# Patient Record
Sex: MALE | Race: WHITE | Employment: OTHER | URBAN - METROPOLITAN AREA
[De-identification: names, ages, dates, MRNs, and addresses within clinical notes are randomized per-mention and may not be internally consistent; named-entity substitution may affect disease eponyms.]

---

## 2018-05-02 DIAGNOSIS — E78.49 OTHER HYPERLIPIDEMIA: ICD-10-CM

## 2018-05-02 DIAGNOSIS — I10 ESSENTIAL HYPERTENSION, BENIGN: ICD-10-CM

## 2018-05-02 DIAGNOSIS — R53.83 FATIGUE, UNSPECIFIED TYPE: Primary | ICD-10-CM

## 2018-05-02 DIAGNOSIS — E88.9 NUTRITIONAL AND METABOLIC CARDIOMYOPATHY (HCC): ICD-10-CM

## 2018-05-02 DIAGNOSIS — M79.7 FIBROSITIS: ICD-10-CM

## 2018-05-02 DIAGNOSIS — E55.9 VITAMIN D DEFICIENCY: ICD-10-CM

## 2018-05-02 DIAGNOSIS — Z79.899 HIGH RISK MEDICATION USE: ICD-10-CM

## 2018-05-02 DIAGNOSIS — I43 NUTRITIONAL AND METABOLIC CARDIOMYOPATHY (HCC): ICD-10-CM

## 2018-05-02 DIAGNOSIS — E63.9 NUTRITIONAL AND METABOLIC CARDIOMYOPATHY (HCC): ICD-10-CM

## 2018-06-13 ENCOUNTER — HOSPITAL ENCOUNTER (OUTPATIENT)
Age: 53
Discharge: HOME | End: 2018-06-15
Payer: COMMERCIAL

## 2018-06-13 DIAGNOSIS — I43 NUTRITIONAL AND METABOLIC CARDIOMYOPATHY (HCC): ICD-10-CM

## 2018-06-13 DIAGNOSIS — E55.9 VITAMIN D DEFICIENCY: ICD-10-CM

## 2018-06-13 DIAGNOSIS — R53.83 FATIGUE, UNSPECIFIED TYPE: ICD-10-CM

## 2018-06-13 DIAGNOSIS — I10 ESSENTIAL HYPERTENSION, BENIGN: ICD-10-CM

## 2018-06-13 DIAGNOSIS — Z79.899 HIGH RISK MEDICATION USE: ICD-10-CM

## 2018-06-13 DIAGNOSIS — E63.9 NUTRITIONAL AND METABOLIC CARDIOMYOPATHY (HCC): ICD-10-CM

## 2018-06-13 DIAGNOSIS — E78.49 OTHER HYPERLIPIDEMIA: ICD-10-CM

## 2018-06-13 DIAGNOSIS — E88.9 NUTRITIONAL AND METABOLIC CARDIOMYOPATHY (HCC): ICD-10-CM

## 2018-06-13 DIAGNOSIS — M79.7 FIBROSITIS: ICD-10-CM

## 2018-06-13 LAB
ALBUMIN SERPL-MCNC: 4.1 G/DL (ref 3.5–5.2)
ALP BLD-CCNC: 60 U/L (ref 40–129)
ALT SERPL-CCNC: 21 U/L (ref 0–40)
ANION GAP SERPL CALCULATED.3IONS-SCNC: 13 MMOL/L (ref 7–16)
AST SERPL-CCNC: 20 U/L (ref 0–39)
BILIRUB SERPL-MCNC: 0.6 MG/DL (ref 0–1.2)
BILIRUBIN URINE: NEGATIVE
BLOOD, URINE: NEGATIVE
BUN BLDV-MCNC: 23 MG/DL (ref 6–20)
CALCIUM SERPL-MCNC: 9.1 MG/DL (ref 8.6–10.2)
CHLORIDE BLD-SCNC: 101 MMOL/L (ref 98–107)
CHOLESTEROL, TOTAL: 155 MG/DL (ref 0–199)
CLARITY: CLEAR
CO2: 28 MMOL/L (ref 22–29)
COLOR: YELLOW
CREAT SERPL-MCNC: 1.2 MG/DL (ref 0.7–1.2)
GFR AFRICAN AMERICAN: >60
GFR NON-AFRICAN AMERICAN: >60 ML/MIN/1.73
GLUCOSE BLD-MCNC: 113 MG/DL (ref 74–109)
GLUCOSE URINE: NEGATIVE MG/DL
HBA1C MFR BLD: 6.3 % (ref 4.8–5.9)
HCT VFR BLD CALC: 46.9 % (ref 37–54)
HDLC SERPL-MCNC: 30 MG/DL
HEMOGLOBIN: 14.9 G/DL (ref 12.5–16.5)
KETONES, URINE: NEGATIVE MG/DL
LDL CHOLESTEROL CALCULATED: 102 MG/DL (ref 0–99)
LEUKOCYTE ESTERASE, URINE: NEGATIVE
MCH RBC QN AUTO: 30.2 PG (ref 26–35)
MCHC RBC AUTO-ENTMCNC: 31.8 % (ref 32–34.5)
MCV RBC AUTO: 95.1 FL (ref 80–99.9)
NITRITE, URINE: NEGATIVE
PDW BLD-RTO: 13.8 FL (ref 11.5–15)
PH UA: 5.5 (ref 5–9)
PLATELET # BLD: 231 E9/L (ref 130–450)
PMV BLD AUTO: 9.4 FL (ref 7–12)
POTASSIUM SERPL-SCNC: 4 MMOL/L (ref 3.5–5)
PROTEIN UA: NEGATIVE MG/DL
RBC # BLD: 4.93 E12/L (ref 3.8–5.8)
SODIUM BLD-SCNC: 142 MMOL/L (ref 132–146)
SPECIFIC GRAVITY UA: >=1.03 (ref 1–1.03)
TOTAL PROTEIN: 7.1 G/DL (ref 6.4–8.3)
TRIGL SERPL-MCNC: 117 MG/DL (ref 0–149)
TSH SERPL DL<=0.05 MIU/L-ACNC: 1.84 UIU/ML (ref 0.27–4.2)
URIC ACID, SERUM: 8.6 MG/DL (ref 3.4–7)
UROBILINOGEN, URINE: 0.2 E.U./DL
VITAMIN D 25-HYDROXY: 27 NG/ML (ref 30–100)
VLDLC SERPL CALC-MCNC: 23 MG/DL
WBC # BLD: 5.7 E9/L (ref 4.5–11.5)

## 2018-06-13 PROCEDURE — 83036 HEMOGLOBIN GLYCOSYLATED A1C: CPT

## 2018-06-13 PROCEDURE — 80061 LIPID PANEL: CPT

## 2018-06-13 PROCEDURE — 82306 VITAMIN D 25 HYDROXY: CPT

## 2018-06-13 PROCEDURE — 81003 URINALYSIS AUTO W/O SCOPE: CPT

## 2018-06-13 PROCEDURE — 80053 COMPREHEN METABOLIC PANEL: CPT

## 2018-06-13 PROCEDURE — 84550 ASSAY OF BLOOD/URIC ACID: CPT

## 2018-06-13 PROCEDURE — 85027 COMPLETE CBC AUTOMATED: CPT

## 2018-06-13 PROCEDURE — 84443 ASSAY THYROID STIM HORMONE: CPT

## 2018-06-14 DIAGNOSIS — F41.9 ANXIETY: ICD-10-CM

## 2018-06-14 DIAGNOSIS — M79.7 FIBROMYALGIA: ICD-10-CM

## 2018-06-14 DIAGNOSIS — E88.81 METABOLIC SYNDROME: ICD-10-CM

## 2018-06-20 ENCOUNTER — OFFICE VISIT (OUTPATIENT)
Dept: PRIMARY CARE CLINIC | Age: 53
End: 2018-06-20
Payer: COMMERCIAL

## 2018-06-20 VITALS
DIASTOLIC BLOOD PRESSURE: 72 MMHG | TEMPERATURE: 98.3 F | OXYGEN SATURATION: 96 % | WEIGHT: 266 LBS | HEART RATE: 93 BPM | BODY MASS INDEX: 33.07 KG/M2 | RESPIRATION RATE: 18 BRPM | SYSTOLIC BLOOD PRESSURE: 120 MMHG | HEIGHT: 75 IN

## 2018-06-20 DIAGNOSIS — I10 ESSENTIAL HYPERTENSION: ICD-10-CM

## 2018-06-20 DIAGNOSIS — F33.2 SEVERE EPISODE OF RECURRENT MAJOR DEPRESSIVE DISORDER, WITHOUT PSYCHOTIC FEATURES (HCC): Primary | ICD-10-CM

## 2018-06-20 DIAGNOSIS — E78.3 HYPERCHYLOMICRONEMIA: ICD-10-CM

## 2018-06-20 DIAGNOSIS — N40.0 PROSTATE ENLARGEMENT: ICD-10-CM

## 2018-06-20 DIAGNOSIS — M79.7 FIBROMYALGIA: ICD-10-CM

## 2018-06-20 DIAGNOSIS — E66.09 CLASS 1 OBESITY DUE TO EXCESS CALORIES WITH BODY MASS INDEX (BMI) OF 31.0 TO 31.9 IN ADULT, UNSPECIFIED WHETHER SERIOUS COMORBIDITY PRESENT: ICD-10-CM

## 2018-06-20 DIAGNOSIS — R53.83 FATIGUE, UNSPECIFIED TYPE: ICD-10-CM

## 2018-06-20 DIAGNOSIS — M89.9 DISORDER OF BONE: ICD-10-CM

## 2018-06-20 DIAGNOSIS — R19.4 BOWEL HABIT CHANGES: ICD-10-CM

## 2018-06-20 DIAGNOSIS — R73.09 ELEVATED HEMOGLOBIN A1C: ICD-10-CM

## 2018-06-20 PROCEDURE — 99214 OFFICE O/P EST MOD 30 MIN: CPT | Performed by: INTERNAL MEDICINE

## 2018-06-20 RX ORDER — VALSARTAN AND HYDROCHLOROTHIAZIDE 160; 25 MG/1; MG/1
1 TABLET ORAL DAILY
Qty: 90 TABLET | Refills: 2 | Status: SHIPPED | OUTPATIENT
Start: 2018-06-20 | End: 2019-01-31 | Stop reason: SDUPTHER

## 2018-06-20 RX ORDER — VALSARTAN AND HYDROCHLOROTHIAZIDE 160; 25 MG/1; MG/1
1 TABLET ORAL DAILY
COMMUNITY
Start: 2018-04-27 | End: 2018-06-20 | Stop reason: SDUPTHER

## 2018-06-20 RX ORDER — DEXTROAMPHETAMINE SACCHARATE, AMPHETAMINE ASPARTATE, DEXTROAMPHETAMINE SULFATE AND AMPHETAMINE SULFATE 7.5; 7.5; 7.5; 7.5 MG/1; MG/1; MG/1; MG/1
30 TABLET ORAL
Status: ON HOLD | COMMUNITY
Start: 2018-04-20 | End: 2021-06-11

## 2018-06-20 RX ORDER — QUETIAPINE FUMARATE 200 MG/1
100 TABLET, FILM COATED ORAL NIGHTLY
COMMUNITY
Start: 2017-11-22

## 2018-06-20 ASSESSMENT — PATIENT HEALTH QUESTIONNAIRE - PHQ9
1. LITTLE INTEREST OR PLEASURE IN DOING THINGS: 0
SUM OF ALL RESPONSES TO PHQ9 QUESTIONS 1 & 2: 0
SUM OF ALL RESPONSES TO PHQ QUESTIONS 1-9: 0
2. FEELING DOWN, DEPRESSED OR HOPELESS: 0

## 2018-06-20 ASSESSMENT — ENCOUNTER SYMPTOMS
DOUBLE VISION: 0
NAUSEA: 0
BLOOD IN STOOL: 0
EYE REDNESS: 0
DIARRHEA: 0
EYE PAIN: 0
BLURRED VISION: 0
HEMOPTYSIS: 0
SHORTNESS OF BREATH: 0
STRIDOR: 0

## 2018-12-13 ENCOUNTER — TELEPHONE (OUTPATIENT)
Dept: PRIMARY CARE CLINIC | Age: 53
End: 2018-12-13

## 2018-12-13 DIAGNOSIS — E78.3 HYPERCHYLOMICRONEMIA: Primary | ICD-10-CM

## 2018-12-13 DIAGNOSIS — N40.0 PROSTATE ENLARGEMENT: ICD-10-CM

## 2018-12-13 DIAGNOSIS — E88.81 METABOLIC SYNDROME: ICD-10-CM

## 2018-12-13 DIAGNOSIS — I10 ESSENTIAL HYPERTENSION: ICD-10-CM

## 2018-12-13 DIAGNOSIS — R53.83 FATIGUE, UNSPECIFIED TYPE: ICD-10-CM

## 2018-12-13 DIAGNOSIS — R73.09 ELEVATED HEMOGLOBIN A1C: ICD-10-CM

## 2018-12-13 DIAGNOSIS — Z79.899 ENCOUNTER FOR LONG-TERM (CURRENT) USE OF MEDICATIONS: ICD-10-CM

## 2018-12-13 DIAGNOSIS — M89.9 DISORDER OF BONE: ICD-10-CM

## 2018-12-13 DIAGNOSIS — Z12.5 SCREENING FOR PROSTATE CANCER: ICD-10-CM

## 2018-12-20 ENCOUNTER — HOSPITAL ENCOUNTER (OUTPATIENT)
Age: 53
Discharge: HOME | End: 2018-12-22
Payer: COMMERCIAL

## 2018-12-20 DIAGNOSIS — E88.81 METABOLIC SYNDROME: ICD-10-CM

## 2018-12-20 DIAGNOSIS — Z12.5 SCREENING FOR PROSTATE CANCER: ICD-10-CM

## 2018-12-20 DIAGNOSIS — R53.83 FATIGUE, UNSPECIFIED TYPE: ICD-10-CM

## 2018-12-20 DIAGNOSIS — R73.09 ELEVATED HEMOGLOBIN A1C: ICD-10-CM

## 2018-12-20 DIAGNOSIS — Z79.899 ENCOUNTER FOR LONG-TERM (CURRENT) USE OF MEDICATIONS: ICD-10-CM

## 2018-12-20 DIAGNOSIS — I10 ESSENTIAL HYPERTENSION: ICD-10-CM

## 2018-12-20 DIAGNOSIS — E78.3 HYPERCHYLOMICRONEMIA: ICD-10-CM

## 2018-12-20 DIAGNOSIS — N40.0 PROSTATE ENLARGEMENT: ICD-10-CM

## 2018-12-20 DIAGNOSIS — M89.9 DISORDER OF BONE: ICD-10-CM

## 2018-12-20 LAB
ALBUMIN SERPL-MCNC: 4.2 G/DL (ref 3.5–5.2)
ALP BLD-CCNC: 67 U/L (ref 40–129)
ALT SERPL-CCNC: 20 U/L (ref 0–40)
ANION GAP SERPL CALCULATED.3IONS-SCNC: 12 MMOL/L (ref 7–16)
AST SERPL-CCNC: 18 U/L (ref 0–39)
BASOPHILS ABSOLUTE: 0.03 E9/L (ref 0–0.2)
BASOPHILS RELATIVE PERCENT: 0.4 % (ref 0–2)
BILIRUB SERPL-MCNC: 0.6 MG/DL (ref 0–1.2)
BILIRUBIN URINE: NEGATIVE
BLOOD, URINE: NEGATIVE
BUN BLDV-MCNC: 23 MG/DL (ref 6–20)
CALCIUM SERPL-MCNC: 9.4 MG/DL (ref 8.6–10.2)
CHLORIDE BLD-SCNC: 99 MMOL/L (ref 98–107)
CHOLESTEROL, TOTAL: 148 MG/DL (ref 0–199)
CLARITY: CLEAR
CO2: 29 MMOL/L (ref 22–29)
COLOR: YELLOW
CREAT SERPL-MCNC: 1.1 MG/DL (ref 0.7–1.2)
EOSINOPHILS ABSOLUTE: 0.15 E9/L (ref 0.05–0.5)
EOSINOPHILS RELATIVE PERCENT: 2.2 % (ref 0–6)
GFR AFRICAN AMERICAN: >60
GFR NON-AFRICAN AMERICAN: >60 ML/MIN/1.73
GLUCOSE BLD-MCNC: 122 MG/DL (ref 74–99)
GLUCOSE URINE: NEGATIVE MG/DL
HBA1C MFR BLD: 6.1 % (ref 4–5.6)
HCT VFR BLD CALC: 47.5 % (ref 37–54)
HDLC SERPL-MCNC: 33 MG/DL
HEMOGLOBIN: 15.2 G/DL (ref 12.5–16.5)
IMMATURE GRANULOCYTES #: 0.03 E9/L
IMMATURE GRANULOCYTES %: 0.4 % (ref 0–5)
KETONES, URINE: NEGATIVE MG/DL
LDL CHOLESTEROL CALCULATED: 93 MG/DL (ref 0–99)
LEUKOCYTE ESTERASE, URINE: NEGATIVE
LYMPHOCYTES ABSOLUTE: 1.87 E9/L (ref 1.5–4)
LYMPHOCYTES RELATIVE PERCENT: 28 % (ref 20–42)
MCH RBC QN AUTO: 29.9 PG (ref 26–35)
MCHC RBC AUTO-ENTMCNC: 32 % (ref 32–34.5)
MCV RBC AUTO: 93.5 FL (ref 80–99.9)
MONOCYTES ABSOLUTE: 0.8 E9/L (ref 0.1–0.95)
MONOCYTES RELATIVE PERCENT: 12 % (ref 2–12)
NEUTROPHILS ABSOLUTE: 3.8 E9/L (ref 1.8–7.3)
NEUTROPHILS RELATIVE PERCENT: 57 % (ref 43–80)
NITRITE, URINE: NEGATIVE
PDW BLD-RTO: 13.5 FL (ref 11.5–15)
PH UA: 5.5 (ref 5–9)
PLATELET # BLD: 256 E9/L (ref 130–450)
PMV BLD AUTO: 9.4 FL (ref 7–12)
POTASSIUM SERPL-SCNC: 4 MMOL/L (ref 3.5–5)
PROSTATE SPECIFIC ANTIGEN: 1.25 NG/ML (ref 0–4)
PROTEIN UA: NEGATIVE MG/DL
RBC # BLD: 5.08 E12/L (ref 3.8–5.8)
SODIUM BLD-SCNC: 140 MMOL/L (ref 132–146)
SPECIFIC GRAVITY UA: >=1.03 (ref 1–1.03)
TOTAL PROTEIN: 7.2 G/DL (ref 6.4–8.3)
TRIGL SERPL-MCNC: 110 MG/DL (ref 0–149)
TSH SERPL DL<=0.05 MIU/L-ACNC: 3.01 UIU/ML (ref 0.27–4.2)
URIC ACID, SERUM: 7.9 MG/DL (ref 3.4–7)
UROBILINOGEN, URINE: 0.2 E.U./DL
VLDLC SERPL CALC-MCNC: 22 MG/DL
WBC # BLD: 6.7 E9/L (ref 4.5–11.5)

## 2018-12-20 PROCEDURE — 85025 COMPLETE CBC W/AUTO DIFF WBC: CPT

## 2018-12-20 PROCEDURE — 80053 COMPREHEN METABOLIC PANEL: CPT

## 2018-12-20 PROCEDURE — 81003 URINALYSIS AUTO W/O SCOPE: CPT

## 2018-12-20 PROCEDURE — G0103 PSA SCREENING: HCPCS

## 2018-12-20 PROCEDURE — 80061 LIPID PANEL: CPT

## 2018-12-20 PROCEDURE — 84443 ASSAY THYROID STIM HORMONE: CPT

## 2018-12-20 PROCEDURE — 83036 HEMOGLOBIN GLYCOSYLATED A1C: CPT

## 2018-12-20 PROCEDURE — 84550 ASSAY OF BLOOD/URIC ACID: CPT

## 2018-12-20 PROCEDURE — 82652 VIT D 1 25-DIHYDROXY: CPT

## 2018-12-23 LAB — VITAMIN D 1,25-DIHYDROXY: 49.5 PG/ML (ref 19.9–79.3)

## 2019-01-31 ENCOUNTER — OFFICE VISIT (OUTPATIENT)
Dept: PRIMARY CARE CLINIC | Age: 54
End: 2019-01-31
Payer: COMMERCIAL

## 2019-01-31 VITALS
WEIGHT: 280 LBS | HEART RATE: 90 BPM | HEIGHT: 75 IN | SYSTOLIC BLOOD PRESSURE: 124 MMHG | RESPIRATION RATE: 12 BRPM | BODY MASS INDEX: 34.82 KG/M2 | OXYGEN SATURATION: 95 % | DIASTOLIC BLOOD PRESSURE: 88 MMHG | TEMPERATURE: 98.2 F

## 2019-01-31 DIAGNOSIS — E78.3 HYPERCHYLOMICRONEMIA: Primary | ICD-10-CM

## 2019-01-31 DIAGNOSIS — I10 ESSENTIAL HYPERTENSION: ICD-10-CM

## 2019-01-31 DIAGNOSIS — E66.09 CLASS 1 OBESITY DUE TO EXCESS CALORIES WITH BODY MASS INDEX (BMI) OF 31.0 TO 31.9 IN ADULT, UNSPECIFIED WHETHER SERIOUS COMORBIDITY PRESENT: ICD-10-CM

## 2019-01-31 DIAGNOSIS — R73.09 ELEVATED HEMOGLOBIN A1C: ICD-10-CM

## 2019-01-31 DIAGNOSIS — F33.2 SEVERE EPISODE OF RECURRENT MAJOR DEPRESSIVE DISORDER, WITHOUT PSYCHOTIC FEATURES (HCC): ICD-10-CM

## 2019-01-31 DIAGNOSIS — F41.9 ANXIETY: ICD-10-CM

## 2019-01-31 PROCEDURE — 99214 OFFICE O/P EST MOD 30 MIN: CPT | Performed by: NURSE PRACTITIONER

## 2019-01-31 RX ORDER — VALSARTAN AND HYDROCHLOROTHIAZIDE 160; 25 MG/1; MG/1
1 TABLET ORAL DAILY
Qty: 90 TABLET | Refills: 2 | Status: SHIPPED | OUTPATIENT
Start: 2019-01-31 | End: 2020-08-27

## 2019-01-31 ASSESSMENT — ENCOUNTER SYMPTOMS
ALLERGIC/IMMUNOLOGIC NEGATIVE: 1
EYES NEGATIVE: 1
RESPIRATORY NEGATIVE: 1
GASTROINTESTINAL NEGATIVE: 1

## 2019-07-18 ENCOUNTER — HOSPITAL ENCOUNTER (OUTPATIENT)
Age: 54
Discharge: HOME | End: 2019-07-20
Payer: COMMERCIAL

## 2019-07-18 DIAGNOSIS — E78.3 HYPERCHYLOMICRONEMIA: ICD-10-CM

## 2019-07-18 DIAGNOSIS — R73.09 ELEVATED HEMOGLOBIN A1C: ICD-10-CM

## 2019-07-18 LAB
ALBUMIN SERPL-MCNC: 4.1 G/DL (ref 3.5–5.2)
ALP BLD-CCNC: 72 U/L (ref 40–129)
ALT SERPL-CCNC: 21 U/L (ref 0–40)
ANION GAP SERPL CALCULATED.3IONS-SCNC: 20 MMOL/L (ref 7–16)
AST SERPL-CCNC: 19 U/L (ref 0–39)
BASOPHILS ABSOLUTE: 0.02 E9/L (ref 0–0.2)
BASOPHILS RELATIVE PERCENT: 0.3 % (ref 0–2)
BILIRUB SERPL-MCNC: 0.4 MG/DL (ref 0–1.2)
BILIRUBIN URINE: NEGATIVE
BLOOD, URINE: NEGATIVE
BUN BLDV-MCNC: 17 MG/DL (ref 6–20)
CALCIUM SERPL-MCNC: 9.9 MG/DL (ref 8.6–10.2)
CHLORIDE BLD-SCNC: 102 MMOL/L (ref 98–107)
CHOLESTEROL, TOTAL: 164 MG/DL (ref 0–199)
CLARITY: CLEAR
CO2: 22 MMOL/L (ref 22–29)
COLOR: YELLOW
CREAT SERPL-MCNC: 1 MG/DL (ref 0.7–1.2)
EOSINOPHILS ABSOLUTE: 0.2 E9/L (ref 0.05–0.5)
EOSINOPHILS RELATIVE PERCENT: 3 % (ref 0–6)
GFR AFRICAN AMERICAN: >60
GFR NON-AFRICAN AMERICAN: >60 ML/MIN/1.73
GLUCOSE BLD-MCNC: 123 MG/DL (ref 74–99)
GLUCOSE URINE: NEGATIVE MG/DL
HBA1C MFR BLD: 6.3 % (ref 4–5.6)
HCT VFR BLD CALC: 48.2 % (ref 37–54)
HDLC SERPL-MCNC: 31 MG/DL
HEMOGLOBIN: 15.8 G/DL (ref 12.5–16.5)
IMMATURE GRANULOCYTES #: 0.03 E9/L
IMMATURE GRANULOCYTES %: 0.4 % (ref 0–5)
KETONES, URINE: NEGATIVE MG/DL
LDL CHOLESTEROL CALCULATED: 99 MG/DL (ref 0–99)
LEUKOCYTE ESTERASE, URINE: NEGATIVE
LYMPHOCYTES ABSOLUTE: 1.72 E9/L (ref 1.5–4)
LYMPHOCYTES RELATIVE PERCENT: 25.5 % (ref 20–42)
MCH RBC QN AUTO: 30.2 PG (ref 26–35)
MCHC RBC AUTO-ENTMCNC: 32.8 % (ref 32–34.5)
MCV RBC AUTO: 92.2 FL (ref 80–99.9)
MONOCYTES ABSOLUTE: 0.89 E9/L (ref 0.1–0.95)
MONOCYTES RELATIVE PERCENT: 13.2 % (ref 2–12)
NEUTROPHILS ABSOLUTE: 3.89 E9/L (ref 1.8–7.3)
NEUTROPHILS RELATIVE PERCENT: 57.6 % (ref 43–80)
NITRITE, URINE: NEGATIVE
PDW BLD-RTO: 13.3 FL (ref 11.5–15)
PH UA: 5 (ref 5–9)
PLATELET # BLD: 253 E9/L (ref 130–450)
PMV BLD AUTO: 9.4 FL (ref 7–12)
POTASSIUM SERPL-SCNC: 3.9 MMOL/L (ref 3.5–5)
PROTEIN UA: NEGATIVE MG/DL
RBC # BLD: 5.23 E12/L (ref 3.8–5.8)
SODIUM BLD-SCNC: 144 MMOL/L (ref 132–146)
SPECIFIC GRAVITY UA: 1.02 (ref 1–1.03)
TOTAL PROTEIN: 7.2 G/DL (ref 6.4–8.3)
TRIGL SERPL-MCNC: 172 MG/DL (ref 0–149)
UROBILINOGEN, URINE: 0.2 E.U./DL
VLDLC SERPL CALC-MCNC: 34 MG/DL
WBC # BLD: 6.8 E9/L (ref 4.5–11.5)

## 2019-07-18 PROCEDURE — 83036 HEMOGLOBIN GLYCOSYLATED A1C: CPT

## 2019-07-18 PROCEDURE — 80053 COMPREHEN METABOLIC PANEL: CPT

## 2019-07-18 PROCEDURE — 81003 URINALYSIS AUTO W/O SCOPE: CPT

## 2019-07-18 PROCEDURE — 80061 LIPID PANEL: CPT

## 2019-07-18 PROCEDURE — 85025 COMPLETE CBC W/AUTO DIFF WBC: CPT

## 2019-07-25 ENCOUNTER — OFFICE VISIT (OUTPATIENT)
Dept: PRIMARY CARE CLINIC | Age: 54
End: 2019-07-25
Payer: COMMERCIAL

## 2019-07-25 VITALS
HEART RATE: 74 BPM | HEIGHT: 76 IN | RESPIRATION RATE: 16 BRPM | OXYGEN SATURATION: 97 % | BODY MASS INDEX: 33.85 KG/M2 | WEIGHT: 278 LBS | DIASTOLIC BLOOD PRESSURE: 86 MMHG | TEMPERATURE: 98.7 F | SYSTOLIC BLOOD PRESSURE: 136 MMHG

## 2019-07-25 DIAGNOSIS — F41.9 ANXIETY: ICD-10-CM

## 2019-07-25 DIAGNOSIS — E66.09 CLASS 1 OBESITY DUE TO EXCESS CALORIES WITH BODY MASS INDEX (BMI) OF 31.0 TO 31.9 IN ADULT, UNSPECIFIED WHETHER SERIOUS COMORBIDITY PRESENT: ICD-10-CM

## 2019-07-25 DIAGNOSIS — R73.03 PREDIABETES: ICD-10-CM

## 2019-07-25 DIAGNOSIS — M79.7 FIBROMYALGIA: ICD-10-CM

## 2019-07-25 DIAGNOSIS — Z12.5 SCREENING FOR MALIGNANT NEOPLASM OF PROSTATE: ICD-10-CM

## 2019-07-25 DIAGNOSIS — E78.2 MIXED HYPERLIPIDEMIA: ICD-10-CM

## 2019-07-25 DIAGNOSIS — I10 ESSENTIAL HYPERTENSION: Primary | ICD-10-CM

## 2019-07-25 DIAGNOSIS — R53.83 OTHER FATIGUE: ICD-10-CM

## 2019-07-25 DIAGNOSIS — G43.009 MIGRAINE WITHOUT AURA AND WITHOUT STATUS MIGRAINOSUS, NOT INTRACTABLE: ICD-10-CM

## 2019-07-25 DIAGNOSIS — E55.9 VITAMIN D DEFICIENCY: ICD-10-CM

## 2019-07-25 DIAGNOSIS — K21.9 GASTROESOPHAGEAL REFLUX DISEASE WITHOUT ESOPHAGITIS: ICD-10-CM

## 2019-07-25 DIAGNOSIS — F33.2 SEVERE EPISODE OF RECURRENT MAJOR DEPRESSIVE DISORDER, WITHOUT PSYCHOTIC FEATURES (HCC): ICD-10-CM

## 2019-07-25 PROBLEM — R73.09 ELEVATED HEMOGLOBIN A1C: Status: RESOLVED | Noted: 2018-06-20 | Resolved: 2019-07-25

## 2019-07-25 PROCEDURE — 99214 OFFICE O/P EST MOD 30 MIN: CPT | Performed by: INTERNAL MEDICINE

## 2019-07-25 RX ORDER — RIZATRIPTAN BENZOATE 10 MG/1
10 TABLET ORAL
Qty: 24 TABLET | Refills: 0 | Status: SHIPPED
Start: 2019-07-25 | End: 2020-08-27

## 2019-07-25 RX ORDER — RIZATRIPTAN BENZOATE 10 MG/1
10 TABLET ORAL
COMMUNITY
End: 2019-07-25 | Stop reason: SDUPTHER

## 2019-07-25 ASSESSMENT — ENCOUNTER SYMPTOMS
EYE PAIN: 0
NAUSEA: 0
CONSTIPATION: 0
BLOOD IN STOOL: 0
CHEST TIGHTNESS: 0
ABDOMINAL PAIN: 0
RHINORRHEA: 0
SORE THROAT: 0
DIARRHEA: 0
SHORTNESS OF BREATH: 0
COUGH: 0
VOMITING: 0

## 2019-12-17 ENCOUNTER — HOSPITAL ENCOUNTER (OUTPATIENT)
Age: 54
Discharge: HOME | End: 2019-12-19
Payer: COMMERCIAL

## 2019-12-17 DIAGNOSIS — E55.9 VITAMIN D DEFICIENCY: ICD-10-CM

## 2019-12-17 DIAGNOSIS — R53.83 OTHER FATIGUE: ICD-10-CM

## 2019-12-17 DIAGNOSIS — R73.03 PREDIABETES: ICD-10-CM

## 2019-12-17 DIAGNOSIS — E78.2 MIXED HYPERLIPIDEMIA: ICD-10-CM

## 2019-12-17 DIAGNOSIS — Z12.5 SCREENING FOR MALIGNANT NEOPLASM OF PROSTATE: ICD-10-CM

## 2019-12-17 LAB
ALBUMIN SERPL-MCNC: 4.2 G/DL (ref 3.5–5.2)
ALP BLD-CCNC: 90 U/L (ref 40–129)
ALT SERPL-CCNC: 22 U/L (ref 0–40)
ANION GAP SERPL CALCULATED.3IONS-SCNC: 13 MMOL/L (ref 7–16)
AST SERPL-CCNC: 21 U/L (ref 0–39)
BACTERIA: ABNORMAL /HPF
BASOPHILS ABSOLUTE: 0.02 E9/L (ref 0–0.2)
BASOPHILS RELATIVE PERCENT: 0.3 % (ref 0–2)
BILIRUB SERPL-MCNC: 0.5 MG/DL (ref 0–1.2)
BILIRUBIN URINE: NEGATIVE
BLOOD, URINE: ABNORMAL
BUN BLDV-MCNC: 15 MG/DL (ref 6–20)
CALCIUM SERPL-MCNC: 9.5 MG/DL (ref 8.6–10.2)
CHLORIDE BLD-SCNC: 101 MMOL/L (ref 98–107)
CHOLESTEROL, FASTING: 150 MG/DL (ref 0–199)
CLARITY: CLEAR
CO2: 27 MMOL/L (ref 22–29)
COLOR: YELLOW
CREAT SERPL-MCNC: 1 MG/DL (ref 0.7–1.2)
EOSINOPHILS ABSOLUTE: 0.19 E9/L (ref 0.05–0.5)
EOSINOPHILS RELATIVE PERCENT: 2.6 % (ref 0–6)
GFR AFRICAN AMERICAN: >60
GFR NON-AFRICAN AMERICAN: >60 ML/MIN/1.73
GLUCOSE BLD-MCNC: 138 MG/DL (ref 74–99)
GLUCOSE URINE: NEGATIVE MG/DL
HBA1C MFR BLD: 6.8 % (ref 4–5.6)
HCT VFR BLD CALC: 49.7 % (ref 37–54)
HDLC SERPL-MCNC: 36 MG/DL
HEMOGLOBIN: 15.9 G/DL (ref 12.5–16.5)
IMMATURE GRANULOCYTES #: 0.02 E9/L
IMMATURE GRANULOCYTES %: 0.3 % (ref 0–5)
KETONES, URINE: NEGATIVE MG/DL
LDL CHOLESTEROL CALCULATED: 97 MG/DL (ref 0–99)
LEUKOCYTE ESTERASE, URINE: NEGATIVE
LYMPHOCYTES ABSOLUTE: 1.62 E9/L (ref 1.5–4)
LYMPHOCYTES RELATIVE PERCENT: 22.5 % (ref 20–42)
MAGNESIUM: 2.2 MG/DL (ref 1.6–2.6)
MCH RBC QN AUTO: 29.5 PG (ref 26–35)
MCHC RBC AUTO-ENTMCNC: 32 % (ref 32–34.5)
MCV RBC AUTO: 92.2 FL (ref 80–99.9)
MICROALBUMIN UR-MCNC: 264.9 MG/L
MONOCYTES ABSOLUTE: 0.86 E9/L (ref 0.1–0.95)
MONOCYTES RELATIVE PERCENT: 11.9 % (ref 2–12)
NEUTROPHILS ABSOLUTE: 4.5 E9/L (ref 1.8–7.3)
NEUTROPHILS RELATIVE PERCENT: 62.4 % (ref 43–80)
NITRITE, URINE: NEGATIVE
PDW BLD-RTO: 13.7 FL (ref 11.5–15)
PH UA: 6 (ref 5–9)
PLATELET # BLD: 253 E9/L (ref 130–450)
PMV BLD AUTO: 9.3 FL (ref 7–12)
POTASSIUM SERPL-SCNC: 3.3 MMOL/L (ref 3.5–5)
PROSTATE SPECIFIC ANTIGEN: 1.46 NG/ML (ref 0–4)
PROTEIN UA: 30 MG/DL
RBC # BLD: 5.39 E12/L (ref 3.8–5.8)
RBC UA: ABNORMAL /HPF (ref 0–2)
SODIUM BLD-SCNC: 141 MMOL/L (ref 132–146)
SPECIFIC GRAVITY UA: >=1.03 (ref 1–1.03)
TOTAL PROTEIN: 7.4 G/DL (ref 6.4–8.3)
TRIGLYCERIDE, FASTING: 86 MG/DL (ref 0–149)
TSH SERPL DL<=0.05 MIU/L-ACNC: 2.02 UIU/ML (ref 0.27–4.2)
UROBILINOGEN, URINE: 0.2 E.U./DL
VITAMIN D 25-HYDROXY: 25 NG/ML (ref 30–100)
VLDLC SERPL CALC-MCNC: 17 MG/DL
WBC # BLD: 7.2 E9/L (ref 4.5–11.5)
WBC UA: ABNORMAL /HPF (ref 0–5)

## 2019-12-17 PROCEDURE — 81001 URINALYSIS AUTO W/SCOPE: CPT

## 2019-12-17 PROCEDURE — 84443 ASSAY THYROID STIM HORMONE: CPT

## 2019-12-17 PROCEDURE — G0103 PSA SCREENING: HCPCS

## 2019-12-17 PROCEDURE — 80061 LIPID PANEL: CPT

## 2019-12-17 PROCEDURE — 82044 UR ALBUMIN SEMIQUANTITATIVE: CPT

## 2019-12-17 PROCEDURE — 83735 ASSAY OF MAGNESIUM: CPT

## 2019-12-17 PROCEDURE — 80053 COMPREHEN METABOLIC PANEL: CPT

## 2019-12-17 PROCEDURE — 85025 COMPLETE CBC W/AUTO DIFF WBC: CPT

## 2019-12-17 PROCEDURE — 83036 HEMOGLOBIN GLYCOSYLATED A1C: CPT

## 2019-12-17 PROCEDURE — 82306 VITAMIN D 25 HYDROXY: CPT

## 2019-12-18 ENCOUNTER — TELEPHONE (OUTPATIENT)
Dept: FAMILY MEDICINE CLINIC | Age: 54
End: 2019-12-18

## 2020-08-25 ENCOUNTER — TELEPHONE (OUTPATIENT)
Dept: FAMILY MEDICINE CLINIC | Age: 55
End: 2020-08-25

## 2020-08-27 RX ORDER — LISINOPRIL 10 MG/1
40 TABLET ORAL DAILY
COMMUNITY

## 2020-08-27 RX ORDER — BUSPIRONE HYDROCHLORIDE 10 MG/1
10 TABLET ORAL NIGHTLY
Status: ON HOLD | COMMUNITY
End: 2021-06-11 | Stop reason: ALTCHOICE

## 2020-08-28 ENCOUNTER — ANESTHESIA (OUTPATIENT)
Dept: OPERATING ROOM | Age: 55
End: 2020-08-28
Payer: COMMERCIAL

## 2020-08-28 ENCOUNTER — HOSPITAL ENCOUNTER (OUTPATIENT)
Age: 55
Setting detail: OUTPATIENT SURGERY
Discharge: HOME | End: 2020-08-28
Attending: OPHTHALMOLOGY | Admitting: OPHTHALMOLOGY
Payer: COMMERCIAL

## 2020-08-28 ENCOUNTER — ANESTHESIA EVENT (OUTPATIENT)
Dept: OPERATING ROOM | Age: 55
End: 2020-08-28
Payer: COMMERCIAL

## 2020-08-28 VITALS
HEART RATE: 74 BPM | TEMPERATURE: 97.4 F | SYSTOLIC BLOOD PRESSURE: 160 MMHG | HEIGHT: 75 IN | RESPIRATION RATE: 14 BRPM | BODY MASS INDEX: 32.95 KG/M2 | OXYGEN SATURATION: 96 % | WEIGHT: 265 LBS | DIASTOLIC BLOOD PRESSURE: 85 MMHG

## 2020-08-28 VITALS — OXYGEN SATURATION: 95 % | DIASTOLIC BLOOD PRESSURE: 125 MMHG | SYSTOLIC BLOOD PRESSURE: 208 MMHG

## 2020-08-28 PROCEDURE — 3600000013 HC SURGERY LEVEL 3 ADDTL 15MIN: Performed by: OPHTHALMOLOGY

## 2020-08-28 PROCEDURE — 3700000000 HC ANESTHESIA ATTENDED CARE: Performed by: OPHTHALMOLOGY

## 2020-08-28 PROCEDURE — 2720000010 HC SURG SUPPLY STERILE: Performed by: OPHTHALMOLOGY

## 2020-08-28 PROCEDURE — 3700000001 HC ADD 15 MINUTES (ANESTHESIA): Performed by: OPHTHALMOLOGY

## 2020-08-28 PROCEDURE — 7100000011 HC PHASE II RECOVERY - ADDTL 15 MIN: Performed by: OPHTHALMOLOGY

## 2020-08-28 PROCEDURE — 6370000000 HC RX 637 (ALT 250 FOR IP): Performed by: OPHTHALMOLOGY

## 2020-08-28 PROCEDURE — 6360000002 HC RX W HCPCS: Performed by: OPHTHALMOLOGY

## 2020-08-28 PROCEDURE — 6360000002 HC RX W HCPCS: Performed by: NURSE ANESTHETIST, CERTIFIED REGISTERED

## 2020-08-28 PROCEDURE — 2580000003 HC RX 258: Performed by: OPHTHALMOLOGY

## 2020-08-28 PROCEDURE — 2500000003 HC RX 250 WO HCPCS: Performed by: OPHTHALMOLOGY

## 2020-08-28 PROCEDURE — 3600000003 HC SURGERY LEVEL 3 BASE: Performed by: OPHTHALMOLOGY

## 2020-08-28 PROCEDURE — 7100000010 HC PHASE II RECOVERY - FIRST 15 MIN: Performed by: OPHTHALMOLOGY

## 2020-08-28 PROCEDURE — 2709999900 HC NON-CHARGEABLE SUPPLY: Performed by: OPHTHALMOLOGY

## 2020-08-28 PROCEDURE — 2580000003 HC RX 258: Performed by: NURSE ANESTHETIST, CERTIFIED REGISTERED

## 2020-08-28 RX ORDER — CYCLOPENTOLATE HYDROCHLORIDE 10 MG/ML
1 SOLUTION/ DROPS OPHTHALMIC EVERY 10 MIN PRN
Status: COMPLETED | OUTPATIENT
Start: 2020-08-28 | End: 2020-08-28

## 2020-08-28 RX ORDER — PHENYLEPHRINE HYDROCHLORIDE 100 MG/ML
1 SOLUTION/ DROPS OPHTHALMIC PRN
Status: DISCONTINUED | OUTPATIENT
Start: 2020-08-28 | End: 2020-08-28 | Stop reason: HOSPADM

## 2020-08-28 RX ORDER — MIDAZOLAM HYDROCHLORIDE 1 MG/ML
INJECTION INTRAMUSCULAR; INTRAVENOUS PRN
Status: DISCONTINUED | OUTPATIENT
Start: 2020-08-28 | End: 2020-08-28 | Stop reason: SDUPTHER

## 2020-08-28 RX ORDER — PHENYLEPHRINE HCL 2.5 %
1 DROPS OPHTHALMIC (EYE) EVERY 10 MIN PRN
Status: COMPLETED | OUTPATIENT
Start: 2020-08-28 | End: 2020-08-28

## 2020-08-28 RX ORDER — PROPARACAINE HYDROCHLORIDE 5 MG/ML
SOLUTION/ DROPS OPHTHALMIC PRN
Status: DISCONTINUED | OUTPATIENT
Start: 2020-08-28 | End: 2020-08-28 | Stop reason: ALTCHOICE

## 2020-08-28 RX ORDER — SODIUM CHLORIDE 0.9 % (FLUSH) 0.9 %
10 SYRINGE (ML) INJECTION EVERY 12 HOURS SCHEDULED
Status: DISCONTINUED | OUTPATIENT
Start: 2020-08-28 | End: 2020-08-28 | Stop reason: HOSPADM

## 2020-08-28 RX ORDER — SODIUM CHLORIDE 0.9 % (FLUSH) 0.9 %
10 SYRINGE (ML) INJECTION PRN
Status: DISCONTINUED | OUTPATIENT
Start: 2020-08-28 | End: 2020-08-28 | Stop reason: HOSPADM

## 2020-08-28 RX ORDER — FENTANYL CITRATE 50 UG/ML
INJECTION, SOLUTION INTRAMUSCULAR; INTRAVENOUS PRN
Status: DISCONTINUED | OUTPATIENT
Start: 2020-08-28 | End: 2020-08-28 | Stop reason: SDUPTHER

## 2020-08-28 RX ORDER — SODIUM CHLORIDE 9 MG/ML
INJECTION, SOLUTION INTRAVENOUS CONTINUOUS PRN
Status: DISCONTINUED | OUTPATIENT
Start: 2020-08-28 | End: 2020-08-28 | Stop reason: SDUPTHER

## 2020-08-28 RX ORDER — DEXAMETHASONE SODIUM PHOSPHATE 10 MG/ML
INJECTION INTRAMUSCULAR; INTRAVENOUS PRN
Status: DISCONTINUED | OUTPATIENT
Start: 2020-08-28 | End: 2020-08-28 | Stop reason: ALTCHOICE

## 2020-08-28 RX ORDER — CEFAZOLIN SODIUM 1 G/3ML
INJECTION, POWDER, FOR SOLUTION INTRAMUSCULAR; INTRAVENOUS PRN
Status: DISCONTINUED | OUTPATIENT
Start: 2020-08-28 | End: 2020-08-28 | Stop reason: ALTCHOICE

## 2020-08-28 RX ORDER — ATROPINE SULFATE 10 MG/ML
SOLUTION/ DROPS OPHTHALMIC PRN
Status: DISCONTINUED | OUTPATIENT
Start: 2020-08-28 | End: 2020-08-28 | Stop reason: ALTCHOICE

## 2020-08-28 RX ORDER — TROPICAMIDE 10 MG/ML
1 SOLUTION/ DROPS OPHTHALMIC EVERY 10 MIN PRN
Status: COMPLETED | OUTPATIENT
Start: 2020-08-28 | End: 2020-08-28

## 2020-08-28 RX ORDER — HYDRALAZINE HYDROCHLORIDE 20 MG/ML
INJECTION INTRAMUSCULAR; INTRAVENOUS PRN
Status: DISCONTINUED | OUTPATIENT
Start: 2020-08-28 | End: 2020-08-28 | Stop reason: SDUPTHER

## 2020-08-28 RX ORDER — PROPARACAINE HYDROCHLORIDE 5 MG/ML
1 SOLUTION/ DROPS OPHTHALMIC PRN
Status: COMPLETED | OUTPATIENT
Start: 2020-08-28 | End: 2020-08-28

## 2020-08-28 RX ADMIN — MIDAZOLAM 1 MG: 1 INJECTION INTRAMUSCULAR; INTRAVENOUS at 13:07

## 2020-08-28 RX ADMIN — HYDRALAZINE HYDROCHLORIDE 2.5 MG: 20 INJECTION INTRAMUSCULAR; INTRAVENOUS at 13:49

## 2020-08-28 RX ADMIN — PHENYLEPHRINE HYDROCHLORIDE 1 DROP: 25 SOLUTION/ DROPS OPHTHALMIC at 11:36

## 2020-08-28 RX ADMIN — HYDRALAZINE HYDROCHLORIDE 2.5 MG: 20 INJECTION INTRAMUSCULAR; INTRAVENOUS at 13:34

## 2020-08-28 RX ADMIN — PHENYLEPHRINE HYDROCHLORIDE 1 DROP: 25 SOLUTION/ DROPS OPHTHALMIC at 11:57

## 2020-08-28 RX ADMIN — FENTANYL CITRATE 50 MCG: 50 INJECTION, SOLUTION INTRAMUSCULAR; INTRAVENOUS at 13:07

## 2020-08-28 RX ADMIN — Medication 1 DROP: at 11:46

## 2020-08-28 RX ADMIN — MIDAZOLAM 1 MG: 1 INJECTION INTRAMUSCULAR; INTRAVENOUS at 13:01

## 2020-08-28 RX ADMIN — Medication 1 DROP: at 11:57

## 2020-08-28 RX ADMIN — SODIUM CHLORIDE: 9 INJECTION, SOLUTION INTRAVENOUS at 13:01

## 2020-08-28 RX ADMIN — Medication 1 DROP: at 11:36

## 2020-08-28 RX ADMIN — PHENYLEPHRINE HYDROCHLORIDE 1 DROP: 25 SOLUTION/ DROPS OPHTHALMIC at 11:46

## 2020-08-28 RX ADMIN — HYDRALAZINE HYDROCHLORIDE 2.5 MG: 20 INJECTION INTRAMUSCULAR; INTRAVENOUS at 14:07

## 2020-08-28 RX ADMIN — Medication 1 DROP: at 11:45

## 2020-08-28 RX ADMIN — FENTANYL CITRATE 50 MCG: 50 INJECTION, SOLUTION INTRAMUSCULAR; INTRAVENOUS at 13:13

## 2020-08-28 RX ADMIN — HYDRALAZINE HYDROCHLORIDE 2.5 MG: 20 INJECTION INTRAMUSCULAR; INTRAVENOUS at 13:27

## 2020-08-28 RX ADMIN — FENTANYL CITRATE 50 MCG: 50 INJECTION, SOLUTION INTRAMUSCULAR; INTRAVENOUS at 13:59

## 2020-08-28 ASSESSMENT — PULMONARY FUNCTION TESTS
PIF_VALUE: 1
PIF_VALUE: 0
PIF_VALUE: 1

## 2020-08-28 NOTE — ANESTHESIA PRE PROCEDURE
tobramycin-dexamethasone (TOBRADEX) ophthalmic ointment    PRN Ana Temple MD   0.25 g at 08/28/20 1328    balanced salts plus (BSS) 500 mL, dextrose 3 mL with EPINEPHrine (EPINEPHrine HCL) 0.3 mg    PRN Ana Temple MD   500 mL at 08/28/20 1331     Facility-Administered Medications Ordered in Other Encounters   Medication Dose Route Frequency Provider Last Rate Last Dose    0.9 % sodium chloride infusion    Continuous PRN Almer Christopher, APRN - CRNA        midazolam (VERSED) injection    PRN Almer Christopher, APRN - CRNA   1 mg at 08/28/20 1307    fentaNYL (SUBLIMAZE) injection    PRN Almer Christopher, APRN - CRNA   50 mcg at 08/28/20 1313    hydrALAZINE (APRESOLINE) injection    PRN Almer Christopher, APRN - CRNA   2.5 mg at 08/28/20 1327       Allergies:  No Known Allergies    Problem List:    Patient Active Problem List   Diagnosis Code    Depression F32.9    Anxiety F41.9    Obesity X24.7    Metabolic syndrome P59.10    Fibromyalgia M79.7    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    Essential hypertension I10    Prostate enlargement N40.0    Fatigue R53.83    Disorder of bone M89.9    Bowel habit changes R19.4    Prediabetes R73.03    Migraine without aura and without status migrainosus, not intractable G43.009    Gastroesophageal reflux disease without esophagitis K21.9       Past Medical History:        Diagnosis Date    Anxiety     Depression     Diabetes mellitus ([address]     Fibromyalgia     Left retinal detachment     Metabolic syndrome     Obesity        Past Surgical History:        Procedure Laterality Date    CHOLECYSTECTOMY      UMBILICAL HERNIA REPAIR  2015    VASECTOMY  2004       Social History:    Social History     Tobacco Use    Smoking status: Never Smoker    Smokeless tobacco: Never Used   Substance Use Topics    Alcohol use:  No                                Counseling given: Not Answered      Vital Signs (Current):   Vitals:    08/27/20 1344 08/28/20 1129   BP: (!) 152/89   Pulse:  88   Resp:  18   Temp:  97.1 °F (36.2 °C)   TempSrc:  Temporal   SpO2:  95%   Weight: 265 lb (120.2 kg) 265 lb (120.2 kg)   Height: 6' 3\" (1.905 m) 6' 3\" (1.905 m)                                              BP Readings from Last 3 Encounters:   08/28/20 (!) 152/89   08/28/20 (!) 161/103   07/25/19 136/86       NPO Status: Time of last liquid consumption: 2300                        Time of last solid consumption: 2300                        Date of last liquid consumption: 08/27/20                        Date of last solid food consumption: 08/27/20    BMI:   Wt Readings from Last 3 Encounters:   08/28/20 265 lb (120.2 kg)   07/25/19 278 lb (126.1 kg)   01/31/19 280 lb (127 kg)     Body mass index is 33.12 kg/m². CBC:   Lab Results   Component Value Date    WBC 7.2 12/17/2019    RBC 5.39 12/17/2019    HGB 15.9 12/17/2019    HCT 49.7 12/17/2019    MCV 92.2 12/17/2019    RDW 13.7 12/17/2019     12/17/2019       CMP:   Lab Results   Component Value Date     12/17/2019    K 3.3 12/17/2019     12/17/2019    CO2 27 12/17/2019    BUN 15 12/17/2019    CREATININE 1.0 12/17/2019    GFRAA >60 12/17/2019    LABGLOM >60 12/17/2019    GLUCOSE 138 12/17/2019    PROT 7.4 12/17/2019    CALCIUM 9.5 12/17/2019    BILITOT 0.5 12/17/2019    ALKPHOS 90 12/17/2019    AST 21 12/17/2019    ALT 22 12/17/2019       POC Tests: No results for input(s): POCGLU, POCNA, POCK, POCCL, POCBUN, POCHEMO, POCHCT in the last 72 hours.     Coags: No results found for: PROTIME, INR, APTT    HCG (If Applicable): No results found for: PREGTESTUR, PREGSERUM, HCG, HCGQUANT     ABGs: No results found for: PHART, PO2ART, AKH8UJW, HIL6WUG, BEART, O6CWYTNK     Type & Screen (If Applicable):  No results found for: LABABO, LABRH    Drug/Infectious Status (If Applicable):  No results found for: HIV, HEPCAB    COVID-19 Screening (If Applicable): No results found for: COVID19      Anesthesia Evaluation  Patient summary reviewed no history of anesthetic complications:   Airway: Mallampati: III  TM distance: >3 FB   Neck ROM: full  Mouth opening: > = 3 FB Dental: normal exam         Pulmonary:Negative Pulmonary ROS breath sounds clear to auscultation                             Cardiovascular:    (+) hypertension:,         Rhythm: regular             Beta Blocker:  Not on Beta Blocker         Neuro/Psych:   (+) neuromuscular disease:, headaches: migraine headaches, psychiatric history: stable with treatmentdepression/anxiety              ROS comment: Fibromyalgia  GI/Hepatic/Renal:        (-) GERD       Endo/Other:    (+) Diabetes (pre-diabetic ), .                  ROS comment: Detached retina Abdominal:           Vascular: negative vascular ROS. Anesthesia Plan      MAC     ASA 3       Induction: intravenous. Anesthetic plan and risks discussed with patient. Plan discussed with CRNA.             [address]ait[address] Trevino,    8/28/2020

## 2020-08-28 NOTE — ANESTHESIA POSTPROCEDURE EVALUATION
Department of Anesthesiology  Postprocedure Note    Patient: Lou Valdivia  MRN: [id number]  YOB: 1965  Date of evaluation: 8/28/2020  Time:  3:02 PM     Procedure Summary     Date:  08/28/20 Room / Location:  [facility] OR 03 / [facility]    Anesthesia Start:  1301 Anesthesia Stop:  6854    Procedure:  LEFT EYE PARS PLANA VITRECTOMY 25 GAUGE RETINAL DETACHMENT REPAIR LASER AND GAS FLUID EXCHANGE (SF6) (Left Eye) Diagnosis:  (RETINAL DETACHMENT)    Surgeon:  Arsenio Quach MD Responsible Provider:  Edith Young DO    Anesthesia Type:  MAC ASA Status:  3          Anesthesia Type: No value filed. Uri Phase I: Uri Score: 10    Uri Phase II: Uri Score: 10    Last vitals: Reviewed and per EMR flowsheets.        Anesthesia Post Evaluation    Patient location during evaluation: PACU  Patient participation: complete - patient participated  Level of consciousness: awake and alert  Airway patency: patent  Nausea & Vomiting: no nausea and no vomiting  Complications: no  Cardiovascular status: hemodynamically stable  Respiratory status: acceptable  Hydration status: euvolemic

## 2020-08-28 NOTE — PROGRESS NOTES
Discharge instructions given to patient. Verbalized understanding. Patient discharged home with family.
Per COVID testing guidelines, pt does not require COVID test prior to procedure.
greatly appreciate your comments    [x] Please notify surgeon if you develop any illness between now and time of surgery (cold, cough, sore throat, fever, nausea, vomiting) or any signs of infections  including skin, wounds, and dental.

## 2020-08-29 NOTE — OP NOTE
[address] [address]                                OPERATIVE REPORT    PATIENT NAME: Giorgi Russo                     :        1965  MED REC NO:   [id number]                            ROOM:  ACCOUNT NO:   [de-identified]                           ADMIT DATE: 2020  PROVIDER:     Emma Navarrete MD    DATE OF PROCEDURE:  2020    SURGEON:  Emma Navarrete MD.    ASSISTANT:  Kenyatta Gibbons. 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 POSTOPERATIVE DIAGNOSIS:  Macula-off rhegmatogenous retinal detachment  of the left eye. PROCEDURE PERFORMED:  A 25-gauge vitrectomy with retinal detachment  repair of the left eye, air-fluid exchange, endolaser, and 18% SF6 air  exchange. COMPLICATIONS:  None. ESTIMATED BLOOD LOSS:  Less than 1 mL. CLINICAL NOTE:  The patient is a 58-year-old male that presented with  decreased vision in his left eye after flashing lights and  photos. He was found to have a fast progressing retinal detachment that  already involved the macula with a posterior tear and a posterior hole. Risks and benefits of vitrectomy surgery were discussed with the patient  in layman's terms. After understanding all the risks and benefits, he  agreed to undergo the procedure. DESCRIPTION OF PROCEDURE:  After proper informed consent was obtained,  the patient was brought to the operating room and placed in the supine  position, where some sedation was given. Following this, a retrobulbar  block was administered to the left retrobulbar space. Approximately 7  mL of a 50:50 mixture of 0.5% Marcaine with 2% lidocaine without  epinephrine was used. After adequate anesthesia and akinesia, the  patient was then prepped and draped in the usual fashion for ophthalmic  surgery of his left eye.   A Carlos wire lid speculum was inserted  into the conjunctival fornices. Trocars were introduced  inferotemporally, superotemporally, and superonasally each 4 mm  posterior to the limbus with gentle conjunctival displacement at a  10-degree angle. Following this, an infusion cannula was opened under  direct visualization. A core vitrectomy was carried out with a 25-gauge  Eduar vitrectomy system. There already was a posterior vitreous  detachment. The vitreous was carefully trimmed in a skirt-like fashion  for 360 degrees. After this was done, then scleral-depressed  examination revealed a posterior tear and a posterior hole  superotemporally. No other retinal tear, hole, or detachment was noted. The breaks were marked with cautery and the posterior break was used for  drainage, so no drainage retinotomy was performed. Endolaser was placed  in the area of attached retina between the vitreous base and the ora  annette and then an air-fluid exchange was performed draining the  retinal detachment and then Endolaser was placed around the causative  retinal break and hole as well as in the area of previously detached  retina between the vitreous base and the ora annette. After this was  done, there was adequate drainage and an 18% SF6 was exchanged for the  air after 40 mL flush. The trocars were removed. Each found to be  self-sealing at a pressure of 15. Subconjunctival dexamethasone and  Ancef were injected into the conjunctiva. TobraDex ointment was placed  in the eye. The eye was patched and shielded. The patient returned to  the recovery room in stable condition.         Anil Chatterjee MD    D: 08/28/2020 14:18:56       T: 08/28/2020 16:24:40     [email or web address]  Job#: [id number]     Doc#: [id number]    CC:

## 2020-08-30 ENCOUNTER — TELEPHONE (OUTPATIENT)
Dept: FAMILY MEDICINE CLINIC | Age: 55
End: 2020-08-30

## 2020-08-30 NOTE — TELEPHONE ENCOUNTER
Patient was seen in hospital by ophthalmology for retinal detachment and surgery with repair    It has been since July 2019 since last visit  Patient should have follow-up if still coming    Thanks

## 2020-08-30 NOTE — LETTER
[address] [place] [address] Drive  [address] [place] [address]  Phone: [phone]  Fax: [phone]    Anila Craig MD        September 9, 2020    [address]      Dear Najma [place]evilla:    Our records indicate that you are past dur for an appointment. Please call our office to schedule an appointment.         Sincerely,        Anila Craig MD

## 2021-03-20 ENCOUNTER — IMMUNIZATION (OUTPATIENT)
Dept: PRIMARY CARE CLINIC | Age: 56
End: 2021-03-20
Payer: COMMERCIAL

## 2021-03-20 PROCEDURE — 91301 COVID-19, MODERNA VACCINE 100MCG/0.5ML DOSE: CPT | Performed by: FAMILY MEDICINE

## 2021-03-20 PROCEDURE — 0011A COVID-19, MODERNA VACCINE 100MCG/0.5ML DOSE: CPT | Performed by: FAMILY MEDICINE

## 2021-04-28 ENCOUNTER — IMMUNIZATION (OUTPATIENT)
Dept: PRIMARY CARE CLINIC | Age: 56
End: 2021-04-28
Payer: COMMERCIAL

## 2021-04-28 PROCEDURE — 91301 COVID-19, MODERNA VACCINE 100MCG/0.5ML DOSE: CPT | Performed by: PHYSICIAN ASSISTANT

## 2021-04-28 PROCEDURE — 0012A COVID-19, MODERNA VACCINE 100MCG/0.5ML DOSE: CPT | Performed by: PHYSICIAN ASSISTANT

## 2021-06-11 ENCOUNTER — HOSPITAL ENCOUNTER (OUTPATIENT)
Age: 56
Setting detail: OBSERVATION
Discharge: HOME | End: 2021-06-13
Attending: EMERGENCY MEDICINE | Admitting: INTERNAL MEDICINE
Payer: COMMERCIAL

## 2021-06-11 ENCOUNTER — APPOINTMENT (OUTPATIENT)
Dept: ULTRASOUND IMAGING | Age: 56
End: 2021-06-11
Payer: COMMERCIAL

## 2021-06-11 ENCOUNTER — APPOINTMENT (OUTPATIENT)
Dept: CT IMAGING | Age: 56
End: 2021-06-11
Payer: COMMERCIAL

## 2021-06-11 DIAGNOSIS — S22.31XA CLOSED FRACTURE OF ONE RIB OF RIGHT SIDE, INITIAL ENCOUNTER: ICD-10-CM

## 2021-06-11 DIAGNOSIS — I26.99 BILATERAL PULMONARY EMBOLISM (HCC): Primary | ICD-10-CM

## 2021-06-11 LAB
ANION GAP SERPL CALCULATED.3IONS-SCNC: 14 MMOL/L (ref 7–16)
BACTERIA: ABNORMAL /HPF
BASOPHILS ABSOLUTE: 0 E9/L (ref 0–0.2)
BASOPHILS RELATIVE PERCENT: 0 % (ref 0–2)
BILIRUBIN URINE: NEGATIVE
BLOOD, URINE: NORMAL
BUN BLDV-MCNC: 43 MG/DL (ref 6–20)
CALCIUM SERPL-MCNC: 9.4 MG/DL (ref 8.6–10.2)
CHLORIDE BLD-SCNC: 101 MMOL/L (ref 98–107)
CLARITY: CLEAR
CO2: 23 MMOL/L (ref 22–29)
COLOR: YELLOW
CREAT SERPL-MCNC: 1.5 MG/DL (ref 0.7–1.2)
EOSINOPHILS ABSOLUTE: 0.33 E9/L (ref 0.05–0.5)
EOSINOPHILS RELATIVE PERCENT: 2 % (ref 0–6)
EPITHELIAL CELLS, UA: ABNORMAL /HPF
GFR AFRICAN AMERICAN: 58
GFR NON-AFRICAN AMERICAN: 48 ML/MIN/1.73
GLUCOSE BLD-MCNC: 172 MG/DL (ref 74–99)
GLUCOSE URINE: NEGATIVE MG/DL
HCT VFR BLD CALC: 51.8 % (ref 37–54)
HEMOGLOBIN: 17.2 G/DL (ref 12.5–16.5)
HYALINE CASTS: ABNORMAL /LPF (ref 0–2)
KETONES, URINE: NEGATIVE MG/DL
LEUKOCYTE ESTERASE, URINE: NEGATIVE
LYMPHOCYTES ABSOLUTE: 3.42 E9/L (ref 1.5–4)
LYMPHOCYTES RELATIVE PERCENT: 21 % (ref 20–42)
MCH RBC QN AUTO: 29.5 PG (ref 26–35)
MCHC RBC AUTO-ENTMCNC: 33.2 % (ref 32–34.5)
MCV RBC AUTO: 88.9 FL (ref 80–99.9)
MONOCYTES ABSOLUTE: 1.3 E9/L (ref 0.1–0.95)
MONOCYTES RELATIVE PERCENT: 8 % (ref 2–12)
NEUTROPHILS ABSOLUTE: 11.25 E9/L (ref 1.8–7.3)
NEUTROPHILS RELATIVE PERCENT: 69 % (ref 43–80)
NITRITE, URINE: NEGATIVE
PDW BLD-RTO: 13.9 FL (ref 11.5–15)
PH UA: 5 (ref 5–9)
PLATELET # BLD: 287 E9/L (ref 130–450)
PMV BLD AUTO: 9 FL (ref 7–12)
POTASSIUM SERPL-SCNC: 4 MMOL/L (ref 3.5–5)
PRO-BNP: 4499 PG/ML (ref 0–125)
PROTEIN UA: NORMAL MG/DL
RBC # BLD: 5.83 E12/L (ref 3.8–5.8)
RBC # BLD: NORMAL 10*6/UL
RBC UA: ABNORMAL /HPF (ref 0–2)
SODIUM BLD-SCNC: 138 MMOL/L (ref 132–146)
SPECIFIC GRAVITY UA: >=1.03 (ref 1–1.03)
TROPONIN, HIGH SENSITIVITY: 34 NG/L (ref 0–11)
TROPONIN, HIGH SENSITIVITY: 42 NG/L (ref 0–11)
UROBILINOGEN, URINE: 1 E.U./DL
WBC # BLD: 16.3 E9/L (ref 4.5–11.5)
WBC UA: ABNORMAL /HPF (ref 0–5)

## 2021-06-11 PROCEDURE — 6360000004 HC RX CONTRAST MEDICATION: Performed by: RADIOLOGY

## 2021-06-11 PROCEDURE — 80048 BASIC METABOLIC PNL TOTAL CA: CPT

## 2021-06-11 PROCEDURE — 84484 ASSAY OF TROPONIN QUANT: CPT

## 2021-06-11 PROCEDURE — 96372 THER/PROPH/DIAG INJ SC/IM: CPT

## 2021-06-11 PROCEDURE — 96374 THER/PROPH/DIAG INJ IV PUSH: CPT

## 2021-06-11 PROCEDURE — 85025 COMPLETE CBC W/AUTO DIFF WBC: CPT

## 2021-06-11 PROCEDURE — 99285 EMERGENCY DEPT VISIT HI MDM: CPT

## 2021-06-11 PROCEDURE — 99220 PR INITIAL OBSERVATION CARE/DAY 70 MINUTES: CPT | Performed by: INTERNAL MEDICINE

## 2021-06-11 PROCEDURE — 83880 ASSAY OF NATRIURETIC PEPTIDE: CPT

## 2021-06-11 PROCEDURE — 6370000000 HC RX 637 (ALT 250 FOR IP): Performed by: INTERNAL MEDICINE

## 2021-06-11 PROCEDURE — 81001 URINALYSIS AUTO W/SCOPE: CPT

## 2021-06-11 PROCEDURE — G0378 HOSPITAL OBSERVATION PER HR: HCPCS

## 2021-06-11 PROCEDURE — 71250 CT THORAX DX C-: CPT

## 2021-06-11 PROCEDURE — 71275 CT ANGIOGRAPHY CHEST: CPT

## 2021-06-11 PROCEDURE — 6360000002 HC RX W HCPCS: Performed by: EMERGENCY MEDICINE

## 2021-06-11 PROCEDURE — 93970 EXTREMITY STUDY: CPT

## 2021-06-11 PROCEDURE — 2580000003 HC RX 258: Performed by: INTERNAL MEDICINE

## 2021-06-11 PROCEDURE — 2580000003 HC RX 258: Performed by: EMERGENCY MEDICINE

## 2021-06-11 PROCEDURE — 93005 ELECTROCARDIOGRAM TRACING: CPT | Performed by: EMERGENCY MEDICINE

## 2021-06-11 RX ORDER — DEXTROAMPHETAMINE SACCHARATE, AMPHETAMINE ASPARTATE, DEXTROAMPHETAMINE SULFATE AND AMPHETAMINE SULFATE 7.5; 7.5; 7.5; 7.5 MG/1; MG/1; MG/1; MG/1
30 TABLET ORAL 2 TIMES DAILY
COMMUNITY
Start: 2021-06-08 | End: 2021-09-06

## 2021-06-11 RX ORDER — DULOXETIN HYDROCHLORIDE 60 MG/1
60 CAPSULE, DELAYED RELEASE ORAL DAILY
Status: DISCONTINUED | OUTPATIENT
Start: 2021-06-11 | End: 2021-06-13 | Stop reason: HOSPADM

## 2021-06-11 RX ORDER — LISINOPRIL 20 MG/1
40 TABLET ORAL DAILY
Status: DISCONTINUED | OUTPATIENT
Start: 2021-06-11 | End: 2021-06-13 | Stop reason: HOSPADM

## 2021-06-11 RX ORDER — QUETIAPINE FUMARATE 100 MG/1
200 TABLET, FILM COATED ORAL NIGHTLY
Status: DISCONTINUED | OUTPATIENT
Start: 2021-06-11 | End: 2021-06-13 | Stop reason: HOSPADM

## 2021-06-11 RX ORDER — DULOXETIN HYDROCHLORIDE 60 MG/1
60 CAPSULE, DELAYED RELEASE ORAL DAILY
COMMUNITY
Start: 2020-12-03

## 2021-06-11 RX ORDER — SODIUM CHLORIDE 9 MG/ML
INJECTION, SOLUTION INTRAVENOUS CONTINUOUS
Status: ACTIVE | OUTPATIENT
Start: 2021-06-11 | End: 2021-06-12

## 2021-06-11 RX ORDER — QUETIAPINE FUMARATE 200 MG/1
200 TABLET, FILM COATED ORAL NIGHTLY
Status: ON HOLD | COMMUNITY
Start: 2020-10-06 | End: 2021-06-11

## 2021-06-11 RX ORDER — SODIUM CHLORIDE 9 MG/ML
25 INJECTION, SOLUTION INTRAVENOUS PRN
Status: DISCONTINUED | OUTPATIENT
Start: 2021-06-11 | End: 2021-06-13 | Stop reason: HOSPADM

## 2021-06-11 RX ORDER — SODIUM CHLORIDE 0.9 % (FLUSH) 0.9 %
10 SYRINGE (ML) INJECTION EVERY 12 HOURS SCHEDULED
Status: DISCONTINUED | OUTPATIENT
Start: 2021-06-11 | End: 2021-06-13 | Stop reason: HOSPADM

## 2021-06-11 RX ORDER — SODIUM CHLORIDE 0.9 % (FLUSH) 0.9 %
10 SYRINGE (ML) INJECTION PRN
Status: DISCONTINUED | OUTPATIENT
Start: 2021-06-11 | End: 2021-06-13 | Stop reason: HOSPADM

## 2021-06-11 RX ORDER — ACETAMINOPHEN 325 MG/1
650 TABLET ORAL EVERY 6 HOURS PRN
Status: DISCONTINUED | OUTPATIENT
Start: 2021-06-11 | End: 2021-06-13 | Stop reason: HOSPADM

## 2021-06-11 RX ORDER — 0.9 % SODIUM CHLORIDE 0.9 %
1000 INTRAVENOUS SOLUTION INTRAVENOUS ONCE
Status: COMPLETED | OUTPATIENT
Start: 2021-06-11 | End: 2021-06-11

## 2021-06-11 RX ORDER — ACETAMINOPHEN 650 MG/1
650 SUPPOSITORY RECTAL EVERY 6 HOURS PRN
Status: DISCONTINUED | OUTPATIENT
Start: 2021-06-11 | End: 2021-06-13 | Stop reason: HOSPADM

## 2021-06-11 RX ORDER — DEXTROAMPHETAMINE SACCHARATE, AMPHETAMINE ASPARTATE, DEXTROAMPHETAMINE SULFATE AND AMPHETAMINE SULFATE 7.5; 7.5; 7.5; 7.5 MG/1; MG/1; MG/1; MG/1
30 TABLET ORAL 2 TIMES DAILY
Status: DISCONTINUED | OUTPATIENT
Start: 2021-06-11 | End: 2021-06-13 | Stop reason: HOSPADM

## 2021-06-11 RX ORDER — TRIAMTERENE AND HYDROCHLOROTHIAZIDE 37.5; 25 MG/1; MG/1
1 TABLET ORAL DAILY
Status: ON HOLD | COMMUNITY
End: 2021-06-13 | Stop reason: HOSPADM

## 2021-06-11 RX ORDER — KETOROLAC TROMETHAMINE 30 MG/ML
15 INJECTION, SOLUTION INTRAMUSCULAR; INTRAVENOUS ONCE
Status: COMPLETED | OUTPATIENT
Start: 2021-06-11 | End: 2021-06-11

## 2021-06-11 RX ORDER — POLYETHYLENE GLYCOL 3350 17 G/17G
17 POWDER, FOR SOLUTION ORAL DAILY PRN
Status: DISCONTINUED | OUTPATIENT
Start: 2021-06-11 | End: 2021-06-13 | Stop reason: HOSPADM

## 2021-06-11 RX ADMIN — QUETIAPINE FUMARATE 200 MG: 100 TABLET ORAL at 21:23

## 2021-06-11 RX ADMIN — ENOXAPARIN SODIUM 120 MG: 120 INJECTION SUBCUTANEOUS at 17:37

## 2021-06-11 RX ADMIN — KETOROLAC TROMETHAMINE 15 MG: 30 INJECTION, SOLUTION INTRAMUSCULAR; INTRAVENOUS at 14:31

## 2021-06-11 RX ADMIN — APIXABAN 10 MG: 5 TABLET, FILM COATED ORAL at 23:29

## 2021-06-11 RX ADMIN — LISINOPRIL 40 MG: 20 TABLET ORAL at 21:25

## 2021-06-11 RX ADMIN — SODIUM CHLORIDE, PRESERVATIVE FREE 10 ML: 5 INJECTION INTRAVENOUS at 21:25

## 2021-06-11 RX ADMIN — DULOXETINE HYDROCHLORIDE 60 MG: 60 CAPSULE, DELAYED RELEASE ORAL at 21:25

## 2021-06-11 RX ADMIN — SODIUM CHLORIDE: 9 INJECTION, SOLUTION INTRAVENOUS at 21:22

## 2021-06-11 RX ADMIN — SODIUM CHLORIDE 1000 ML: 9 INJECTION, SOLUTION INTRAVENOUS at 16:06

## 2021-06-11 RX ADMIN — IOPAMIDOL 75 ML: 755 INJECTION, SOLUTION INTRAVENOUS at 16:47

## 2021-06-11 ASSESSMENT — ENCOUNTER SYMPTOMS
EYE PAIN: 0
DIARRHEA: 0
EYE DISCHARGE: 0
SHORTNESS OF BREATH: 1
WHEEZING: 0
SINUS PRESSURE: 0
VOMITING: 0
COUGH: 0
EYE REDNESS: 0
NAUSEA: 0
BACK PAIN: 0
ABDOMINAL PAIN: 0
SORE THROAT: 0

## 2021-06-11 ASSESSMENT — PAIN DESCRIPTION - PAIN TYPE: TYPE: ACUTE PAIN

## 2021-06-11 ASSESSMENT — PAIN SCALES - GENERAL
PAINLEVEL_OUTOF10: 8
PAINLEVEL_OUTOF10: 4
PAINLEVEL_OUTOF10: 7
PAINLEVEL_OUTOF10: 0

## 2021-06-11 ASSESSMENT — PAIN DESCRIPTION - DESCRIPTORS: DESCRIPTORS: THROBBING;STABBING

## 2021-06-11 NOTE — ED PROVIDER NOTES
58-year-old male presents to the emergency department after mechanical fall 2 weeks ago at home complaining of shortness of breath. He was given seen approximately week ago and had x-rays done of the left ribs that was negative for fracture. But states he woke up today with worsening shortness of breath and pain on that left side which the pain is persisted. Patient states he also stood up and got dizzy. He states no chest pain other than where he is tender on his ribs. He states no nausea vomiting diarrhea abdominal pain urinary symptoms head injury headache or vision changes. He states he has taken intermittently some anti-inflammatory medicine is really not helped his pain. He states no other complaints at this time. The history is provided by the patient. Chest Pain  Pain location:  L chest and L lateral chest  Pain quality: aching    Pain radiates to:  Does not radiate  Pain severity:  Mild  Onset quality:  Gradual  Duration:  2 weeks  Timing:  Intermittent  Progression:  Waxing and waning  Chronicity:  New  Relieved by:  Nothing  Worsened by:  Nothing  Ineffective treatments:  None tried  Associated symptoms: dizziness and shortness of breath    Associated symptoms: no abdominal pain, no back pain, no cough, no fever, no headache, no nausea, no vomiting and no weakness         Review of Systems   Constitutional: Negative for chills and fever. HENT: Negative for ear pain, sinus pressure and sore throat. Eyes: Negative for pain, discharge and redness. Respiratory: Positive for shortness of breath. Negative for cough and wheezing. Cardiovascular: Positive for chest pain. Gastrointestinal: Negative for abdominal pain, diarrhea, nausea and vomiting. Genitourinary: Negative for dysuria and frequency. Musculoskeletal: Negative for arthralgias and back pain. Skin: Negative for rash and wound. Neurological: Positive for dizziness. Negative for weakness and headaches.    Hematological: Negative for adenopathy. All other systems reviewed and are negative. Physical Exam  Constitutional:       Appearance: He is well-developed. HENT:      Head: Normocephalic. Cardiovascular:      Rate and Rhythm: Normal rate and regular rhythm. Pulmonary:      Effort: Pulmonary effort is normal.      Breath sounds: Normal breath sounds. No decreased breath sounds, wheezing, rhonchi or rales. Chest:       Abdominal:      General: Bowel sounds are normal.      Palpations: Abdomen is soft. Musculoskeletal:         General: Normal range of motion. Cervical back: Normal range of motion and neck supple. Right lower leg: No edema. Left lower leg: No edema. Skin:     General: Skin is warm. Capillary Refill: Capillary refill takes less than 2 seconds. Neurological:      General: No focal deficit present. Mental Status: He is alert and oriented to person, place, and time. Procedures     MDM  Number of Diagnoses or Management Options  Diagnosis management comments: Patient seen and examined. Patient appears to have a low pulse ox reading in the 90-92 range. Initial work-up reveals chest CT that shows a rib fracture on the right side. However this really does not explain patient's hypoxia. CT of the chest was performed reveals bilateral PEs. Patient's case was discussed with the hospitalist who admit for further evaluation patient given Lovenox here in the emergency department. ED Course as of Jun 14 2127 Fri Jun 11, 2021   1444 EKG: This EKG is signed and interpreted by the EP.     Time: 14:38  Rate: 99  Rhythm: Sinus  Interpretation: NSR  Comparison: was normal      [CF]      ED Course User Index  [CF] Maira Jang DO     --------------------------------------------- PAST HISTORY ---------------------------------------------  Past Medical History:  has a past medical history of Anxiety, Depression, Diabetes mellitus ([address] Fibromyalgia, Left retinal 2.0 %    Neutrophils Absolute 9.77 (H) 1.80 - 7.30 E9/L    Immature Granulocytes # 0.15 E9/L    Lymphocytes Absolute 2.24 1.50 - 4.00 E9/L    Monocytes Absolute 1.41 (H) 0.10 - 0.95 E9/L    Eosinophils Absolute 0.31 0.05 - 0.50 E9/L    Basophils Absolute 0.03 0.00 - 0.20 R9/J   Basic Metabolic Panel w/ Reflex to MG   Result Value Ref Range    Sodium 139 132 - 146 mmol/L    Potassium reflex Magnesium 3.5 3.5 - 5.0 mmol/L    Chloride 107 98 - 107 mmol/L    CO2 22 22 - 29 mmol/L    Anion Gap 10 7 - 16 mmol/L    Glucose 127 (H) 74 - 99 mg/dL    BUN 34 (H) 6 - 20 mg/dL    CREATININE 1.1 0.7 - 1.2 mg/dL    GFR Non-African American >60 >=60 mL/min/1.73    GFR African American >60     Calcium 8.8 8.6 - 10.2 mg/dL   CBC auto differential   Result Value Ref Range    WBC 10.0 4.5 - 11.5 E9/L    RBC 5.19 3.80 - 5.80 E12/L    Hemoglobin 15.3 12.5 - 16.5 g/dL    Hematocrit 46.7 37.0 - 54.0 %    MCV 90.0 80.0 - 99.9 fL    MCH 29.5 26.0 - 35.0 pg    MCHC 32.8 32.0 - 34.5 %    RDW 13.8 11.5 - 15.0 fL    Platelets 130 921 - 021 E9/L    MPV 9.3 7.0 - 12.0 fL    Neutrophils % 63.0 43.0 - 80.0 %    Immature Granulocytes % 0.9 0.0 - 5.0 %    Lymphocytes % 22.4 20.0 - 42.0 %    Monocytes % 9.5 2.0 - 12.0 %    Eosinophils % 4.0 0.0 - 6.0 %    Basophils % 0.2 0.0 - 2.0 %    Neutrophils Absolute 6.32 1.80 - 7.30 E9/L    Immature Granulocytes # 0.09 E9/L    Lymphocytes Absolute 2.24 1.50 - 4.00 E9/L    Monocytes Absolute 0.95 0.10 - 0.95 E9/L    Eosinophils Absolute 0.40 0.05 - 0.50 E9/L    Basophils Absolute 0.02 0.00 - 0.20 E9/L   Magnesium   Result Value Ref Range    Magnesium 2.3 1.6 - 2.6 mg/dL   EKG 12 Lead   Result Value Ref Range    Ventricular Rate 99 BPM    Atrial Rate 99 BPM    P-R Interval 134 ms    QRS Duration 80 ms    Q-T Interval 366 ms    QTc Calculation (Bazett) 469 ms    P Axis 8 degrees    R Axis 52 degrees    T Axis 58 degrees       Radiology  Echo Complete    Result Date: 6/12/2021  Transthoracic Echocardiography Report (TTE)  Demographics   Patient Name    Lily Vital Gender            Male                  M   Medical Record  [id number]     Room Number       5406  Number   Account #       [de-identified]    Procedure Date    06/12/2021   Corporate ID                 Ordering                               Physician   Accession       [id number]   Referring         JEMAL June                       Physician         Jessica Leon MD   Date of Birth   1965   Sonographer       Rachel Curry   Age             61 year(s)   Interpreting      [address] [place] [address]                               Physician         Physician Cardiology                                                 Juan Crenshaw DO                                Any Other  Procedure Type of Study   TTE procedure:Echo Complete W/Doppler & Color Flow. Procedure Date Date: 06/12/2021 Start: 07:00 AM Study Location: Portable Technical Quality: Adequate visualization Indications:Pulmonary embolus. Patient Status: Routine Height: 75 inches Weight: 278 pounds BSA: 2.52 m^2 BMI: 34.75 kg/m^2 HR: 94 bpm BP: 128/90 mmHg  Findings   Left Ventricle  Ejection fraction is visually estimated at 60%. No regional wall motion  abnormalities seen. Mild left ventricular concentric hypertrophy noted. Left ventricle size is normal. Normal left ventricular diastolic filling  pattern for age. Right Ventricle  Normal right ventricle structure and function. TAPSE is 1.7 cm. Left Atrium  Left atrial volume index of 22 ml per meters squared BSA. Right Atrium  Normal right atrium. Mitral Valve  Mild mitral annular calcification. No evidence of mitral valve stenosis. Physiologic and/or trace mitral regurgitation is present. Tricuspid Valve  The tricuspid valve appears structurally normal.  Physiologic and/or trace tricuspid regurgitation. Aortic Valve  The aortic valve is trileaflet. No hemodynamically significant aortic  stenosis is present.  No evidence of aortic valve regurgitation. Pulmonic Valve  Pulmonic valve is structurally normal. Physiologic and/or trace pulmonic  regurgitation present. Pericardial Effusion  No evidence for hemodynamically significant pericardial effusion. Aorta  Normal aortic root and ascending aorta. Miscellaneous  The inferior vena cava diameter is normal with normal respiratory  variation. Conclusions   Summary  Ejection fraction is visually estimated at 60%. No regional wall motion abnormalities seen. Mild left ventricular concentric hypertrophy noted. Normal right ventricle structure and function. Physiologic and/or trace mitral regurgitation is present. Physiologic and/or trace tricuspid regurgitation.    Signature   ----------------------------------------------------------------  Electronically signed by Vane Buck DO(Interpreting  physician) on 06/12/2021 03:36 PM  ----------------------------------------------------------------  M-Mode/2D Measurements & Calculations   LV Diastolic    LV Systolic Dimension: 2.6   AV Cusp Separation: 2 cmLA  Dimension: 3.7  cm                           Dimension: 3 cmAO Root  cm              LV Volume Diastolic: 50.3 ml Dimension: 3.7 cm  LV FS:29.7 %    LV Volume Systolic: 23.9 ml  LV PW           LV EDV/LV EDV Index: 93.5  Diastolic: 1 cm PE/63 OX/C^6RD ESV/LV ESV  LV PW Systolic: Index: 62.0 WI/1QZ/ m^2      RV Diastolic Dimension: 4.7  1.5 cm          EF Calculated: 58.5 %        cm  Septum          LV Mass Index: 51 l/min*m^2  Diastolic: 1.2                               LA/Aorta: 0.81  cm                                           Ascending Aorta: 3.3 cm  Septum          LVOT: 2 cm                   LA volume/Index: 55 ml  Systolic: 1.5                                /21.6ml/m^2  cm                                           RA Area: 13.9 cm^2  CO: 6.29 l/min  CI: 2.5 l/m*m^2                              IVC Expiration: 1.8 cm  LV Mass: 129.33  g  Doppler Measurements & Calculations   MV Peak E-Wave: 0.33 AV Peak Velocity:     LVOT Peak Velocity: 1.08 m/s  m/s                  1.35 m/s              LVOT Mean Velocity: 0.76 m/s  MV Peak A-Wave: 0.69 AV Peak Gradient:     LVOT Peak Gradient: 4.6  m/s                  7.25 mmHg             mmHgLVOT Mean Gradient: 2.6  MV E/A Ratio: 0.47   AV Mean Velocity:     mmHg  MV Peak Gradient:    0.94 m/s              Estimated RVSP: 27 mmHg  2.7 mmHg             AV Mean Gradient: 4.1 Estimated RAP:3 mmHg  MV Mean Gradient:    mmHg  0.9 mmHg             AV VTI: 21.4 cm  MV Mean Velocity:    AV Area               TR Velocity:2.45 m/s  0.44 m/s             (Continuity):3.13     TR Gradient:24.01 mmHg  MV Deceleration      cm^2                  PV Peak Velocity: 0.54 m/s  Time: 181.9 msec                           PV Peak Gradient: 1.15 mmHg  MV P1/2t: 47 msec    LVOT VTI: 21.3 cm     PV Mean Velocity: 0.36 m/s  MVA by PHT:4.68 cm^2                       PV Mean Gradient: 0.6 mmHg  MV Area              Estimated PASP: 27.01  (continuity): 4.4    mmHg  cm^2  MV E' Septal  Velocity: 0.07 m/s  MV E' Lateral  Velocity: 10 m/s  [email or web address] [id number]    CT CHEST WO CONTRAST    Result Date: 6/11/2021  EXAMINATION: CT OF THE CHEST WITHOUT CONTRAST 6/11/2021 2:51 pm TECHNIQUE: CT of the chest was performed without the administration of intravenous contrast. Multiplanar reformatted images are provided for review. Dose modulation, iterative reconstruction, and/or weight based adjustment of the mA/kV was utilized to reduce the radiation dose to as low as reasonably achievable. Dose is total DLP by 53.32 M Gy cm. COMPARISON: There is an acute displaced slightly depressed and slightly overriding fracture of the lateral right 6th rib with moderate overlying pleural thickening.  HISTORY: ORDERING SYSTEM PROVIDED HISTORY: shortness of breath, cough, recent fall on left side TECHNOLOGIST PROVIDED HISTORY: Reason for exam:->shortness of breath, cough, recent fall on left side Decision Support Exception - unselect if not a suspected or confirmed emergency medical condition->Emergency Medical Condition (MA) FINDINGS: Mediastinum: Heart size normal.  No aortic aneurysm. Normal size of the thoracic aorta and pulmonary arteries. No pericardial effusion. No mediastinal hilar lymphadenopathy. Normal thyroid. Normal esophagus, no hiatal hernia. Lungs/pleura: No infiltrate. No evidence of lung contusion. No effusion or pneumothorax. Adequate lung volumes. No emphysematous or fibrotic changes. Upper Abdomen: Cholecystectomy clips. Mild fatty change in the liver. No obvious liver masses or bile duct dilatation. The common bile duct measures 2 mm. Pancreas and spleen are not enlarged. Adrenal glands are not enlarged. The right kidney has a 13 mm poorly seen round low-density structure in the lateral mid pole cortex and a 2.6 cm poorly seen round low-density structure in the posterior lower pole cortex. A 4 mm nonobstructing stone in the anterior right lower pole. The left kidney has a 1.8 cm round low-density structure in the superolateral margin. Adjacent exophytic hyperdense nodule 1.3 cm. There is a 3 mm hyperdense nodule in the lateral upper pole. A poorly seen 2.3 cm round low-density structure in the posterior mid left kidney with 2 peripheral calcified nodules. A 5 mm probable cyst in the midpole cortex. The lower poles of the kidneys are not included. No evidence for a collecting system stone on the left. No hydronephrosis. No ascites. Soft Tissues/Bones: An acute displaced, slightly depressed and slightly overriding fracture of the lateral right 6th rib with moderate overlying pleural thickening. No other rib fractures. No pneumothorax or effusion. Left ribs are intact. A mild levoconvex upper thoracic curvature.   Moderate spurring in the mid and lower aortic aneurysm or dissection. No pericardial effusion. Normal esophagus, no hiatal hernia. Normal thyroid. A few tiny right paratracheal lymph nodes. No axillary adenopathy. Mediastinum: No evidence of mediastinal lymphadenopathy. The heart and pericardium demonstrate no acute abnormality. There is no acute abnormality of the thoracic aorta. Lungs/pleura: No acute lung infiltrates. No significant atelectasis. No effusion or pneumothorax. Again noted is right 6th rib fracture with moderate pleural thickening. Upper Abdomen: See the prior CT chest report for details on CT abdomen. Bilateral renal cysts. Small nodule projecting off the superolateral left kidney does not show significant enhancement and is likely a proteinaceous cyst.  Cholecystectomy clips. Soft Tissues/Bones: Bones: Fracture right lateral 6th rib. Chronic thoracic spurs. No compression fracture. 1. Positive for moderate bilateral pulmonary emboli. 2. Mild RV strain. 3. No lung infiltrates or effusions. 4. Slightly depressed lateral right 6th rib fracture. 5. Report called to Dr. Natasha Castro in the emergency room at 1710 hours 06/11/2021. US DUP LOWER EXTREMITIES BILATERAL VENOUS    Result Date: 6/11/2021  EXAMINATION: DUPLEX VENOUS ULTRASOUND OF THE BILATERAL LOWER EXTREMITIES, 6/11/2021 6:46 pm TECHNIQUE: Duplex ultrasound using B-mode/gray scaled imaging and Doppler spectral analysis and color flow was obtained of the bilateral lower extremities. COMPARISON: None. HISTORY: ORDERING SYSTEM PROVIDED HISTORY: r/o dvt TECHNOLOGIST PROVIDED HISTORY: Reason for exam:->r/o dvt What reading provider will be dictating this exam?->CRC Acute onset short of breath, distress, CT positive for bilateral PE. FINDINGS: The visualized veins of the bilateral lower extremities are patent and free of echogenic thrombus. The veins demonstrate good compressibility with normal color flow study and spectral analysis.   There is currently no evidence for deep venous thrombosis right or left leg. Good color flow with augmentation and compression at the common femoral vein, superficial femoral vein, popliteal vein. Flow is documented in all 3 major calf veins each leg. No evidence of DVT in either lower extremity.       ------------------------- NURSING NOTES AND VITALS REVIEWED ---------------------------  Date / Time Roomed:  6/11/2021  1:50 PM  ED Bed Assignment:  0603/0603-A    The nursing notes within the ED encounter and vital signs as below have been reviewed. No data found. Oxygen Saturation Interpretation: Normal      ------------------------------------------ PROGRESS NOTES ------------------------------------------      I have spoken with the patient and discussed todays results, in addition to providing specific details for the plan of care and counseling regarding the diagnosis and prognosis. Their questions are answered at this time and they are agreeable with the plan.      --------------------------------- ADDITIONAL PROVIDER NOTES ---------------------------------  This patient's ED course included: a personal history and physicial examination, re-evaluation prior to disposition, multiple bedside re-evaluations, IV medications, cardiac monitoring and continuous pulse oximetry    This patient has remained hemodynamically stable during their ED course. Please note that the withdrawal or failure to initiate urgent interventions for this patient would likely result in a life threatening deterioration or permanent disability. Accordingly this patient received 30 minutes of critical care time, excluding separately billable procedures. Clinical Impression  1. Bilateral pulmonary embolism (HCC)    2. Closed fracture of one rib of right side, initial encounter          Disposition  Patient's disposition: Admit to telemetry  Patient's condition is fair.           Alejo Riggs DO  06/14/21 2127

## 2021-06-11 NOTE — PROGRESS NOTES
Toney Ochoa was ordered Adderall 30 mg which is a nonformulary medication. The patient has indicated that the home supply of this medication will be brought in to the hospital for inpatient use. If the medication has not been administered by 1400 on the following day from the time the order was placed, a pharmacist will follow-up with the nurse of the patient to assess the capability of the patient to bring in the medication. If it is determined that the patient cannot supply the medication and it is not available to be dispensed from the pharmacy, a call will be placed to the ordering provider to discuss alternative options.

## 2021-06-11 NOTE — H&P
[facility] History and Physical      CHIEF COMPLAINT: Shortness of breath    History of Present Illness: 34-year-old male with a history of depression, fibromyalgia, essential hypertension follows at [facility] clinic.  4 weeks ago was started on Maxide. Since then he has been feeling some weakness, lightheadedness with a decrease in his blood pressure. Initially he was measuring about 130 but dropped as low as 100 recently. Continues to take his antihypertensives. 2 weeks ago he got up in the middle of the night to go to the bathroom and had a syncopal episode. His wife was awakened by the noise and he was alert when she saw him with no confusion. A few days later he noticed a sharp pain on the right side of his anterior chest that radiates to the back. Worse with position and movement. Some worsening with deep inspiration. Since then he has had gradually worsening shortness of breath that was more significant yesterday. Last night after getting out of the shower and was significantly more short of breath and was panting. Presented to the ED for further evaluation, found to have a right sixth rib fracture and moderate bilateral pulmonary emboli with mild heart strain. Given a dose of Lovenox referred for admission. No recent travel. Has stayed mostly in the recliner for the past week for long hours. No lower extremity edema. No history of VTE. No family history of VTE. In the ED he had positive orthostatic hypotension with drop in blood systolic blood pressure from 122-101. Pulse ox 92 to 94% on room air.     Informant(s) for H&P: Patient    REVIEW OF SYSTEMS:  A comprehensive 14 point review of systems was negative except for: what is in the HPI    PMH:  Past Medical History:   Diagnosis Date    Anxiety     Depression     Diabetes mellitus ([facility] [address]     Fibromyalgia     Left retinal detachment     Metabolic syndrome     Obesity        Surgical History:  Past Surgical History:   Procedure Laterality Date    CHOLECYSTECTOMY      RETINAL DETACHMENT SURGERY Left 8/28/2020    LEFT EYE PARS PLANA VITRECTOMY 25 GAUGE RETINAL DETACHMENT REPAIR LASER AND GAS FLUID EXCHANGE [facility]) performed by Phoebe Flood MD at [address] [place] [address]  2015    VASECTOMY  2004       Medications Prior to Admission:    Prior to Admission medications    Medication Sig Start Date End Date Taking? Authorizing Provider   lisinopril (PRINIVIL;ZESTRIL) 10 MG tablet Take by mouth daily    Historical Provider, MD   busPIRone (BUSPAR) 10 MG tablet Take 10 mg by mouth nightly    Historical Provider, MD   amphetamine-dextroamphetamine (ADDERALL) 30 MG tablet Take 30 mg by mouth. . 4/20/18   Historical Provider, MD   QUEtiapine (SEROQUEL) 200 MG tablet Take 400 mg by mouth nightly  11/22/17   Historical Provider, MD   DULoxetine (CYMBALTA) 60 MG extended release capsule Take 60 mg by mouth daily    Historical Provider, MD       Allergies:    Patient has no known allergies. Social History:    reports that he has never smoked. He has never used smokeless tobacco. He reports that he does not drink alcohol and does not use drugs. Family History:   No family history of VTE      PHYSICAL EXAM:  Vitals:  /70   Pulse 88   Temp 98.4 °F (36.9 °C) (Temporal)   Resp 14   Ht 6' 3\" (1.905 m)   Wt 278 lb (126.1 kg)   SpO2 91%   BMI 34.75 kg/m²   General Appearance: alert and oriented to person, place and time and in no acute distress  Skin: warm and dry, turgor not diminished  Head: normocephalic and atraumatic  Eyes: pupils equal, round, and reactive to light, extraocular eye movements intact, conjunctivae normal  Neck: neck supple and non tender without mass   Pulmonary/Chest: clear to auscultation bilaterally- no wheezes, rales or rhonchi, normal air movement, no respiratory distress.   Pain over his anterior chest.  Cardiovascular: normal rate, normal S1 and S2 and no M/R/R  Abdomen: soft, CKD stage III: Creatinine around baseline. Sees nephrology in [facility]. Depression: Resume home regimen    DVT prophylaxis: Lovenox      NOTE: This report was transcribed using voice recognition software. Every effort was made to ensure accuracy; however, inadvertent computerized transcription errors may be present.   Electronically signed by Mannie Israel MD on 6/11/2021 at 5:21 PM

## 2021-06-12 LAB
ANION GAP SERPL CALCULATED.3IONS-SCNC: 12 MMOL/L (ref 7–16)
BASOPHILS ABSOLUTE: 0.03 E9/L (ref 0–0.2)
BASOPHILS RELATIVE PERCENT: 0.2 % (ref 0–2)
BUN BLDV-MCNC: 46 MG/DL (ref 6–20)
CALCIUM SERPL-MCNC: 8.7 MG/DL (ref 8.6–10.2)
CHLORIDE BLD-SCNC: 105 MMOL/L (ref 98–107)
CO2: 21 MMOL/L (ref 22–29)
CREAT SERPL-MCNC: 1.3 MG/DL (ref 0.7–1.2)
EKG ATRIAL RATE: 99 BPM
EKG P AXIS: 8 DEGREES
EKG P-R INTERVAL: 134 MS
EKG Q-T INTERVAL: 366 MS
EKG QRS DURATION: 80 MS
EKG QTC CALCULATION (BAZETT): 469 MS
EKG R AXIS: 52 DEGREES
EKG T AXIS: 58 DEGREES
EKG VENTRICULAR RATE: 99 BPM
EOSINOPHILS ABSOLUTE: 0.31 E9/L (ref 0.05–0.5)
EOSINOPHILS RELATIVE PERCENT: 2.2 % (ref 0–6)
GFR AFRICAN AMERICAN: >60
GFR NON-AFRICAN AMERICAN: 57 ML/MIN/1.73
GLUCOSE BLD-MCNC: 142 MG/DL (ref 74–99)
HCT VFR BLD CALC: 46.3 % (ref 37–54)
HEMOGLOBIN: 15.3 G/DL (ref 12.5–16.5)
IMMATURE GRANULOCYTES #: 0.15 E9/L
IMMATURE GRANULOCYTES %: 1.1 % (ref 0–5)
LV EF: 60 %
LVEF MODALITY: NORMAL
LYMPHOCYTES ABSOLUTE: 2.24 E9/L (ref 1.5–4)
LYMPHOCYTES RELATIVE PERCENT: 16.1 % (ref 20–42)
MCH RBC QN AUTO: 29.8 PG (ref 26–35)
MCHC RBC AUTO-ENTMCNC: 33 % (ref 32–34.5)
MCV RBC AUTO: 90.3 FL (ref 80–99.9)
MONOCYTES ABSOLUTE: 1.41 E9/L (ref 0.1–0.95)
MONOCYTES RELATIVE PERCENT: 10.1 % (ref 2–12)
NEUTROPHILS ABSOLUTE: 9.77 E9/L (ref 1.8–7.3)
NEUTROPHILS RELATIVE PERCENT: 70.3 % (ref 43–80)
PDW BLD-RTO: 13.9 FL (ref 11.5–15)
PLATELET # BLD: 234 E9/L (ref 130–450)
PMV BLD AUTO: 9 FL (ref 7–12)
POTASSIUM REFLEX MAGNESIUM: 3.8 MMOL/L (ref 3.5–5)
RBC # BLD: 5.13 E12/L (ref 3.8–5.8)
SODIUM BLD-SCNC: 138 MMOL/L (ref 132–146)
WBC # BLD: 13.9 E9/L (ref 4.5–11.5)

## 2021-06-12 PROCEDURE — 36415 COLL VENOUS BLD VENIPUNCTURE: CPT

## 2021-06-12 PROCEDURE — G0378 HOSPITAL OBSERVATION PER HR: HCPCS

## 2021-06-12 PROCEDURE — 6370000000 HC RX 637 (ALT 250 FOR IP): Performed by: INTERNAL MEDICINE

## 2021-06-12 PROCEDURE — 93010 ELECTROCARDIOGRAM REPORT: CPT | Performed by: INTERNAL MEDICINE

## 2021-06-12 PROCEDURE — 99226 PR SBSQ OBSERVATION CARE/DAY 35 MINUTES: CPT | Performed by: INTERNAL MEDICINE

## 2021-06-12 PROCEDURE — 85025 COMPLETE CBC W/AUTO DIFF WBC: CPT

## 2021-06-12 PROCEDURE — 2580000003 HC RX 258: Performed by: INTERNAL MEDICINE

## 2021-06-12 PROCEDURE — 80048 BASIC METABOLIC PNL TOTAL CA: CPT

## 2021-06-12 PROCEDURE — 93306 TTE W/DOPPLER COMPLETE: CPT

## 2021-06-12 RX ADMIN — SODIUM CHLORIDE, PRESERVATIVE FREE 10 ML: 5 INJECTION INTRAVENOUS at 20:15

## 2021-06-12 RX ADMIN — APIXABAN 10 MG: 5 TABLET, FILM COATED ORAL at 20:15

## 2021-06-12 RX ADMIN — APIXABAN 10 MG: 5 TABLET, FILM COATED ORAL at 08:40

## 2021-06-12 RX ADMIN — QUETIAPINE FUMARATE 200 MG: 100 TABLET ORAL at 20:14

## 2021-06-12 RX ADMIN — ACETAMINOPHEN 650 MG: 325 TABLET ORAL at 08:40

## 2021-06-12 RX ADMIN — DULOXETINE HYDROCHLORIDE 60 MG: 60 CAPSULE, DELAYED RELEASE ORAL at 08:40

## 2021-06-12 RX ADMIN — ACETAMINOPHEN 650 MG: 325 TABLET ORAL at 20:14

## 2021-06-12 ASSESSMENT — PAIN DESCRIPTION - LOCATION: LOCATION: RIB CAGE

## 2021-06-12 ASSESSMENT — PAIN SCALES - GENERAL
PAINLEVEL_OUTOF10: 2
PAINLEVEL_OUTOF10: 1

## 2021-06-12 ASSESSMENT — PAIN DESCRIPTION - PAIN TYPE: TYPE: ACUTE PAIN

## 2021-06-12 NOTE — PROGRESS NOTES
[facility] Quality Flow/Interdisciplinary Rounds Progress Note        Quality Flow Rounds held on June 12, 2021    Disciplines Attending:  Bedside Nurse, ,  and Nursing Unit Odin was admitted on 6/11/2021  1:50 PM    Anticipated Discharge Date:  Expected Discharge Date: 06/14/21    Disposition:    Jelani Score:  Jelani Scale Score: 22    Readmission Risk              Risk of Unplanned Readmission:  0           Discussed patient goal for the day, patient clinical progression, and barriers to discharge. The following Goal(s) of the Day/Commitment(s) have been identified: Patient receiving Eliquis orally BID, monitor vitals and pulse oximetry, for 2D echo today, discharge planning.       Farzaneh Matute RN  June 12, 2021

## 2021-06-12 NOTE — PROGRESS NOTES
[facility] Progress Note    Admitting Date and Time: 6/11/2021  1:50 PM  Admit Dx: Bilateral pulmonary embolism (HCC) [I26.99]    Subjective:  Patient is being followed for Bilateral pulmonary embolism ([address] [I26.99]   Pt feels somewhat better, still having shortness of breath on minimal exertion, he also reported having right-sided chest pain whether rib fracture is at      [facility] amphetamine-dextroamphetamine  30 mg Oral BID    DULoxetine  60 mg Oral Daily    QUEtiapine  200 mg Oral Nightly    lisinopril  40 mg Oral Daily    sodium chloride flush  10 mL Intravenous 2 times per day    apixaban  10 mg Oral BID    Followed by   Lila Muñoz ON 6/18/2021] apixaban  5 mg Oral BID     sodium chloride flush, 10 mL, PRN  sodium chloride flush, 10 mL, PRN  sodium chloride, 25 mL, PRN  polyethylene glycol, 17 g, Daily PRN  acetaminophen, 650 mg, Q6H PRN   Or  acetaminophen, 650 mg, Q6H PRN  perflutren lipid microspheres, 1.5 mL, ONCE PRN         Objective:    BP (!) 127/91   Pulse 101   Temp 97 °F (36.1 °C)   Resp 16   Ht 6' 3\" (1.905 m)   Wt 278 lb (126.1 kg)   SpO2 93%   BMI 34.75 kg/m²   General Appearance: alert and oriented to person, place and time and in no acute distress  Skin: warm and dry  Head: normocephalic and atraumatic  Eyes: pupils equal, round, and reactive to light, extraocular eye movements intact, conjunctivae normal  Neck: neck supple and non tender without mass   Pulmonary/Chest: clear to auscultation bilaterally- no wheezes, rales or rhonchi, right anterior chest wall tenderness  Cardiovascular: normal rate, normal S1 and S2 and no carotid bruits  Abdomen: soft, non-tender, non-distended, normal bowel sounds, no masses or organomegaly  Extremities: no cyanosis, no clubbing and no edema  Neurologic: no cranial nerve deficit and speech normal      Recent Labs     06/11/21  1426 06/12/21  0405    138   K 4.0 3.8    105   CO2 23 21*   BUN 43* 46*   CREATININE 1.5* 1.3* GLUCOSE 172* 142*   CALCIUM 9.4 8.7       Recent Labs     06/11/21  1426 06/12/21  0405   WBC 16.3* 13.9*   RBC 5.83* 5.13   HGB 17.2* 15.3   HCT 51.8 46.3   MCV 88.9 90.3   MCH 29.5 29.8   MCHC 33.2 33.0   RDW 13.9 13.9    234   MPV 9.0 9.0         Assessment:    Active Problems:    Bilateral pulmonary embolism (HCC)  Resolved Problems:    * No resolved hospital problems. *      Plan:  1. Acute bilateral PEs, provoked due to decreased mobility with right heart strain, started patient on anticoagulation with Eliquis, obtain echo to evaluate right heart function. Ultrasound Doppler lower extremities was negative for DVT  2. Right rib fracture continue with incentive spirometry and Lidoderm patch. 3.  Syncope due to dehydration and orthostasis. Holding diuretics probably patient should not be on diuretics, gentle fluids, repeat orthostatics. 4.  Essential hypertension, continue lisinopril for now, blood pressure acceptable, consider starting Coreg if needed        NOTE: This report was transcribed using voice recognition software. Every effort was made to ensure accuracy; however, inadvertent computerized transcription errors may be present.   Electronically signed by Twan Lizama MD on 6/12/2021 at 9:04 AM

## 2021-06-13 VITALS
SYSTOLIC BLOOD PRESSURE: 121 MMHG | OXYGEN SATURATION: 91 % | TEMPERATURE: 98.2 F | DIASTOLIC BLOOD PRESSURE: 75 MMHG | HEART RATE: 95 BPM | HEIGHT: 75 IN | RESPIRATION RATE: 18 BRPM | WEIGHT: 282.3 LBS | BODY MASS INDEX: 35.1 KG/M2

## 2021-06-13 LAB
ANION GAP SERPL CALCULATED.3IONS-SCNC: 10 MMOL/L (ref 7–16)
BASOPHILS ABSOLUTE: 0.02 E9/L (ref 0–0.2)
BASOPHILS RELATIVE PERCENT: 0.2 % (ref 0–2)
BUN BLDV-MCNC: 34 MG/DL (ref 6–20)
CALCIUM SERPL-MCNC: 8.8 MG/DL (ref 8.6–10.2)
CHLORIDE BLD-SCNC: 107 MMOL/L (ref 98–107)
CO2: 22 MMOL/L (ref 22–29)
CREAT SERPL-MCNC: 1.1 MG/DL (ref 0.7–1.2)
EOSINOPHILS ABSOLUTE: 0.4 E9/L (ref 0.05–0.5)
EOSINOPHILS RELATIVE PERCENT: 4 % (ref 0–6)
GFR AFRICAN AMERICAN: >60
GFR NON-AFRICAN AMERICAN: >60 ML/MIN/1.73
GLUCOSE BLD-MCNC: 127 MG/DL (ref 74–99)
HCT VFR BLD CALC: 46.7 % (ref 37–54)
HEMOGLOBIN: 15.3 G/DL (ref 12.5–16.5)
IMMATURE GRANULOCYTES #: 0.09 E9/L
IMMATURE GRANULOCYTES %: 0.9 % (ref 0–5)
LYMPHOCYTES ABSOLUTE: 2.24 E9/L (ref 1.5–4)
LYMPHOCYTES RELATIVE PERCENT: 22.4 % (ref 20–42)
MAGNESIUM: 2.3 MG/DL (ref 1.6–2.6)
MCH RBC QN AUTO: 29.5 PG (ref 26–35)
MCHC RBC AUTO-ENTMCNC: 32.8 % (ref 32–34.5)
MCV RBC AUTO: 90 FL (ref 80–99.9)
MONOCYTES ABSOLUTE: 0.95 E9/L (ref 0.1–0.95)
MONOCYTES RELATIVE PERCENT: 9.5 % (ref 2–12)
NEUTROPHILS ABSOLUTE: 6.32 E9/L (ref 1.8–7.3)
NEUTROPHILS RELATIVE PERCENT: 63 % (ref 43–80)
PDW BLD-RTO: 13.8 FL (ref 11.5–15)
PLATELET # BLD: 229 E9/L (ref 130–450)
PMV BLD AUTO: 9.3 FL (ref 7–12)
POTASSIUM REFLEX MAGNESIUM: 3.5 MMOL/L (ref 3.5–5)
RBC # BLD: 5.19 E12/L (ref 3.8–5.8)
SODIUM BLD-SCNC: 139 MMOL/L (ref 132–146)
WBC # BLD: 10 E9/L (ref 4.5–11.5)

## 2021-06-13 PROCEDURE — 83735 ASSAY OF MAGNESIUM: CPT

## 2021-06-13 PROCEDURE — G0378 HOSPITAL OBSERVATION PER HR: HCPCS

## 2021-06-13 PROCEDURE — 36415 COLL VENOUS BLD VENIPUNCTURE: CPT

## 2021-06-13 PROCEDURE — 6370000000 HC RX 637 (ALT 250 FOR IP): Performed by: INTERNAL MEDICINE

## 2021-06-13 PROCEDURE — 85025 COMPLETE CBC W/AUTO DIFF WBC: CPT

## 2021-06-13 PROCEDURE — 2580000003 HC RX 258: Performed by: INTERNAL MEDICINE

## 2021-06-13 PROCEDURE — 99217 PR OBSERVATION CARE DISCHARGE MANAGEMENT: CPT | Performed by: INTERNAL MEDICINE

## 2021-06-13 PROCEDURE — 80048 BASIC METABOLIC PNL TOTAL CA: CPT

## 2021-06-13 RX ORDER — LIDOCAINE 4 G/G
1 PATCH TOPICAL DAILY
Status: DISCONTINUED | OUTPATIENT
Start: 2021-06-13 | End: 2021-06-13 | Stop reason: HOSPADM

## 2021-06-13 RX ORDER — HYDROCODONE BITARTRATE AND ACETAMINOPHEN 5; 325 MG/1; MG/1
1 TABLET ORAL EVERY 6 HOURS PRN
Status: DISCONTINUED | OUTPATIENT
Start: 2021-06-13 | End: 2021-06-13 | Stop reason: HOSPADM

## 2021-06-13 RX ORDER — HYDROCODONE BITARTRATE AND ACETAMINOPHEN 5; 325 MG/1; MG/1
1 TABLET ORAL EVERY 6 HOURS PRN
Qty: 20 TABLET | Refills: 0 | Status: SHIPPED | OUTPATIENT
Start: 2021-06-13 | End: 2021-06-18

## 2021-06-13 RX ADMIN — SODIUM CHLORIDE, PRESERVATIVE FREE 10 ML: 5 INJECTION INTRAVENOUS at 09:19

## 2021-06-13 RX ADMIN — APIXABAN 10 MG: 5 TABLET, FILM COATED ORAL at 09:17

## 2021-06-13 RX ADMIN — HYDROCODONE BITARTRATE AND ACETAMINOPHEN 1 TABLET: 5; 325 TABLET ORAL at 12:50

## 2021-06-13 RX ADMIN — LISINOPRIL 40 MG: 20 TABLET ORAL at 09:18

## 2021-06-13 RX ADMIN — DULOXETINE HYDROCHLORIDE 60 MG: 60 CAPSULE, DELAYED RELEASE ORAL at 09:18

## 2021-06-13 ASSESSMENT — PAIN SCALES - GENERAL: PAINLEVEL_OUTOF10: 9

## 2021-06-13 NOTE — DISCHARGE SUMMARY
[facility]       No follow-up provider specified. Activity level: As tolerated     Dispo: Home      Condition on discharge: Stable     Patient ID:  Kailee Barreto  [id number]  62 y.o.  1965    Admit date: 6/11/2021    Discharge date and time:  6/13/2021  3:16 PM    Admission Diagnoses: Active Problems:    Bilateral pulmonary embolism (HCC)  Resolved Problems:    * No resolved hospital problems. *      Discharge Diagnoses: Active Problems:    Bilateral pulmonary embolism (HCC)  Resolved Problems:    * No resolved hospital problems. *      Consults:  None    Procedures:   Echo:   Ejection fraction is visually estimated at 60%. No regional wall motion abnormalities seen. Mild left ventricular concentric hypertrophy noted. Normal right ventricle structure and function. Physiologic and/or trace mitral regurgitation is present. Physiologic and/or trace tricuspid regurgitation. Hospital Course:   Patient Kailee Barreto is a 61 y.o. presented with shortness of breath, found to have Bilateral pulmonary embolism ([address] [I26.99]     1. Acute bilateral PEs, provoked due to decreased mobility with right heart strain, started patient on anticoagulation with Eliquis, obtain echo to evaluate right heart function. Ultrasound Doppler lower extremities was negative for DVT  2. Right rib fracture continue with incentive spirometry and will discharge the patient on norco  3. Syncope due to dehydration and orthostasis. Holding diuretics probably patient should not be on diuretics, gentle fluids, repeat orthostatics were negative  4. Essential hypertension, continue lisinopril for now, blood pressure acceptable, off BP meds, discharge off meds.     Discharge Exam:    General Appearance: alert and oriented to person, place and time and in no acute distress  Skin: warm and dry  Head: normocephalic and atraumatic  Eyes: pupils equal, round, and reactive to light, Kolleen Gaucher DO                                Any Other  Procedure Type of Study   TTE procedure:Echo Complete W/Doppler & Color Flow. Procedure Date Date: 06/12/2021 Start: 07:00 AM Study Location: Portable Technical Quality: Adequate visualization Indications:Pulmonary embolus. Patient Status: Routine Height: 75 inches Weight: 278 pounds BSA: 2.52 m^2 BMI: 34.75 kg/m^2 HR: 94 bpm BP: 128/90 mmHg  Findings   Left Ventricle  Ejection fraction is visually estimated at 60%. No regional wall motion  abnormalities seen. Mild left ventricular concentric hypertrophy noted. Left ventricle size is normal. Normal left ventricular diastolic filling  pattern for age. Right Ventricle  Normal right ventricle structure and function. TAPSE is 1.7 cm. Left Atrium  Left atrial volume index of 22 ml per meters squared BSA. Right Atrium  Normal right atrium. Mitral Valve  Mild mitral annular calcification. No evidence of mitral valve stenosis. Physiologic and/or trace mitral regurgitation is present. Tricuspid Valve  The tricuspid valve appears structurally normal.  Physiologic and/or trace tricuspid regurgitation. Aortic Valve  The aortic valve is trileaflet. No hemodynamically significant aortic  stenosis is present. No evidence of aortic valve regurgitation. Pulmonic Valve  Pulmonic valve is structurally normal. Physiologic and/or trace pulmonic  regurgitation present. Pericardial Effusion  No evidence for hemodynamically significant pericardial effusion. Aorta  Normal aortic root and ascending aorta. Miscellaneous  The inferior vena cava diameter is normal with normal respiratory  variation. Conclusions   Summary  Ejection fraction is visually estimated at 60%. No regional wall motion abnormalities seen. Mild left ventricular concentric hypertrophy noted. Normal right ventricle structure and function. Physiologic and/or trace mitral regurgitation is present.   Physiologic and/or trace tricuspid regurgitation.    Signature   ----------------------------------------------------------------  Electronically signed by James WISDOMInterpreting  physician) on 06/12/2021 03:36 PM  ----------------------------------------------------------------  M-Mode/2D Measurements & Calculations   LV Diastolic    LV Systolic Dimension: 2.6   AV Cusp Separation: 2 cmLA  Dimension: 3.7  cm                           Dimension: 3 cmAO Root  cm              LV Volume Diastolic: 32.9 ml Dimension: 3.7 cm  LV FS:29.7 %    LV Volume Systolic: 67.6 ml  LV PW           LV EDV/LV EDV Index: 99.3  Diastolic: 1 cm VM/06 NS/I^5FW ESV/LV ESV  LV PW Systolic: Index: 90.0 KY/3CN/ m^2      RV Diastolic Dimension: 4.7  1.5 cm          EF Calculated: 58.5 %        cm  Septum          LV Mass Index: 51 l/min*m^2  Diastolic: 1.2                               LA/Aorta: 0.81  cm                                           Ascending Aorta: 3.3 cm  Septum          LVOT: 2 cm                   LA volume/Index: 55 ml  Systolic: 1.5                                /21.6ml/m^2  cm                                           RA Area: 13.9 cm^2  CO: 6.29 l/min  CI: 2.5 l/m*m^2                              IVC Expiration: 1.8 cm  LV Mass: 129.33  g  Doppler Measurements & Calculations   MV Peak E-Wave: 0.33 AV Peak Velocity:     LVOT Peak Velocity: 1.08 m/s  m/s                  1.35 m/s              LVOT Mean Velocity: 0.76 m/s  MV Peak A-Wave: 0.69 AV Peak Gradient:     LVOT Peak Gradient: 4.6  m/s                  7.25 mmHg             mmHgLVOT Mean Gradient: 2.6  MV E/A Ratio: 0.47   AV Mean Velocity:     mmHg  MV Peak Gradient:    0.94 m/s              Estimated RVSP: 27 mmHg  2.7 mmHg             AV Mean Gradient: 4.1 Estimated RAP:3 mmHg  MV Mean Gradient:    mmHg  0.9 mmHg             AV VTI: 21.4 cm  MV Mean Velocity:    AV Area               TR Velocity:2.45 m/s  0.44 m/s             (Continuity):3.13     TR Gradient:24.01 mmHg  MV Deceleration      cm^2                  PV Peak Velocity: 0.54 m/s  Time: 181.9 msec                           PV Peak Gradient: 1.15 mmHg  MV P1/2t: 47 msec    LVOT VTI: 21.3 cm     PV Mean Velocity: 0.36 m/s  MVA by PHT:4.68 cm^2                       PV Mean Gradient: 0.6 mmHg  MV Area              Estimated PASP: 27.01  (continuity): 4.4    mmHg  cm^2  MV E' Septal  Velocity: 0.07 m/s  MV E' Lateral  Velocity: 10 m/s  [id number]    CT CHEST WO CONTRAST    Result Date: 6/11/2021  EXAMINATION: CT OF THE CHEST WITHOUT CONTRAST 6/11/2021 2:51 pm TECHNIQUE: CT of the chest was performed without the administration of intravenous contrast. Multiplanar reformatted images are provided for review. Dose modulation, iterative reconstruction, and/or weight based adjustment of the mA/kV was utilized to reduce the radiation dose to as low as reasonably achievable. Dose is total DLP by 53.32 M Gy cm. COMPARISON: There is an acute displaced slightly depressed and slightly overriding fracture of the lateral right 6th rib with moderate overlying pleural thickening. HISTORY: ORDERING SYSTEM PROVIDED HISTORY: shortness of breath, cough, recent fall on left side TECHNOLOGIST PROVIDED HISTORY: Reason for exam:->shortness of breath, cough, recent fall on left side Decision Support Exception - unselect if not a suspected or confirmed emergency medical condition->Emergency Medical Condition (MA) FINDINGS: Mediastinum: Heart size normal.  No aortic aneurysm. Normal size of the thoracic aorta and pulmonary arteries. No pericardial effusion. No mediastinal hilar lymphadenopathy. Normal thyroid. Normal esophagus, no hiatal hernia. Lungs/pleura: No infiltrate. No evidence of lung contusion. No effusion or pneumothorax. Adequate lung volumes. No emphysematous or fibrotic changes.  Upper Abdomen: Cholecystectomy clips.  Mild fatty change in the liver. No obvious liver masses or bile duct dilatation. The common bile duct measures 2 mm. Pancreas and spleen are not enlarged. Adrenal glands are not enlarged. The right kidney has a 13 mm poorly seen round low-density structure in the lateral mid pole cortex and a 2.6 cm poorly seen round low-density structure in the posterior lower pole cortex. A 4 mm nonobstructing stone in the anterior right lower pole. The left kidney has a 1.8 cm round low-density structure in the superolateral margin. Adjacent exophytic hyperdense nodule 1.3 cm. There is a 3 mm hyperdense nodule in the lateral upper pole. A poorly seen 2.3 cm round low-density structure in the posterior mid left kidney with 2 peripheral calcified nodules. A 5 mm probable cyst in the midpole cortex. The lower poles of the kidneys are not included. No evidence for a collecting system stone on the left. No hydronephrosis. No ascites. Soft Tissues/Bones: An acute displaced, slightly depressed and slightly overriding fracture of the lateral right 6th rib with moderate overlying pleural thickening. No other rib fractures. No pneumothorax or effusion. Left ribs are intact. A mild levoconvex upper thoracic curvature. Moderate spurring in the mid and lower thoracic spine. No fracture of the spine. No compression deformity. Right lateral spurs do not involve the rib articulations. Left lateral spur at T6/7 and T7/8 with some associated spurring on the head of the rib. 1. Acute slightly depressed fracture lateral right 6th rib with moderate pleural thickening. 2. No pneumothorax or effusion. 3. No lung infiltrates. 4. Bilateral renal cystic lesions. A nonobstructing stone right kidney and a small hyperdense nodule left kidney. These could be followed up with ultrasound.      CTA PULMONARY W CONTRAST    Result Date: 6/11/2021  EXAMINATION: CTA OF THE CHEST 6/11/2021 4:49 pm TECHNIQUE: CTA of the chest was performed after the administration of intravenous contrast.  Multiplanar reformatted images are provided for review. MIP images are provided for review. Dose modulation, iterative reconstruction, and/or weight based adjustment of the mA/kV was utilized to reduce the radiation dose to as low as reasonably achievable. Dose is total .15 M Gy cm. Contrast is 100 mL Isovue-300 IV. COMPARISON: CT chest without contrast 06/11/2021. At 1454 hours. HISTORY: ORDERING SYSTEM PROVIDED HISTORY: eval for PE TECHNOLOGIST PROVIDED HISTORY: Reason for exam:->eval for PE Decision Support Exception - unselect if not a suspected or confirmed emergency medical condition->Emergency Medical Condition (MA) FINDINGS: Pulmonary Arteries: Positive for moderate bilateral pulmonary emboli. Clot is at bilateral amelia. Large amount of clot extends into the right upper, middle and lower lobe branches. Moderate amount of clot at the left hilum branching into upper, lingular and lower lobe branches. Most of the clot is incompletely occluding into the reaches the smaller vessels. No saddle embolism. There may be mild RV strain, slightly dilated right ventricle. Main pulmonary arteries are minimally prominent. Overall heart size is within normal and the aorta is not dilated. No aortic aneurysm or dissection. No pericardial effusion. Normal esophagus, no hiatal hernia. Normal thyroid. A few tiny right paratracheal lymph nodes. No axillary adenopathy. Mediastinum: No evidence of mediastinal lymphadenopathy. The heart and pericardium demonstrate no acute abnormality. There is no acute abnormality of the thoracic aorta. Lungs/pleura: No acute lung infiltrates. No significant atelectasis. No effusion or pneumothorax. Again noted is right 6th rib fracture with moderate pleural thickening. Upper Abdomen: See the prior CT chest report for details on CT abdomen. Bilateral renal cysts.   Small nodule projecting off the superolateral left kidney does not show significant enhancement and is likely a proteinaceous cyst.  Cholecystectomy clips. Soft Tissues/Bones: Bones: Fracture right lateral 6th rib. Chronic thoracic spurs. No compression fracture. 1. Positive for moderate bilateral pulmonary emboli. 2. Mild RV strain. 3. No lung infiltrates or effusions. 4. Slightly depressed lateral right 6th rib fracture. 5. Report called to Dr. Yong Medrano in the emergency room at 1710 hours 06/11/2021. US DUP LOWER EXTREMITIES BILATERAL VENOUS    Result Date: 6/11/2021  EXAMINATION: DUPLEX VENOUS ULTRASOUND OF THE BILATERAL LOWER EXTREMITIES, 6/11/2021 6:46 pm TECHNIQUE: Duplex ultrasound using B-mode/gray scaled imaging and Doppler spectral analysis and color flow was obtained of the bilateral lower extremities. COMPARISON: None. HISTORY: ORDERING SYSTEM PROVIDED HISTORY: r/o dvt TECHNOLOGIST PROVIDED HISTORY: Reason for exam:->r/o dvt What reading provider will be dictating this exam?->CRC Acute onset short of breath, distress, CT positive for bilateral PE. FINDINGS: The visualized veins of the bilateral lower extremities are patent and free of echogenic thrombus. The veins demonstrate good compressibility with normal color flow study and spectral analysis. There is currently no evidence for deep venous thrombosis right or left leg. Good color flow with augmentation and compression at the common femoral vein, superficial femoral vein, popliteal vein. Flow is documented in all 3 major calf veins each leg. No evidence of DVT in either lower extremity.        Patient Instructions:      Medication List      START taking these medications    * apixaban 5 MG Tabs tablet  Commonly known as: Eliquis  Take 2 tablets by mouth 2 times daily for 5 days     * apixaban 5 MG Tabs tablet  Commonly known as: Eliquis  Take 1 tablet by mouth 2 times daily     HYDROcodone-acetaminophen 5-325 MG per tablet  Commonly known as: NORCO  Take 1 tablet by mouth every 6 hours as needed for Pain for up to 5 days. * This list has 2 medication(s) that are the same as other medications prescribed for you. Read the directions carefully, and ask your doctor or other care provider to review them with you.             CONTINUE taking these medications    amphetamine-dextroamphetamine 30 MG tablet  Commonly known as: ADDERALL     DULoxetine 60 MG extended release capsule  Commonly known as: CYMBALTA     lisinopril 10 MG tablet  Commonly known as: PRINIVIL;ZESTRIL     QUEtiapine 200 MG tablet  Commonly known as: SEROQUEL        STOP taking these medications    busPIRone 10 MG tablet  Commonly known as: BUSPAR     triamterene-hydroCHLOROthiazide 37.5-25 MG per tablet  Commonly known as: MAXZIDE-25           Where to Get Your Medications      These medications were sent to [place] [address] [phone] [address] [address]    Phone: [phone]   · apixaban 5 MG Tabs tablet  · apixaban 5 MG Tabs tablet  · HYDROcodone-acetaminophen 5-325 MG per tablet           Note that more than 30 minutes was spent in preparing discharge papers, discussing discharge with patient, medication review, etc.    Signed:  Electronically signed by Brinda Awan MD on 6/13/2021 at 3:16 PM

## 2021-06-13 NOTE — PLAN OF CARE
Problem: Gas Exchange - Impaired:  Goal: Levels of oxygenation will improve  Description: Levels of oxygenation will improve  Outcome: Met This Shift     Problem: Pain:  Goal: Pain level will decrease  Description: Pain level will decrease  Outcome: Met This Shift  Goal: Control of acute pain  Description: Control of acute pain  Outcome: Met This Shift  Goal: Control of chronic pain  Description: Control of chronic pain  Outcome: Met This Shift

## 2021-06-29 ENCOUNTER — TELEPHONE (OUTPATIENT)
Dept: PRIMARY CARE CLINIC | Age: 56
End: 2021-06-29

## 2021-06-29 NOTE — TELEPHONE ENCOUNTER
Good afternoon,  Pt would like to est care with you. Pt is not taking any controlled substances. Would you be willing to accept NP at this time? Thank you.

## 2021-07-01 NOTE — TELEPHONE ENCOUNTER
I can accept him as a new patient but please let him know that I did not prescribe antipsychotic medication, Adderall, tramadol or narcotic medications

## 2022-11-08 ENCOUNTER — HOSPITAL ENCOUNTER (EMERGENCY)
Age: 57
Discharge: HOME | End: 2022-11-08
Payer: COMMERCIAL

## 2022-11-08 VITALS
WEIGHT: 225 LBS | DIASTOLIC BLOOD PRESSURE: 81 MMHG | SYSTOLIC BLOOD PRESSURE: 118 MMHG | HEIGHT: 75 IN | RESPIRATION RATE: 18 BRPM | HEART RATE: 113 BPM | BODY MASS INDEX: 27.98 KG/M2 | OXYGEN SATURATION: 98 %

## 2022-11-08 PROCEDURE — 99281 EMR DPT VST MAYX REQ PHY/QHP: CPT

## 2022-11-08 ASSESSMENT — PAIN SCALES - GENERAL: PAINLEVEL_OUTOF10: 9

## 2022-11-08 ASSESSMENT — PAIN - FUNCTIONAL ASSESSMENT: PAIN_FUNCTIONAL_ASSESSMENT: 0-10

## 2022-11-08 ASSESSMENT — LIFESTYLE VARIABLES
HOW OFTEN DO YOU HAVE A DRINK CONTAINING ALCOHOL: NEVER
HOW MANY STANDARD DRINKS CONTAINING ALCOHOL DO YOU HAVE ON A TYPICAL DAY: PATIENT DOES NOT DRINK

## 2022-11-08 NOTE — ED NOTES
Department of Emergency Medicine  FIRST PROVIDER TRIAGE NOTE             Independent MLP           11/8/22  4:08 PM EST    Date of Encounter: 11/8/22   MRN: [id number]      HPI: Noah Moraes is a 61 y.o. male who presents to the ED for Flank Pain (Left flank pain beginning Sunday morning. Denies fever, chills)     Pt presenting with left flank pain x 3 days. Denies associated sxs. ROS: Negative for cp or sob. PE: Gen Appearance/Constitutional: alert  HEENT: NC/NT. PERRLA,  Airway patent. Initial Plan of Care: All treatment areas with department are currently occupied. Plan to order/Initiate the following while awaiting opening in ED: labs and imaging studies.   Initiate Treatment-Testing, Proceed toTreatment Area When Bed Available for ED Attending/MLP to Continue Care    Electronically signed by Doug Gibbs PA-C   DD: 11/8/22      Doug Gibbs PA-C  11/08/22 [id number]

## 2022-11-16 ENCOUNTER — APPOINTMENT (OUTPATIENT)
Dept: CT IMAGING | Age: 57
DRG: 698 | End: 2022-11-16
Payer: MEDICARE

## 2022-11-16 ENCOUNTER — HOSPITAL ENCOUNTER (INPATIENT)
Age: 57
LOS: 3 days | Discharge: HOME | DRG: 698 | End: 2022-11-19
Attending: EMERGENCY MEDICINE | Admitting: FAMILY MEDICINE
Payer: MEDICARE

## 2022-11-16 ENCOUNTER — APPOINTMENT (OUTPATIENT)
Dept: GENERAL RADIOLOGY | Age: 57
DRG: 698 | End: 2022-11-16
Payer: MEDICARE

## 2022-11-16 DIAGNOSIS — Z96.0 URETERAL STENT PRESENT: ICD-10-CM

## 2022-11-16 DIAGNOSIS — E78.3 HYPERCHYLOMICRONEMIA: ICD-10-CM

## 2022-11-16 DIAGNOSIS — M89.9 DISORDER OF BONE: ICD-10-CM

## 2022-11-16 DIAGNOSIS — E83.42 HYPOMAGNESEMIA: ICD-10-CM

## 2022-11-16 DIAGNOSIS — A41.9 SEPSIS, DUE TO UNSPECIFIED ORGANISM, UNSPECIFIED WHETHER ACUTE ORGAN DYSFUNCTION PRESENT (HCC): Primary | ICD-10-CM

## 2022-11-16 DIAGNOSIS — E87.6 HYPOKALEMIA: ICD-10-CM

## 2022-11-16 PROBLEM — R65.21 SEPTIC SHOCK (HCC): Status: ACTIVE | Noted: 2022-11-16

## 2022-11-16 LAB
ALBUMIN SERPL-MCNC: 2.9 G/DL (ref 3.5–5.2)
ALP BLD-CCNC: 100 U/L (ref 40–129)
ALT SERPL-CCNC: 66 U/L (ref 0–40)
ANION GAP SERPL CALCULATED.3IONS-SCNC: 14 MMOL/L (ref 7–16)
ANION GAP SERPL CALCULATED.3IONS-SCNC: 8 MMOL/L (ref 7–16)
AST SERPL-CCNC: 112 U/L (ref 0–39)
BACTERIA: ABNORMAL /HPF
BASOPHILS ABSOLUTE: 0 E9/L (ref 0–0.2)
BASOPHILS RELATIVE PERCENT: 0 % (ref 0–2)
BILIRUB SERPL-MCNC: 1.4 MG/DL (ref 0–1.2)
BILIRUBIN DIRECT: 1 MG/DL (ref 0–0.3)
BILIRUBIN URINE: ABNORMAL
BILIRUBIN, INDIRECT: 0.4 MG/DL (ref 0–1)
BLOOD, URINE: ABNORMAL
BUN BLDV-MCNC: 24 MG/DL (ref 6–20)
BUN BLDV-MCNC: 26 MG/DL (ref 6–20)
BURR CELLS: ABNORMAL
CALCIUM SERPL-MCNC: 7.7 MG/DL (ref 8.6–10.2)
CALCIUM SERPL-MCNC: 8.4 MG/DL (ref 8.6–10.2)
CHLORIDE BLD-SCNC: 104 MMOL/L (ref 98–107)
CHLORIDE BLD-SCNC: 95 MMOL/L (ref 98–107)
CLARITY: CLEAR
CO2: 24 MMOL/L (ref 22–29)
CO2: 25 MMOL/L (ref 22–29)
COLOR: YELLOW
CREAT SERPL-MCNC: 1.5 MG/DL (ref 0.7–1.2)
CREAT SERPL-MCNC: 1.9 MG/DL (ref 0.7–1.2)
EOSINOPHILS ABSOLUTE: 0 E9/L (ref 0.05–0.5)
EOSINOPHILS RELATIVE PERCENT: 0 % (ref 0–6)
GFR SERPL CREATININE-BSD FRML MDRD: 40 ML/MIN/1.73
GFR SERPL CREATININE-BSD FRML MDRD: 53 ML/MIN/1.73
GLUCOSE BLD-MCNC: 121 MG/DL (ref 74–99)
GLUCOSE BLD-MCNC: 122 MG/DL (ref 74–99)
GLUCOSE URINE: NEGATIVE MG/DL
HCT VFR BLD CALC: 37.1 % (ref 37–54)
HEMOGLOBIN: 12.4 G/DL (ref 12.5–16.5)
KETONES, URINE: ABNORMAL MG/DL
LACTIC ACID: 1.4 MMOL/L (ref 0.5–2.2)
LACTIC ACID: 2.3 MMOL/L (ref 0.5–2.2)
LEUKOCYTE ESTERASE, URINE: ABNORMAL
LIPASE: 21 U/L (ref 13–60)
LYMPHOCYTES ABSOLUTE: 0.4 E9/L (ref 1.5–4)
LYMPHOCYTES RELATIVE PERCENT: 1.7 % (ref 20–42)
MAGNESIUM: 1.3 MG/DL (ref 1.6–2.6)
MAGNESIUM: 2.3 MG/DL (ref 1.6–2.6)
MCH RBC QN AUTO: 30.2 PG (ref 26–35)
MCHC RBC AUTO-ENTMCNC: 33.4 % (ref 32–34.5)
MCV RBC AUTO: 90.3 FL (ref 80–99.9)
METER GLUCOSE: 124 MG/DL (ref 74–99)
MONOCYTES ABSOLUTE: 0.8 E9/L (ref 0.1–0.95)
MONOCYTES RELATIVE PERCENT: 3.5 % (ref 2–12)
MUCUS: PRESENT /LPF
NEUTROPHILS ABSOLUTE: 19 E9/L (ref 1.8–7.3)
NEUTROPHILS RELATIVE PERCENT: 94.8 % (ref 43–80)
NITRITE, URINE: POSITIVE
NUCLEATED RED BLOOD CELLS: 0 /100 WBC
PDW BLD-RTO: 13.5 FL (ref 11.5–15)
PH UA: 6 (ref 5–9)
PHOSPHORUS: 4.1 MG/DL (ref 2.5–4.5)
PLATELET # BLD: 215 E9/L (ref 130–450)
PMV BLD AUTO: 9.3 FL (ref 7–12)
POIKILOCYTES: ABNORMAL
POLYCHROMASIA: ABNORMAL
POTASSIUM SERPL-SCNC: 2.9 MMOL/L (ref 3.5–5)
POTASSIUM SERPL-SCNC: 3.7 MMOL/L (ref 3.5–5)
PROCALCITONIN: 24.81 NG/ML (ref 0–0.08)
PROTEIN UA: 100 MG/DL
RBC # BLD: 4.11 E12/L (ref 3.8–5.8)
RBC UA: >20 /HPF (ref 0–2)
SARS-COV-2, NAAT: NOT DETECTED
SODIUM BLD-SCNC: 134 MMOL/L (ref 132–146)
SODIUM BLD-SCNC: 136 MMOL/L (ref 132–146)
SPECIFIC GRAVITY UA: 1.02 (ref 1–1.03)
TOTAL PROTEIN: 6 G/DL (ref 6.4–8.3)
TROPONIN, HIGH SENSITIVITY: 18 NG/L (ref 0–11)
TROPONIN, HIGH SENSITIVITY: 23 NG/L (ref 0–11)
TSH SERPL DL<=0.05 MIU/L-ACNC: 2.42 UIU/ML (ref 0.27–4.2)
UROBILINOGEN, URINE: 0.2 E.U./DL
WBC # BLD: 20 E9/L (ref 4.5–11.5)
WBC UA: ABNORMAL /HPF (ref 0–5)

## 2022-11-16 PROCEDURE — 99223 1ST HOSP IP/OBS HIGH 75: CPT | Performed by: FAMILY MEDICINE

## 2022-11-16 PROCEDURE — 96365 THER/PROPH/DIAG IV INF INIT: CPT

## 2022-11-16 PROCEDURE — 87081 CULTURE SCREEN ONLY: CPT

## 2022-11-16 PROCEDURE — 82962 GLUCOSE BLOOD TEST: CPT

## 2022-11-16 PROCEDURE — 6370000000 HC RX 637 (ALT 250 FOR IP)

## 2022-11-16 PROCEDURE — 96361 HYDRATE IV INFUSION ADD-ON: CPT

## 2022-11-16 PROCEDURE — 99285 EMERGENCY DEPT VISIT HI MDM: CPT

## 2022-11-16 PROCEDURE — 2580000003 HC RX 258: Performed by: STUDENT IN AN ORGANIZED HEALTH CARE EDUCATION/TRAINING PROGRAM

## 2022-11-16 PROCEDURE — 84484 ASSAY OF TROPONIN QUANT: CPT

## 2022-11-16 PROCEDURE — 36592 COLLECT BLOOD FROM PICC: CPT

## 2022-11-16 PROCEDURE — 83735 ASSAY OF MAGNESIUM: CPT

## 2022-11-16 PROCEDURE — 71045 X-RAY EXAM CHEST 1 VIEW: CPT

## 2022-11-16 PROCEDURE — 2580000003 HC RX 258: Performed by: INTERNAL MEDICINE

## 2022-11-16 PROCEDURE — 84145 PROCALCITONIN (PCT): CPT

## 2022-11-16 PROCEDURE — 80076 HEPATIC FUNCTION PANEL: CPT

## 2022-11-16 PROCEDURE — 93005 ELECTROCARDIOGRAM TRACING: CPT | Performed by: EMERGENCY MEDICINE

## 2022-11-16 PROCEDURE — 84100 ASSAY OF PHOSPHORUS: CPT

## 2022-11-16 PROCEDURE — 81001 URINALYSIS AUTO W/SCOPE: CPT

## 2022-11-16 PROCEDURE — 87635 SARS-COV-2 COVID-19 AMP PRB: CPT

## 2022-11-16 PROCEDURE — 74176 CT ABD & PELVIS W/O CONTRAST: CPT

## 2022-11-16 PROCEDURE — 84443 ASSAY THYROID STIM HORMONE: CPT

## 2022-11-16 PROCEDURE — 87088 URINE BACTERIA CULTURE: CPT

## 2022-11-16 PROCEDURE — 6360000002 HC RX W HCPCS

## 2022-11-16 PROCEDURE — 87077 CULTURE AEROBIC IDENTIFY: CPT

## 2022-11-16 PROCEDURE — 36415 COLL VENOUS BLD VENIPUNCTURE: CPT

## 2022-11-16 PROCEDURE — 87186 SC STD MICRODIL/AGAR DIL: CPT

## 2022-11-16 PROCEDURE — 2500000003 HC RX 250 WO HCPCS: Performed by: STUDENT IN AN ORGANIZED HEALTH CARE EDUCATION/TRAINING PROGRAM

## 2022-11-16 PROCEDURE — 6360000002 HC RX W HCPCS: Performed by: NURSE PRACTITIONER

## 2022-11-16 PROCEDURE — 6360000002 HC RX W HCPCS: Performed by: INTERNAL MEDICINE

## 2022-11-16 PROCEDURE — 2580000003 HC RX 258: Performed by: EMERGENCY MEDICINE

## 2022-11-16 PROCEDURE — 2000000000 HC ICU R&B

## 2022-11-16 PROCEDURE — 2580000003 HC RX 258

## 2022-11-16 PROCEDURE — 96367 TX/PROPH/DG ADDL SEQ IV INF: CPT

## 2022-11-16 PROCEDURE — 80048 BASIC METABOLIC PNL TOTAL CA: CPT

## 2022-11-16 PROCEDURE — 87040 BLOOD CULTURE FOR BACTERIA: CPT

## 2022-11-16 PROCEDURE — 2580000003 HC RX 258: Performed by: FAMILY MEDICINE

## 2022-11-16 PROCEDURE — 85025 COMPLETE CBC W/AUTO DIFF WBC: CPT

## 2022-11-16 PROCEDURE — 96360 HYDRATION IV INFUSION INIT: CPT

## 2022-11-16 PROCEDURE — 83690 ASSAY OF LIPASE: CPT

## 2022-11-16 PROCEDURE — 2700000000 HC OXYGEN THERAPY PER DAY

## 2022-11-16 PROCEDURE — 6360000002 HC RX W HCPCS: Performed by: EMERGENCY MEDICINE

## 2022-11-16 PROCEDURE — 6360000002 HC RX W HCPCS: Performed by: STUDENT IN AN ORGANIZED HEALTH CARE EDUCATION/TRAINING PROGRAM

## 2022-11-16 PROCEDURE — 83605 ASSAY OF LACTIC ACID: CPT

## 2022-11-16 PROCEDURE — 36556 INSERT NON-TUNNEL CV CATH: CPT

## 2022-11-16 RX ORDER — POTASSIUM CHLORIDE 29.8 MG/ML
20 INJECTION INTRAVENOUS
Status: COMPLETED | OUTPATIENT
Start: 2022-11-16 | End: 2022-11-16

## 2022-11-16 RX ORDER — 0.9 % SODIUM CHLORIDE 0.9 %
1000 INTRAVENOUS SOLUTION INTRAVENOUS ONCE
Status: COMPLETED | OUTPATIENT
Start: 2022-11-16 | End: 2022-11-16

## 2022-11-16 RX ORDER — ACETAMINOPHEN 325 MG/1
650 TABLET ORAL EVERY 6 HOURS PRN
Status: DISCONTINUED | OUTPATIENT
Start: 2022-11-16 | End: 2022-11-19 | Stop reason: HOSPADM

## 2022-11-16 RX ORDER — POLYETHYLENE GLYCOL 3350 17 G/17G
17 POWDER, FOR SOLUTION ORAL DAILY PRN
Status: DISCONTINUED | OUTPATIENT
Start: 2022-11-16 | End: 2022-11-19 | Stop reason: HOSPADM

## 2022-11-16 RX ORDER — ENOXAPARIN SODIUM 100 MG/ML
30 INJECTION SUBCUTANEOUS DAILY
Status: DISCONTINUED | OUTPATIENT
Start: 2022-11-16 | End: 2022-11-17

## 2022-11-16 RX ORDER — OXYBUTYNIN CHLORIDE 10 MG/1
10 TABLET, EXTENDED RELEASE ORAL DAILY PRN
COMMUNITY

## 2022-11-16 RX ORDER — ACETAMINOPHEN 325 MG/1
650 TABLET ORAL EVERY 6 HOURS PRN
Status: DISCONTINUED | OUTPATIENT
Start: 2022-11-16 | End: 2022-11-16

## 2022-11-16 RX ORDER — CLONAZEPAM 0.5 MG/1
0.5 TABLET ORAL 2 TIMES DAILY PRN
COMMUNITY

## 2022-11-16 RX ORDER — POTASSIUM CHLORIDE 7.45 MG/ML
10 INJECTION INTRAVENOUS ONCE
Status: COMPLETED | OUTPATIENT
Start: 2022-11-16 | End: 2022-11-16

## 2022-11-16 RX ORDER — ACETAMINOPHEN 650 MG/1
650 SUPPOSITORY RECTAL EVERY 6 HOURS PRN
Status: DISCONTINUED | OUTPATIENT
Start: 2022-11-16 | End: 2022-11-16

## 2022-11-16 RX ORDER — SODIUM CHLORIDE 9 MG/ML
30 INJECTION, SOLUTION INTRAVENOUS ONCE
Status: COMPLETED | OUTPATIENT
Start: 2022-11-16 | End: 2022-11-16

## 2022-11-16 RX ORDER — DEXTROAMPHETAMINE SACCHARATE, AMPHETAMINE ASPARTATE, DEXTROAMPHETAMINE SULFATE AND AMPHETAMINE SULFATE 5; 5; 5; 5 MG/1; MG/1; MG/1; MG/1
20 TABLET ORAL 3 TIMES DAILY PRN
COMMUNITY

## 2022-11-16 RX ORDER — ONDANSETRON 2 MG/ML
4 INJECTION INTRAMUSCULAR; INTRAVENOUS EVERY 6 HOURS PRN
Status: DISCONTINUED | OUTPATIENT
Start: 2022-11-16 | End: 2022-11-19 | Stop reason: HOSPADM

## 2022-11-16 RX ORDER — POTASSIUM CHLORIDE 20 MEQ/1
40 TABLET, EXTENDED RELEASE ORAL ONCE
Status: DISCONTINUED | OUTPATIENT
Start: 2022-11-16 | End: 2022-11-16

## 2022-11-16 RX ORDER — CLONAZEPAM 0.5 MG/1
0.5 TABLET ORAL EVERY 12 HOURS PRN
Status: DISCONTINUED | OUTPATIENT
Start: 2022-11-16 | End: 2022-11-19 | Stop reason: HOSPADM

## 2022-11-16 RX ORDER — SODIUM CHLORIDE 0.9 % (FLUSH) 0.9 %
5-40 SYRINGE (ML) INJECTION PRN
Status: DISCONTINUED | OUTPATIENT
Start: 2022-11-16 | End: 2022-11-16

## 2022-11-16 RX ORDER — FAMOTIDINE 40 MG/1
40 TABLET, FILM COATED ORAL DAILY PRN
COMMUNITY

## 2022-11-16 RX ORDER — FAMOTIDINE 20 MG/1
20 TABLET, FILM COATED ORAL DAILY
Status: DISCONTINUED | OUTPATIENT
Start: 2022-11-16 | End: 2022-11-19 | Stop reason: HOSPADM

## 2022-11-16 RX ORDER — SODIUM CHLORIDE 9 MG/ML
INJECTION, SOLUTION INTRAVENOUS PRN
Status: DISCONTINUED | OUTPATIENT
Start: 2022-11-16 | End: 2022-11-16

## 2022-11-16 RX ORDER — ONDANSETRON 4 MG/1
4 TABLET, ORALLY DISINTEGRATING ORAL EVERY 8 HOURS PRN
COMMUNITY

## 2022-11-16 RX ORDER — INSULIN LISPRO 100 [IU]/ML
0-4 INJECTION, SOLUTION INTRAVENOUS; SUBCUTANEOUS NIGHTLY
Status: DISCONTINUED | OUTPATIENT
Start: 2022-11-16 | End: 2022-11-19 | Stop reason: HOSPADM

## 2022-11-16 RX ORDER — ONDANSETRON 4 MG/1
4 TABLET, ORALLY DISINTEGRATING ORAL EVERY 8 HOURS PRN
Status: DISCONTINUED | OUTPATIENT
Start: 2022-11-16 | End: 2022-11-19 | Stop reason: HOSPADM

## 2022-11-16 RX ORDER — SODIUM CHLORIDE 0.9 % (FLUSH) 0.9 %
5-40 SYRINGE (ML) INJECTION EVERY 12 HOURS SCHEDULED
Status: DISCONTINUED | OUTPATIENT
Start: 2022-11-16 | End: 2022-11-19 | Stop reason: HOSPADM

## 2022-11-16 RX ORDER — MAGNESIUM SULFATE IN WATER 40 MG/ML
2000 INJECTION, SOLUTION INTRAVENOUS ONCE
Status: COMPLETED | OUTPATIENT
Start: 2022-11-16 | End: 2022-11-16

## 2022-11-16 RX ORDER — MAGNESIUM SULFATE IN WATER 40 MG/ML
2000 INJECTION, SOLUTION INTRAVENOUS ONCE
Status: DISCONTINUED | OUTPATIENT
Start: 2022-11-16 | End: 2022-11-16

## 2022-11-16 RX ORDER — OXYCODONE HYDROCHLORIDE 5 MG/1
5 TABLET ORAL EVERY 8 HOURS PRN
Status: ON HOLD | COMMUNITY
End: 2022-11-19 | Stop reason: HOSPADM

## 2022-11-16 RX ORDER — TAMSULOSIN HYDROCHLORIDE 0.4 MG/1
0.4 CAPSULE ORAL EVERY MORNING
COMMUNITY

## 2022-11-16 RX ORDER — INSULIN LISPRO 100 [IU]/ML
0-4 INJECTION, SOLUTION INTRAVENOUS; SUBCUTANEOUS
Status: DISCONTINUED | OUTPATIENT
Start: 2022-11-16 | End: 2022-11-19 | Stop reason: HOSPADM

## 2022-11-16 RX ORDER — POTASSIUM CHLORIDE 29.8 MG/ML
20 INJECTION INTRAVENOUS
Status: COMPLETED | OUTPATIENT
Start: 2022-11-16 | End: 2022-11-17

## 2022-11-16 RX ORDER — ACETAMINOPHEN 650 MG/1
650 SUPPOSITORY RECTAL EVERY 6 HOURS PRN
Status: DISCONTINUED | OUTPATIENT
Start: 2022-11-16 | End: 2022-11-19 | Stop reason: HOSPADM

## 2022-11-16 RX ORDER — SODIUM CHLORIDE, SODIUM LACTATE, POTASSIUM CHLORIDE, CALCIUM CHLORIDE 600; 310; 30; 20 MG/100ML; MG/100ML; MG/100ML; MG/100ML
INJECTION, SOLUTION INTRAVENOUS CONTINUOUS
Status: DISCONTINUED | OUTPATIENT
Start: 2022-11-16 | End: 2022-11-17

## 2022-11-16 RX ORDER — SEMAGLUTIDE 1.34 MG/ML
1 INJECTION, SOLUTION SUBCUTANEOUS WEEKLY
COMMUNITY

## 2022-11-16 RX ORDER — SILDENAFIL 100 MG/1
100 TABLET, FILM COATED ORAL PRN
COMMUNITY

## 2022-11-16 RX ORDER — SENNA AND DOCUSATE SODIUM 50; 8.6 MG/1; MG/1
2 TABLET, FILM COATED ORAL DAILY
Status: DISCONTINUED | OUTPATIENT
Start: 2022-11-16 | End: 2022-11-19 | Stop reason: HOSPADM

## 2022-11-16 RX ORDER — SODIUM CHLORIDE 9 MG/ML
INJECTION, SOLUTION INTRAVENOUS PRN
Status: DISCONTINUED | OUTPATIENT
Start: 2022-11-16 | End: 2022-11-19 | Stop reason: HOSPADM

## 2022-11-16 RX ORDER — SODIUM CHLORIDE 0.9 % (FLUSH) 0.9 %
5-40 SYRINGE (ML) INJECTION EVERY 12 HOURS SCHEDULED
Status: DISCONTINUED | OUTPATIENT
Start: 2022-11-16 | End: 2022-11-16

## 2022-11-16 RX ORDER — SODIUM CHLORIDE 0.9 % (FLUSH) 0.9 %
5-40 SYRINGE (ML) INJECTION PRN
Status: DISCONTINUED | OUTPATIENT
Start: 2022-11-16 | End: 2022-11-19 | Stop reason: HOSPADM

## 2022-11-16 RX ADMIN — NOREPINEPHRINE BITARTRATE 5 MCG/MIN: 1 INJECTION, SOLUTION, CONCENTRATE INTRAVENOUS at 14:41

## 2022-11-16 RX ADMIN — NOREPINEPHRINE BITARTRATE 7 MCG/MIN: 1 INJECTION, SOLUTION, CONCENTRATE INTRAVENOUS at 16:03

## 2022-11-16 RX ADMIN — ENOXAPARIN SODIUM 30 MG: 100 INJECTION SUBCUTANEOUS at 17:32

## 2022-11-16 RX ADMIN — DOCUSATE SODIUM 50 MG AND SENNOSIDES 8.6 MG 2 TABLET: 8.6; 5 TABLET, FILM COATED ORAL at 17:32

## 2022-11-16 RX ADMIN — MAGNESIUM SULFATE HEPTAHYDRATE 2000 MG: 40 INJECTION, SOLUTION INTRAVENOUS at 17:34

## 2022-11-16 RX ADMIN — POTASSIUM CHLORIDE 10 MEQ: 7.46 INJECTION, SOLUTION INTRAVENOUS at 14:06

## 2022-11-16 RX ADMIN — CEFEPIME 2000 MG: 2 INJECTION, POWDER, FOR SOLUTION INTRAVENOUS at 23:08

## 2022-11-16 RX ADMIN — CEFEPIME 2000 MG: 2 INJECTION, POWDER, FOR SOLUTION INTRAVENOUS at 12:39

## 2022-11-16 RX ADMIN — POTASSIUM CHLORIDE 20 MEQ: 29.8 INJECTION, SOLUTION INTRAVENOUS at 17:15

## 2022-11-16 RX ADMIN — POTASSIUM CHLORIDE 20 MEQ: 29.8 INJECTION, SOLUTION INTRAVENOUS at 17:57

## 2022-11-16 RX ADMIN — SODIUM CHLORIDE, PRESERVATIVE FREE 10 ML: 5 INJECTION INTRAVENOUS at 21:18

## 2022-11-16 RX ADMIN — SODIUM CHLORIDE, POTASSIUM CHLORIDE, SODIUM LACTATE AND CALCIUM CHLORIDE: 600; 310; 30; 20 INJECTION, SOLUTION INTRAVENOUS at 17:13

## 2022-11-16 RX ADMIN — POTASSIUM CHLORIDE 20 MEQ: 29.8 INJECTION, SOLUTION INTRAVENOUS at 23:54

## 2022-11-16 RX ADMIN — MAGNESIUM SULFATE HEPTAHYDRATE 2000 MG: 40 INJECTION, SOLUTION INTRAVENOUS at 14:09

## 2022-11-16 RX ADMIN — SODIUM CHLORIDE 30 ML/KG/HR: 9 INJECTION, SOLUTION INTRAVENOUS at 12:26

## 2022-11-16 RX ADMIN — SODIUM CHLORIDE 1000 ML: 9 INJECTION, SOLUTION INTRAVENOUS at 14:04

## 2022-11-16 ASSESSMENT — PAIN DESCRIPTION - LOCATION
LOCATION: BACK
LOCATION: ABDOMEN

## 2022-11-16 ASSESSMENT — PAIN - FUNCTIONAL ASSESSMENT
PAIN_FUNCTIONAL_ASSESSMENT: 0-10
PAIN_FUNCTIONAL_ASSESSMENT: NONE - DENIES PAIN

## 2022-11-16 ASSESSMENT — PAIN DESCRIPTION - PAIN TYPE
TYPE: ACUTE PAIN

## 2022-11-16 ASSESSMENT — PAIN DESCRIPTION - DIRECTION: RADIATING_TOWARDS: ABDOMEN

## 2022-11-16 ASSESSMENT — ENCOUNTER SYMPTOMS
EYE DISCHARGE: 0
VOMITING: 0
NAUSEA: 1
ABDOMINAL DISTENTION: 0
COUGH: 0
WHEEZING: 0
CHOKING: 0
ABDOMINAL PAIN: 1
CHEST TIGHTNESS: 0
EYE REDNESS: 0
SORE THROAT: 0
COLOR CHANGE: 1
BACK PAIN: 0
NAUSEA: 0
VOMITING: 1
BLOOD IN STOOL: 0
RHINORRHEA: 0
SHORTNESS OF BREATH: 0
SINUS PRESSURE: 0
EYE PAIN: 0
CONSTIPATION: 1
DIARRHEA: 0
COLOR CHANGE: 0

## 2022-11-16 ASSESSMENT — PAIN DESCRIPTION - DESCRIPTORS: DESCRIPTORS: BURNING;CRAMPING

## 2022-11-16 ASSESSMENT — PAIN SCALES - GENERAL
PAINLEVEL_OUTOF10: 6
PAINLEVEL_OUTOF10: 7

## 2022-11-16 ASSESSMENT — PAIN DESCRIPTION - FREQUENCY
FREQUENCY: CONTINUOUS
FREQUENCY: CONTINUOUS
FREQUENCY: INTERMITTENT

## 2022-11-16 ASSESSMENT — PAIN DESCRIPTION - ORIENTATION
ORIENTATION: LEFT
ORIENTATION: LEFT

## 2022-11-16 NOTE — PROGRESS NOTES
Patient admitted from ER to 216, with the following belongings clothes, shoes, cell phone, placed on monitor, patient oriented to room and unit visiting hours. Patient guide at bedside, reviewed patient rights and responsibilities. MRSA nasal swab obtained. Bed alarm on. Call light within reach.

## 2022-11-16 NOTE — CONSULTS
Critical Care Admit/Consult Note         Patient - Maria A Whyte   MRN -  [id number]   Vijay # - [de-identified]   - 1965      Date of Admission -  2022 11:26 AM  Date of evaluation -  2022  0216/0216-A   Hospital Day - 0            ADMIT/CONSULT DETAILS     Reason for Admit/Consult   Sepsis Shock     Consulting Vinita Lo MD  Primary Care Physician - No primary care provider on file. ICU attending - Dr. Jaylan NARANJO   The patient is a 61 y.o. male with significant past medical history of diabetes, and kidney stones with recent ureteral stent placed last Thursday procedure at [facility] clinic presents with Flank Pain (Left, had stent placed last week, states \"I'm getting worse\" )  He presents from home to the emergency department for evaluation of fevers, vomiting and malaise. He developed kidney stones 10 days ago , and left ureteral stent placed then last Thursday. Patient was discharged from the hospital on Saturday and his symptoms started on Monday. Fevers have been progressing gradually with 104 overnight. No aggravating or alleviating factors nor palliative measures taken for the fever. Patient states he had 2 episodes of nonbilious vomiting a day. His urologist prescribed him Zofran yesterday which alleviated his symptoms. Patient reports he has decreased oral intake due to his symptoms. Patient admit to the following: tension headache, malaise, and left flank pain, and dysuria  Patient denies the following: Chest pain, cough, shortness of breath, leg swelling, joint erythema/tenderness, sick contacts, current antibiotic use. Upon arrival to the emergency department he was found to be hypotensive, febrile and tachycardic.   Labs are remarkable for hypokalemia, hypomagnesemia,  lactic 2.3, Pro-Kamar 24.1, , ALT 37, T bili 1.4,   Creatinine 1.9-Baseline is 1  CT abdomen pelvis notable for good positioning of left double-J stent with no evidence of left ureteral stones. In the emergency department sepsis protocol was initiated. He was given fluids with no improvement in blood pressure. A central line was placed with initiation of Levophed. Awake and following commands: Yes  Current Ventilation: No ventilator support  Secretions: None  Sedation: None  Paralyzed: None  Vasopressors: Norepinephrine    Past Medical History         Diagnosis Date    Anxiety     Depression     Diabetes mellitus (HCC)     Fibromyalgia     Left retinal detachment     Metabolic syndrome     Obesity         Past Surgical History           Procedure Laterality Date    CHOLECYSTECTOMY      RETINAL DETACHMENT SURGERY Left 8/28/2020    LEFT EYE PARS PLANA VITRECTOMY 25 GAUGE RETINAL DETACHMENT REPAIR LASER AND GAS FLUID EXCHANGE (SF6) performed by Ivy Hassan MD at [address] [place] [address]  2015    VASECTOMY  2004       [facility] 34:    Social History       Tobacco History       Smoking Status  Never      Smokeless Tobacco Use  Never                    Current Medications   Current Medications    sodium chloride flush  5-40 mL IntraVENous 2 times per day     glucose, dextrose bolus **OR** dextrose bolus, glucagon (rDNA), sodium chloride flush, sodium chloride, acetaminophen **OR** acetaminophen  IV Drips/Infusions   norepinephrine 5 mcg/min (11/16/22 1634)    sodium chloride       Home Medications  Medications Prior to Admission: amphetamine-dextroamphetamine (ADDERALL) 20 MG tablet, Take 20 mg by mouth 3 times daily as needed (TO IMPROVE FOCUS).   ondansetron (ZOFRAN-ODT) 4 MG disintegrating tablet, Take 4 mg by mouth every 8 hours as needed for Nausea or Vomiting  oxybutynin (DITROPAN-XL) 10 MG extended release tablet, Take 10 mg by mouth daily as needed (BLADDER SPASMS)  Semaglutide, 1 MG/DOSE, (OZEMPIC, 1 MG/DOSE,) 4 MG/3ML SOPN, Inject 1 mg into the skin once a week **SATURDAYS**  sildenafil (VIAGRA) 100 MG tablet, Take 100 mg by mouth as needed for Erectile Dysfunction  tamsulosin (FLOMAX) 0.4 MG capsule, Take 0.4 mg by mouth every morning  clonazePAM (KLONOPIN) 0.5 MG tablet, Take 0.5 mg by mouth 2 times daily as needed for Anxiety. oxyCODONE (ROXICODONE) 5 MG immediate release tablet, Take 5 mg by mouth every 8 hours as needed for Pain.  famotidine (PEPCID) 40 MG tablet, Take 40 mg by mouth daily as needed (HEARTBURN)  DULoxetine (CYMBALTA) 60 MG extended release capsule, Take 60 mg by mouth nightly  QUEtiapine (SEROQUEL) 200 MG tablet, Take 200 mg by mouth nightly    Diet/Nutrition   ADULT DIET; Regular    Allergies   Statins    Social History   Tobacco   reports that he has never smoked. He has never used smokeless tobacco.    Alcohol     reports no history of alcohol use. Occupational history/exposure? Family History         Family history unknown: Yes       ROS   Review of Systems   Constitutional:  Positive for chills, diaphoresis, fatigue and fever. HENT:  Negative for congestion, rhinorrhea, sinus pressure, sneezing and sore throat. Eyes:  Negative for redness and visual disturbance. Respiratory:  Negative for cough, choking, chest tightness, shortness of breath and wheezing. Cardiovascular:  Negative for chest pain and leg swelling. Gastrointestinal:  Positive for abdominal pain, constipation, nausea and vomiting. Negative for abdominal distention, blood in stool and diarrhea. Endocrine: Negative for polyphagia and polyuria. Genitourinary:  Positive for dysuria and flank pain. Negative for decreased urine volume, penile discharge and urgency. Musculoskeletal:  Positive for myalgias. Negative for arthralgias, joint swelling and neck stiffness. Skin:  Negative for color change, rash and wound. Allergic/Immunologic: Negative for immunocompromised state. Neurological:  Positive for headaches. Negative for seizures, syncope, facial asymmetry, weakness and numbness.    Psychiatric/Behavioral:  Negative for agitation and behavioral problems. Mechanical Ventilation Data   VENT SETTINGS (Comprehensive)     Additional Respiratory Assessments  Heart Rate: 78  Resp: 18  SpO2: 98 %    ABG  No results found for: PH, PCO2, PO2, HCO3, O2SAT  No results found for: IFIO2, MODE, SETTIDVOL, SETPEEP    Vitals    height is 6' 3\" (1.905 m) and weight is 233 lb 11 oz (106 kg). His oral temperature is 98.2 °F (36.8 °C). His blood pressure is 106/72 and his pulse is 78. His respiration is 18 and oxygen saturation is 98%. Temperature Range: Temp: 98.2 °F (36.8 °C) Temp  Av.3 °F (36.8 °C)  Min: 98.2 °F (36.8 °C)  Max: 98.3 °F (36.8 °C)  BP Range:  Systolic (72JQN), IZW:57 , Min:72 , QLW:185     Diastolic (90VPP), QIO:27, Min:51, Max:72    Pulse Range: Pulse  Av.1  Min: 78  Max: 111  Respiration Range: Resp  Av  Min: 18  Max: 18  Current Pulse Ox[de-identified]  SpO2: 98 %  24HR Pulse Ox Range:  SpO2  Av %  Min: 94 %  Max: 100 %  Oxygen Amount and Delivery:      I/O (24 Hours)  Patient Vitals for the past 8 hrs:   BP Temp Temp src Pulse Resp SpO2 Height Weight   22 1635 -- -- -- -- -- -- 6' 3\" (1.905 m) 233 lb 11 oz (106 kg)   22 1550 106/72 98.2 °F (36.8 °C) Oral 78 18 98 % -- --   22 1441 (!) 84/55 -- -- 89 18 100 % -- --   22 1350 (!) 87/59 -- -- 97 18 94 % -- --   22 1240 101/67 -- -- 98 18 96 % -- --   22 1228 93/61 -- -- (!) 101 18 96 % -- --   22 1223 87/60 -- -- (!) 103 18 97 % -- --   22 1221 (!) 72/51 -- -- 100 18 97 % -- --   22 1126 80/60 98.3 °F (36.8 °C) -- (!) 111 18 98 % 6' 3\" (1.905 m) 220 lb (99.8 kg)     No intake or output data in the 24 hours ending 22 1638  No intake/output data recorded. Patient Vitals for the past 96 hrs (Last 3 readings):   Weight   22 1635 233 lb 11 oz (106 kg)   22 1126 220 lb (99.8 kg)       Exam   Physical Exam  Constitutional:       Appearance: Normal appearance. He is diaphoretic.    HENT:      Head: Normocephalic and atraumatic. Nose: Nose normal. No congestion or rhinorrhea. Mouth/Throat:      Mouth: Mucous membranes are dry. Eyes:      General: No scleral icterus. Extraocular Movements: Extraocular movements intact. Conjunctiva/sclera: Conjunctivae normal.      Pupils: Pupils are equal, round, and reactive to light. Cardiovascular:      Rate and Rhythm: Normal rate and regular rhythm. Heart sounds: No murmur heard. No friction rub. Pulmonary:      Effort: Pulmonary effort is normal.      Breath sounds: Normal breath sounds. Abdominal:      General: There is no distension. Palpations: Abdomen is soft. Tenderness: There is left CVA tenderness. There is no right CVA tenderness, guarding or rebound. Comments: Slight left flank tenderness    Musculoskeletal:         General: No tenderness, deformity or signs of injury. Cervical back: Normal range of motion and neck supple. Right lower leg: No edema. Left lower leg: No edema. Skin:     General: Skin is warm. Capillary Refill: Capillary refill takes 2 to 3 seconds. Coloration: Skin is not jaundiced. Findings: No rash. Neurological:      General: No focal deficit present. Mental Status: He is alert and oriented to person, place, and time. Mental status is at baseline.        Data   Old records and images have been reviewed    Lab Results   CBC     Lab Results   Component Value Date/Time    WBC 20.0 11/16/2022 11:54 AM    RBC 4.11 11/16/2022 11:54 AM    HGB 12.4 11/16/2022 11:54 AM    HCT 37.1 11/16/2022 11:54 AM     11/16/2022 11:54 AM    MCV 90.3 11/16/2022 11:54 AM    MCH 30.2 11/16/2022 11:54 AM    MCHC 33.4 11/16/2022 11:54 AM    RDW 13.5 11/16/2022 11:54 AM    NRBC 0.0 11/16/2022 11:54 AM    SEGSPCT 77 09/18/2013 08:40 PM    LYMPHOPCT 1.7 11/16/2022 11:54 AM    MONOPCT 3.5 11/16/2022 11:54 AM    BASOPCT 0.0 11/16/2022 11:54 AM    MONOSABS 0.80 11/16/2022 11:54 AM LYMPHSABS 0.40 11/16/2022 11:54 AM    EOSABS 0.00 11/16/2022 11:54 AM    BASOSABS 0.00 11/16/2022 11:54 AM       BMP   Lab Results   Component Value Date/Time     11/16/2022 11:54 AM    K 2.9 11/16/2022 11:54 AM    K 3.5 06/13/2021 03:00 AM    CL 95 11/16/2022 11:54 AM    CO2 25 11/16/2022 11:54 AM    BUN 24 11/16/2022 11:54 AM    CREATININE 1.9 11/16/2022 11:54 AM    GLUCOSE 121 11/16/2022 11:54 AM    CALCIUM 8.4 11/16/2022 11:54 AM       LFTS  Lab Results   Component Value Date/Time    ALKPHOS 100 11/16/2022 11:54 AM    ALT 66 11/16/2022 11:54 AM     11/16/2022 11:54 AM    PROT 6.0 11/16/2022 11:54 AM    BILITOT 1.4 11/16/2022 11:54 AM    BILIDIR 1.0 11/16/2022 11:54 AM    IBILI 0.4 11/16/2022 11:54 AM    LABALBU 2.9 11/16/2022 11:54 AM       INR  No results for input(s): PROTIME, INR in the last 72 hours. APTT  No results for input(s): APTT in the last 72 hours. Lactic Acid  Lab Results   Component Value Date/Time    LACTA 2.3 11/16/2022 11:54 AM        BNP   No results for input(s): BNP in the last 72 hours. Cultures     No results for input(s): BC in the last 72 hours. No results for input(s): [place] Fle[place] in the last 72 hours. No results for input(s): LABURIN in the last 72 hours. Radiology   11/16/2022  X-ray chest portable: Right IJ catheter in good position. Lungs are clear  CT abdomen pelvis  1. Left double-J stent in place with no evidence of left urolithiasis. There   is minimal left hydronephrosis with left perirenal and periureteral stranding. 2. There is mild fat stranding adjacent to the urinary bladder and prostate   gland with enlargement of the prostate gland. Consider prostatitis or   cystitis. 3. Right nonobstructive nephrolithiasis. 4. Possible constipation. 5. Fatty liver.          SYSTEMS ASSESSMENT    Neuro  No acute concerns   Respiratory  No acute concerns patient is on room air in no respiratory distress and no history of lung disease  Monitor pulmonary exam    Cardiovascular  Tachycardia and elevated troponin  Likely due to demand ischemia from shock  EKG twelve-lead EKG ED read sinus tachycardia with no ST elevations and no hyperacute T waves. Resolved after fluid/pressor resuscitation  Shock  Sepsis (vasodilated) versus hypovolemic (loss of fluid through vomiting)  Patient is febrile and episodes of vomiting and decreased oral intake  Lactic Ringer's and norepinephrine. Wean norepinephrine as possible  Continue cefepime  Maintain MAP above 65    GI  Emesis  -Zofran as needed  Constipation  -Bowel regimen senna and Colace  Fatty Liver on CT abdomen   -Monitor levels    Renal  TOSHIA  -Creatinine 1.9. Baseline one-point  -Likely due to poor perfusion from shock  -History of kidney stones. Postoperative day 7 left ureteral stent in [facility]  -Urology is following appreciate consult  -Lactated Ringer's and norepinephrine    Hypokalemia and hypomagnesemia  -Placed in the emergency department  -Replace as needed. Infectious disease  Sepsis  -Febrile increased leukocytosis and hypotensive upon emergency department  -Source include but not limited to septic stone, UTI, viral illness  -Blood culture and urine culture pending  -Respiratory panel pending  -Continue cefepime    Heme/Onc  No acute concerns  -Drop in 3 points from baseline .hemoglobin within normal limits  -Monitor H&H    Endocrine  Type 2 diabetes  -Last A1c reported from patient 5.2% three weeks ago  -Low-dose sliding scale    Social/Spiritual/DNR/Other  Code status: Full  Diet ADULT DIET; Regular  Stress ulcer prophylaxis: Famotidine  DVT prophylaxis: Lovenox  Consultations needed: Yes  Transfer out of ICU today? No    Lines/catheters  11/16/2022  -Right IJ central line        Dom Michel DO  4:38 PM  11/16/22        I personally saw, examined and provided care for the patient. Radiographs, labs and medication list were reviewed by me independently.  Review of Residents documentation was conducted and revisions were made as appropriate. I agree with the above documented exam, problem list and plan of care.         CCT excluding procedures 36 minutes    Teodora Goldberg, DO

## 2022-11-16 NOTE — CONSULTS
[facility], [facility].  [address] [place] [address]. [facility], [address] [facility]  [phone]  FAX [phone]        REASON FOR CONSULTATION:      Sepsis after stone procedure at [facility]    HISTORY OF PRESENT ILLNESS:      The patient is a 61 y.o. male patient who presents with sepsis. He underwent a stone procedure at [facility] on Thursday. He has been having fever, chills, malaise, and feeling very sick. He states that they were not able to remove the stone but placed a stent and were planning to return at another date to remove the stone. Past Medical History:   Diagnosis Date    Anxiety     Depression     Diabetes mellitus ([address]     Fibromyalgia     Left retinal detachment     Metabolic syndrome     Obesity          Past Surgical History:   Procedure Laterality Date    CHOLECYSTECTOMY      RETINAL DETACHMENT SURGERY Left 8/28/2020    LEFT EYE PARS PLANA VITRECTOMY 25 GAUGE RETINAL DETACHMENT REPAIR LASER AND GAS FLUID EXCHANGE [facility]) performed by Ben Mccarthy MD at [address] [place] [address]  2015    VASECTOMY  2004       Medications Prior to Admission:    Not in a hospital admission. Allergies:    Patient has no known allergies. Social History:    reports that he has never smoked. He has never used smokeless tobacco. He reports that he does not drink alcohol and does not use drugs. Family History:   Non-contributory to this Urological problem  Family history is unknown by patient.     REVIEW OF SYSTEMS:  Malaise, fever, chills  Respiratory: negative for cough and hemoptysis  Cardiovascular: negative for chest pain and dyspnea, hypotension  Gastrointestinal: Nausea   Derm: negative for rash and skin lesion(s)  Neurological: negative for seizures and tremors  Endocrine: negative for diabetic symptoms including polydipsia and polyuria  : As above in the HPI, otherwise negative  All other systems negative    PHYSICAL EXAM:    Vitals:  BP (!) 84/55   Pulse 89   Temp 98.3 °F (36.8 °C)   Resp 18   Ht 6' 3\" (1.905 m)   Wt 220 lb (99.8 kg)   SpO2 100%   BMI 27.50 kg/m²     General:  Awake, alert, oriented X 3. Well developed, well nourished, well groomed. No apparent distress. HEENT:  Normocephalic, atraumatic. Pupils equal, round. No scleral icterus. No conjunctival injection. Normal lips, teeth, and gums. No nasal discharge. Neck:  Supple, no masses. Heart: Tachycardic, hypotensive  Lungs:  No audible wheezing. Respirations symmetric and non-labored. Abdomen:  soft, nontender, no masses, no organomegaly, no peritoneal signs  Extremities:  No clubbing, cyanosis, or edema  Skin:  Warm and dry, no open lesions or rashes  Neuro:  Cranial nerves 2-12 intact, no focal deficits  Rectal: deferred  Genitalia:  deferred    LABS:    Lab Results   Component Value Date    WBC 20.0 (H) 11/16/2022    HGB 12.4 (L) 11/16/2022    HCT 37.1 11/16/2022    MCV 90.3 11/16/2022     11/16/2022       Lab Results   Component Value Date    CREATININE 1.9 (H) 11/16/2022       Lab Results   Component Value Date    PSA 1.46 12/17/2019    PSA 1.25 12/20/2018    PSA see note (AA) 12/05/2017    PSA 1.35 12/05/2017           ASSESSMENT / PLAN:      1. Sepsis, hypotension after kidney stone procedure [facility] on Thursday. Reviewed CT scan and appears that the stent is indwelling and is in good position. There is no evidence of hydronephrosis. Discussed with emergency room team.  Patient is to be admitted to intensive care unit with pressors. Has a central line that was recently placed. No acute urological intervention. We will follow along with you. We will need to follow-up in [facility] [place] [facility] once discharged from hospital with his urologist there.       Aleena Dent MD, M.D.  3:13 PM  11/16/2022

## 2022-11-16 NOTE — ED PROVIDER NOTES
HPI   79-year-old male patient in to emergency department for evaluation of left-sided flank pain, fevers. Patient notes history of kidney stones, he had a ureteral stent placed last Thursday at [facility] [employer] for kidney stone. He is reporting that since Monday for the last 2 to 3 days he has been having flank pain, fever of 104 overnight and dysuria. Denies any vomiting, chest pain, shortness of breath. Denies having had similar symptoms previous. Currently not on any antibiotics. Patient symptoms are severe in severity, persistent. Review of Systems   Constitutional:  Positive for chills and fever. HENT:  Negative for ear pain, sinus pressure and sore throat. Eyes:  Negative for pain, discharge and redness. Respiratory:  Negative for cough, shortness of breath and wheezing. Cardiovascular:  Negative for chest pain. Gastrointestinal:  Positive for abdominal pain. Negative for diarrhea, nausea and vomiting. Genitourinary:  Positive for dysuria and flank pain. Negative for frequency. Musculoskeletal:  Negative for arthralgias and back pain. Skin:  Negative for rash and wound. Neurological:  Negative for weakness and headaches. Hematological:  Negative for adenopathy. All other systems reviewed and are negative. Physical Exam  Vitals and nursing note reviewed. Constitutional:       Appearance: He is well-developed. HENT:      Head: Normocephalic and atraumatic. Eyes:      Conjunctiva/sclera: Conjunctivae normal.   Cardiovascular:      Rate and Rhythm: Regular rhythm. Tachycardia present. Heart sounds: Normal heart sounds. No murmur heard. Pulmonary:      Effort: Pulmonary effort is normal. No respiratory distress. Breath sounds: Normal breath sounds. No wheezing or rales. Abdominal:      General: Bowel sounds are normal.      Palpations: Abdomen is soft. Tenderness: There is abdominal tenderness (Left flank). There is left CVA tenderness.  There is no guarding or rebound. Musculoskeletal:         General: No tenderness or deformity. Cervical back: Normal range of motion and neck supple. Skin:     General: Skin is warm and dry. Neurological:      Mental Status: He is alert and oriented to person, place, and time. Cranial Nerves: No cranial nerve deficit. Coordination: Coordination normal.        Procedures     MDM  Patient here for evaluation of fevers. Patient found to be hypotensive here. Mildly elevated renal function. Patient found to have elevated white count of 28, hypokalemic and hypomagnesemic. We initiated sepsis fluid bolus patient is still persistently hypotensive and we decided to place central line as well as initiate Levophed. CT scan of his abdomen pelvis showing significant good positioning, no hydro no stone. At this time patient be admitted to the ICU, urology aware they consulted on patient in the ED. ED Course as of 22 1543      1211 EC  Hr 104  Qtc 436  Normal axis and intervals no ischemic findings [JEROME]   1308 Patient reassessed his blood pressure has come up. He is getting sepsis bolus for fluids. Taking appropriately he states he feels better. [FG]   1507 D/w dr cee urology saw pt in ED [JEROME]   1507 D/w dr Marilou Zacarias intensivist will accept to icu [JEROME]   [phone] D/w dr Prateek Julian hospitalist will admit [JEROME]   [phone] Critical care:  Please note that the withdrawal or failure to initiate urgent interventions for this patient would likely result in a life threatening deterioration or permanent disability. Accordingly this patient received 45 minutes of critical care time, excluding separately billable procedures. [JEROME]      ED Course User Index  [FG] Aundrea Olguin DO  [JEROME] Leobardo Murphy DO     ED Course as of 22 1543      1211 EC  Hr 104  Qtc 436  Normal axis and intervals no ischemic findings [JEROME]   1308 Patient reassessed his blood pressure has come up.   He is getting sepsis bolus for fluids. Taking appropriately he states he feels better. [FG]   1507 D/w dr cee urology saw pt in ED [JEROME]   1507 D/w dr Avelina Hannah intensivist will accept to icu [JEROME]   [phone] D/w dr Juan Antonio Ríos hospitalist will admit [JEROME]   [phone] Critical care:  Please note that the withdrawal or failure to initiate urgent interventions for this patient would likely result in a life threatening deterioration or permanent disability. Accordingly this patient received 45 minutes of critical care time, excluding separately billable procedures. [JEROME]      ED Course User Index  [FG] Mai Oshea DO  [JEROME] Tracey Mills DO       --------------------------------------------- PAST HISTORY ---------------------------------------------  Past Medical History:  has a past medical history of Anxiety, Depression, Diabetes mellitus ([address] Fibromyalgia, Left retinal detachment, Metabolic syndrome, and Obesity. Past Surgical History:  has a past surgical history that includes Cholecystectomy; Umbilical hernia repair (2015); Vasectomy (2004); and Retinal detachment surgery (Left, 8/28/2020). Social History:  reports that he has never smoked. He has never used smokeless tobacco. He reports that he does not drink alcohol and does not use drugs. Family History: Family history is unknown by patient. The patients home medications have been reviewed. Allergies: Patient has no known allergies.     -------------------------------------------------- RESULTS -------------------------------------------------    Lab  Results for orders placed or performed during the hospital encounter of 11/16/22   COVID-19, Rapid    Specimen: Nasopharyngeal Swab   Result Value Ref Range    SARS-CoV-2, NAAT Not Detected Not Detected   CBC with Auto Differential   Result Value Ref Range    WBC 20.0 (H) 4.5 - 11.5 E9/L    RBC 4.11 3.80 - 5.80 E12/L    Hemoglobin 12.4 (L) 12.5 - 16.5 g/dL    Hematocrit 37.1 37.0 - 54.0 %    MCV 90.3 80.0 - 99.9 fL    MCH 30.2 26.0 - 35.0 pg    MCHC 33.4 32.0 - 34.5 %    RDW 13.5 11.5 - 15.0 fL    Platelets 730 058 - 927 E9/L    MPV 9.3 7.0 - 12.0 fL    Neutrophils % 94.8 (H) 43.0 - 80.0 %    Lymphocytes % 1.7 (L) 20.0 - 42.0 %    Monocytes % 3.5 2.0 - 12.0 %    Eosinophils % 0.0 0.0 - 6.0 %    Basophils % 0.0 0.0 - 2.0 %    Neutrophils Absolute 19.00 (H) 1.80 - 7.30 E9/L    Lymphocytes Absolute 0.40 (L) 1.50 - 4.00 E9/L    Monocytes Absolute 0.80 0.10 - 0.95 E9/L    Eosinophils Absolute 0.00 (L) 0.05 - 0.50 E9/L    Basophils Absolute 0.00 0.00 - 0.20 E9/L    nRBC 0.0 /100 WBC    Polychromasia 1+     Poikilocytes 1+     Fernando Cells 1+    BMP   Result Value Ref Range    Sodium 134 132 - 146 mmol/L    Potassium 2.9 (L) 3.5 - 5.0 mmol/L    Chloride 95 (L) 98 - 107 mmol/L    CO2 25 22 - 29 mmol/L    Anion Gap 14 7 - 16 mmol/L    Glucose 121 (H) 74 - 99 mg/dL    BUN 24 (H) 6 - 20 mg/dL    Creatinine 1.9 (H) 0.7 - 1.2 mg/dL    Est, Glom Filt Rate 40 >=60 mL/min/1.73    Calcium 8.4 (L) 8.6 - 10.2 mg/dL   Hepatic Function Panel   Result Value Ref Range    Total Protein 6.0 (L) 6.4 - 8.3 g/dL    Albumin 2.9 (L) 3.5 - 5.2 g/dL    Alkaline Phosphatase 100 40 - 129 U/L    ALT 66 (H) 0 - 40 U/L     (H) 0 - 39 U/L    Total Bilirubin 1.4 (H) 0.0 - 1.2 mg/dL    Bilirubin, Direct 1.0 (H) 0.0 - 0.3 mg/dL    Bilirubin, Indirect 0.4 0.0 - 1.0 mg/dL   Lactic Acid   Result Value Ref Range    Lactic Acid 2.3 (H) 0.5 - 2.2 mmol/L   Magnesium   Result Value Ref Range    Magnesium 1.3 (L) 1.6 - 2.6 mg/dL   Lipase   Result Value Ref Range    Lipase 21 13 - 60 U/L   Troponin   Result Value Ref Range    Troponin, High Sensitivity 23 (H) 0 - 11 ng/L   TSH   Result Value Ref Range    TSH 2.420 0.270 - 4.200 uIU/mL   EKG 12 Lead   Result Value Ref Range    Ventricular Rate 104 BPM    Atrial Rate 104 BPM    P-R Interval 120 ms    QRS Duration 78 ms    Q-T Interval 332 ms    QTc Calculation (Bazett) 436 ms    P Axis 3 degrees    R Axis 59 degrees    T Axis 39 degrees       Radiology  XR CHEST PORTABLE   Final Result   Right IJ catheter in good position with no pneumothorax. Stable chest.         CT ABDOMEN PELVIS WO CONTRAST Additional Contrast? None   Final Result   1. Left double-J stent in place with no evidence of left urolithiasis. There   is minimal left hydronephrosis with left perirenal and periureteral stranding. 2. There is mild fat stranding adjacent to the urinary bladder and prostate   gland with enlargement of the prostate gland. Consider prostatitis or   cystitis. 3. Right nonobstructive nephrolithiasis. 4. Possible constipation. 5. Fatty liver.           ------------------------- NURSING NOTES AND VITALS REVIEWED ---------------------------  Date / Time Roomed:  11/16/2022 11:26 AM  ED Bed Assignment:  05/05    The nursing notes within the ED encounter and vital signs as below have been reviewed. Patient Vitals for the past 24 hrs:   BP Temp Pulse Resp SpO2 Height Weight   11/16/22 1441 (!) 84/55 -- 89 18 100 % -- --   11/16/22 1350 (!) 87/59 -- 97 18 94 % -- --   11/16/22 1240 101/67 -- 98 18 96 % -- --   11/16/22 1228 93/61 -- (!) 101 18 96 % -- --   11/16/22 1223 87/60 -- (!) 103 18 97 % -- --   11/16/22 1221 (!) 72/51 -- 100 18 97 % -- --   11/16/22 1126 80/60 98.3 °F (36.8 °C) (!) 111 18 98 % 6' 3\" (1.905 m) 220 lb (99.8 kg)       I have spoken with the patient and discussed todays results, in addition to providing specific details for the plan of care and counseling regarding the diagnosis and prognosis.   Their questions are answered at this time and they are agreeable with the plan.      --------------------------------- ADDITIONAL PROVIDER NOTES ---------------------------------      This patient's ED course included: a personal history and physicial examination, re-evaluation prior to disposition, multiple bedside re-evaluations, IV medications, cardiac monitoring, continuous pulse oximetry, and complex medical decision making and emergency management    This patient has remained hemodynamically stable and improved during their ED course. Please note that the withdrawal or failure to initiate urgent interventions for this patient would likely result in a life threatening deterioration or permanent disability. Accordingly this patient received 45minutes of critical care time, excluding separately billable procedures. Clinical Impression  1. Sepsis, due to unspecified organism, unspecified whether acute organ dysfunction present ([facility] [address]    2. Ureteral stent present    3. Hypomagnesemia    4.  Hypokalemia          Disposition  Patient's disposition: Admit to CCU/ICU  Patient's condition is critical.       Bernard Rivera DO  Resident  11/16/22 [id number]

## 2022-11-16 NOTE — ED NOTES
PROCEDURE  11/16/22       Time: 1100    CENTRAL LINE INSERTION  Risks, benefits and alternatives (for applicable procedures below) described. Performed By: Rachael Burkitt, DO and EM Attending Physician. Indication: centrally administered medications. Informed consent: Written consent obtained. The patient was counseled regarding the procedure in person, it's indications, risks, potential complications and alternatives and any questions were answered. Consent was obtained. .  Procedure: After routine sterile preparation, local anesthesia obtained by infiltration using 1% Lidocaine without epinephrine. A right 3-Lumen 7F Central Venous Catheter was placed by internal jugular vein approach and secured by standard fashion. Ultrasound Guidance:   used. Number of Attempts: 1  Post-procedure Findings: A post procedural chest x-ray  was ordered and showed good line position. Patient tolerated the procedure well.          Rachael Burkitt, DO  Resident  11/16/22 7220

## 2022-11-16 NOTE — PROGRESS NOTES
Database initiated. Patient is A&O from home with wife. States he uses no assistive device and is RA at baseline.

## 2022-11-16 NOTE — H&P
[facility] [address]  Resident History and Physical      CHIEF COMPLAINT:    Chief Complaint   Patient presents with    Flank Pain     Left, had stent placed last week, states \"I'm getting worse\"         History of Present Illness:   Dirk Davenport  is a 61 y.o. male patient of [employer]  with a pertinent PMHx of ureterolithiasis with recent stenting, mood disorder who presents with chief complaint of flank pain. Patient states that he has known kidney stones and 6 days ago underwent left ureter stenting in [facility] [employer]. He had been doing well until approximately 4 days ago when he began to feel sick. He began having fever, flank pain, nausea and vomiting at least twice daily. He and his wife became most concerned when he woke up with cold sweats and a fever of 104 degrees. He additionally endorses dysuria but denies hematuria. He believes he passed a kidney stone on Sunday. He denies any lightheadedness, dizziness, visual disturbance, syncope, chest pain or pressure, shortness of breath, leg pain or swelling. He does state that he has been constipated for about 10 days. ED course: Vitals were remarkable for hypotension with systolic of 84 even after 4 L of normal saline, tachycardia at 1 point of 111 bpm. Labs were remarkable for potassium of 2.9, creatinine of 1.9 (baseline approximately 1.1), lactic acidosis of 2.3, magnesium of 1.3, Pro-Kamar of 24.81, troponin 23, hyperbilirubinemia 1.4, direct bilirubin elevation 1.0, leukocytosis of 20, hemoglobin 12.4, UA significant for large blood, positive nitrites, large leukocyte esterase. CXR was remarkable for  right IJ CVC in place without pneumothorax . EKG was remarkable for sinus tachycardia. In the emergency department, patient received 1 L normal saline bolus x4 without resolution of hypotension. For this reason central line was placed in the right IJ and Levophed drip was started.   Blood cultures and urine cultures were collected and then patient received 2 g of cefepime IV. Patient received 10 mEq IV potassium as well as 2 g of magnesium sulfate IV. Internal medicine was consulted to admit the patient for urosepsis. ROS:     Review of Systems   Constitutional:  Positive for appetite change, chills, diaphoresis, fatigue and fever. Eyes:  Negative for visual disturbance. Respiratory:  Negative for chest tightness and shortness of breath. Cardiovascular:  Negative for chest pain, palpitations and leg swelling. Gastrointestinal:  Positive for abdominal pain, constipation, nausea and vomiting. Negative for blood in stool and diarrhea. Endocrine: Positive for cold intolerance. Negative for heat intolerance. Genitourinary:  Positive for dysuria. Negative for hematuria. Musculoskeletal:  Negative for arthralgias and myalgias. Skin:  Positive for color change. Neurological:  Negative for dizziness, seizures, syncope and light-headedness. Psychiatric/Behavioral:  Negative for confusion. The patient is nervous/anxious. Past Medical History:   Diagnosis Date    Anxiety     Depression     Diabetes mellitus ([address]     Fibromyalgia     Left retinal detachment     Metabolic syndrome     Obesity          Past Surgical History:   Procedure Laterality Date    CHOLECYSTECTOMY      RETINAL DETACHMENT SURGERY Left 8/28/2020    LEFT EYE PARS PLANA VITRECTOMY 25 GAUGE RETINAL DETACHMENT REPAIR LASER AND GAS FLUID EXCHANGE [facility]) performed by Britt Bonilla MD at [address] [facility]  2015    VASECTOMY  2004       Medications Prior to Admission:    Prior to Admission medications    Medication Sig Start Date End Date Taking? Authorizing Provider   amphetamine-dextroamphetamine (ADDERALL) 20 MG tablet Take 20 mg by mouth 3 times daily as needed (TO IMPROVE FOCUS).    Yes Historical Provider, MD   ondansetron (ZOFRAN-ODT) 4 MG disintegrating tablet Take 4 mg by mouth every 8 hours as needed for Nausea or Vomiting   Yes Historical Provider, MD   oxybutynin (DITROPAN-XL) 10 MG extended release tablet Take 10 mg by mouth daily as needed (BLADDER SPASMS)   Yes Historical Provider, MD   Semaglutide, 1 MG/DOSE, (OZEMPIC, 1 MG/DOSE,) 4 MG/3ML SOPN Inject 1 mg into the skin once a week **SATURDAYS**   Yes Historical Provider, MD   sildenafil (VIAGRA) 100 MG tablet Take 100 mg by mouth as needed for Erectile Dysfunction   Yes Historical Provider, MD   tamsulosin (FLOMAX) 0.4 MG capsule Take 0.4 mg by mouth every morning   Yes Historical Provider, MD   clonazePAM (KLONOPIN) 0.5 MG tablet Take 0.5 mg by mouth 2 times daily as needed for Anxiety. Yes Historical Provider, MD   oxyCODONE (ROXICODONE) 5 MG immediate release tablet Take 5 mg by mouth every 8 hours as needed for Pain. Yes Historical Provider, MD   famotidine (PEPCID) 40 MG tablet Take 40 mg by mouth daily as needed (HEARTBURN)   Yes Historical Provider, MD   DULoxetine (CYMBALTA) 60 MG extended release capsule Take 60 mg by mouth nightly 12/3/20  Yes Historical Provider, MD   QUEtiapine (SEROQUEL) 200 MG tablet Take 200 mg by mouth nightly 11/22/17  Yes Historical Provider, MD        Allergies:   Statins    Social History:    reports that he has never smoked. He has never used smokeless tobacco. He reports that he does not drink alcohol and does not use drugs. Family History:   Family history is unknown by patient. PHYSICAL EXAM:    Vitals:  /75   Pulse 80   Temp 98.5 °F (36.9 °C) (Oral)   Resp 14   Ht 6' 3\" (1.905 m)   Wt 233 lb 11 oz (106 kg)   SpO2 100%   BMI 29.21 kg/m²     Physical Exam  Vitals reviewed. Constitutional:       Appearance: Normal appearance. He is toxic-appearing and diaphoretic. HENT:      Head: Normocephalic and atraumatic. Right Ear: External ear normal.      Left Ear: External ear normal.      Nose: Nose normal.      Mouth/Throat:      Mouth: Mucous membranes are dry. Pharynx: No oropharyngeal exudate. Eyes:      General: No scleral icterus. Extraocular Movements: Extraocular movements intact. Cardiovascular:      Rate and Rhythm: Normal rate and regular rhythm. Heart sounds: Normal heart sounds. No murmur heard. No friction rub. No gallop. Pulmonary:      Effort: Pulmonary effort is normal.      Breath sounds: Normal breath sounds. No wheezing or rales. Abdominal:      General: Abdomen is flat. Palpations: Abdomen is soft. There is no mass. Tenderness: There is no abdominal tenderness. There is no guarding. Musculoskeletal:         General: Normal range of motion. Cervical back: Normal range of motion and neck supple. Right lower leg: No edema. Left lower leg: No edema. Skin:     General: Skin is moist.   Neurological:      General: No focal deficit present. Mental Status: He is alert and oriented to person, place, and time. Psychiatric:         Mood and Affect: Mood normal.         Thought Content:  Thought content normal.       LABS:  Recent Results (from the past 24 hour(s))   CBC with Auto Differential    Collection Time: 11/16/22 11:54 AM   Result Value Ref Range    WBC 20.0 (H) 4.5 - 11.5 E9/L    RBC 4.11 3.80 - 5.80 E12/L    Hemoglobin 12.4 (L) 12.5 - 16.5 g/dL    Hematocrit 37.1 37.0 - 54.0 %    MCV 90.3 80.0 - 99.9 fL    MCH 30.2 26.0 - 35.0 pg    MCHC 33.4 32.0 - 34.5 %    RDW 13.5 11.5 - 15.0 fL    Platelets 813 527 - 104 E9/L    MPV 9.3 7.0 - 12.0 fL    Neutrophils % 94.8 (H) 43.0 - 80.0 %    Lymphocytes % 1.7 (L) 20.0 - 42.0 %    Monocytes % 3.5 2.0 - 12.0 %    Eosinophils % 0.0 0.0 - 6.0 %    Basophils % 0.0 0.0 - 2.0 %    Neutrophils Absolute 19.00 (H) 1.80 - 7.30 E9/L    Lymphocytes Absolute 0.40 (L) 1.50 - 4.00 E9/L    Monocytes Absolute 0.80 0.10 - 0.95 E9/L    Eosinophils Absolute 0.00 (L) 0.05 - 0.50 E9/L    Basophils Absolute 0.00 0.00 - 0.20 E9/L    nRBC 0.0 /100 WBC    Polychromasia 1+     Poikilocytes 1+     Fernando Cells 1+    BMP Collection Time: 11/16/22 11:54 AM   Result Value Ref Range    Sodium 134 132 - 146 mmol/L    Potassium 2.9 (L) 3.5 - 5.0 mmol/L    Chloride 95 (L) 98 - 107 mmol/L    CO2 25 22 - 29 mmol/L    Anion Gap 14 7 - 16 mmol/L    Glucose 121 (H) 74 - 99 mg/dL    BUN 24 (H) 6 - 20 mg/dL    Creatinine 1.9 (H) 0.7 - 1.2 mg/dL    Est, Glom Filt Rate 40 >=60 mL/min/1.73    Calcium 8.4 (L) 8.6 - 10.2 mg/dL   COVID-19, Rapid    Collection Time: 11/16/22 11:54 AM    Specimen: Nasopharyngeal Swab   Result Value Ref Range    SARS-CoV-2, NAAT Not Detected Not Detected   Hepatic Function Panel    Collection Time: 11/16/22 11:54 AM   Result Value Ref Range    Total Protein 6.0 (L) 6.4 - 8.3 g/dL    Albumin 2.9 (L) 3.5 - 5.2 g/dL    Alkaline Phosphatase 100 40 - 129 U/L    ALT 66 (H) 0 - 40 U/L     (H) 0 - 39 U/L    Total Bilirubin 1.4 (H) 0.0 - 1.2 mg/dL    Bilirubin, Direct 1.0 (H) 0.0 - 0.3 mg/dL    Bilirubin, Indirect 0.4 0.0 - 1.0 mg/dL   Lactic Acid    Collection Time: 11/16/22 11:54 AM   Result Value Ref Range    Lactic Acid 2.3 (H) 0.5 - 2.2 mmol/L   Magnesium    Collection Time: 11/16/22 11:54 AM   Result Value Ref Range    Magnesium 1.3 (L) 1.6 - 2.6 mg/dL   Lipase    Collection Time: 11/16/22 11:54 AM   Result Value Ref Range    Lipase 21 13 - 60 U/L   Troponin    Collection Time: 11/16/22 11:54 AM   Result Value Ref Range    Troponin, High Sensitivity 23 (H) 0 - 11 ng/L   TSH    Collection Time: 11/16/22 11:54 AM   Result Value Ref Range    TSH 2.420 0.270 - 4.200 uIU/mL   Procalcitonin    Collection Time: 11/16/22 11:54 AM   Result Value Ref Range    Procalcitonin 24.81 (H) 0.00 - 0.08 ng/mL   EKG 12 Lead    Collection Time: 11/16/22 12:07 PM   Result Value Ref Range    Ventricular Rate 104 BPM    Atrial Rate 104 BPM    P-R Interval 120 ms    QRS Duration 78 ms    Q-T Interval 332 ms    QTc Calculation (Bazett) 436 ms    P Axis 3 degrees    R Axis 59 degrees    T Axis 39 degrees   Urinalysis with Microscopic Klor-Con  Patient received 2 g magnesium sulfate in ED, will order additional 2 g of magnesium sulfate  Continue to monitor    Elevated troponins  Troponin 23 in the emergency department, patient asymptomatic and EKG without signs of ischemic change  Repeat troponin, continue to monitor clinical status    Constipation  As needed GlycoLax daily, scheduled Senokot-S daily  Continue to monitor    Mood disorder  Patient on home regimen of: Cymbalta 60 mg daily, Seroquel 200 mg nightly, Klonopin 0.5 mg twice daily as needed, Adderall 20 mg 3 times daily  Continue home Klonopin as patient appears anxious and is going to ICU, continue to monitor and consider ordering other home mood medications if patient is experiencing mood disturbance    History of diabetes  Patient only on Ozempic at home, patient states last A1c was 5.2 approximately 3 weeks ago, will order low-dose sliding scale with hypoglycemia orders, continue to monitor, consider long-acting insulin if sugars are elevated        Pain Control:  Tylenol 650 mg Q6HR PRN for mild pain  DVT ppx:  Lovenox 30 mg daily  GI ppx:  Pepcid 20 mg daily  Code Status: Full  Diet: General diet      Case discussed with attending, Dr. Dewitt Babinski, MD   Family Medicine Resident Physician PGY-1  11/16/2022

## 2022-11-17 LAB
ALBUMIN SERPL-MCNC: 2.6 G/DL (ref 3.5–5.2)
ALP BLD-CCNC: 81 U/L (ref 40–129)
ALT SERPL-CCNC: 68 U/L (ref 0–40)
ANION GAP SERPL CALCULATED.3IONS-SCNC: 11 MMOL/L (ref 7–16)
AST SERPL-CCNC: 74 U/L (ref 0–39)
BASOPHILS ABSOLUTE: 0.03 E9/L (ref 0–0.2)
BASOPHILS RELATIVE PERCENT: 0.2 % (ref 0–2)
BILIRUB SERPL-MCNC: 0.7 MG/DL (ref 0–1.2)
BUN BLDV-MCNC: 30 MG/DL (ref 6–20)
C-REACTIVE PROTEIN: 17.9 MG/DL (ref 0–0.4)
C-REACTIVE PROTEIN: 21.1 MG/DL (ref 0–0.4)
CALCIUM SERPL-MCNC: 7.9 MG/DL (ref 8.6–10.2)
CHLORIDE BLD-SCNC: 104 MMOL/L (ref 98–107)
CHLORIDE URINE RANDOM: 22 MMOL/L
CO2: 23 MMOL/L (ref 22–29)
CREAT SERPL-MCNC: 1.5 MG/DL (ref 0.7–1.2)
CREATININE URINE: 158 MG/DL (ref 40–278)
EOSINOPHIL, URINE: 0 % (ref 0–1)
EOSINOPHILS ABSOLUTE: 0.04 E9/L (ref 0.05–0.5)
EOSINOPHILS RELATIVE PERCENT: 0.3 % (ref 0–6)
GFR SERPL CREATININE-BSD FRML MDRD: 53 ML/MIN/1.73
GLUCOSE BLD-MCNC: 102 MG/DL (ref 74–99)
HCT VFR BLD CALC: 29.9 % (ref 37–54)
HEMOGLOBIN: 9.9 G/DL (ref 12.5–16.5)
IMMATURE GRANULOCYTES #: 0.09 E9/L
IMMATURE GRANULOCYTES %: 0.7 % (ref 0–5)
LACTIC ACID: 1.2 MMOL/L (ref 0.5–2.2)
LYMPHOCYTES ABSOLUTE: 0.84 E9/L (ref 1.5–4)
LYMPHOCYTES RELATIVE PERCENT: 6.2 % (ref 20–42)
MAGNESIUM: 2.4 MG/DL (ref 1.6–2.6)
MCH RBC QN AUTO: 30.7 PG (ref 26–35)
MCHC RBC AUTO-ENTMCNC: 33.1 % (ref 32–34.5)
MCV RBC AUTO: 92.6 FL (ref 80–99.9)
METER GLUCOSE: 122 MG/DL (ref 74–99)
METER GLUCOSE: 71 MG/DL (ref 74–99)
METER GLUCOSE: 86 MG/DL (ref 74–99)
METER GLUCOSE: 87 MG/DL (ref 74–99)
METER GLUCOSE: 94 MG/DL (ref 74–99)
MICROALBUMIN UR-MCNC: 273.8 MG/L
MICROALBUMIN/CREAT UR-RTO: 173.3 (ref 0–30)
MONOCYTES ABSOLUTE: 1.23 E9/L (ref 0.1–0.95)
MONOCYTES RELATIVE PERCENT: 9.1 % (ref 2–12)
NEUTROPHILS ABSOLUTE: 11.34 E9/L (ref 1.8–7.3)
NEUTROPHILS RELATIVE PERCENT: 83.5 % (ref 43–80)
OSMOLALITY URINE: 524 MOSM/KG (ref 300–900)
PDW BLD-RTO: 14.1 FL (ref 11.5–15)
PHOSPHORUS: 3.3 MG/DL (ref 2.5–4.5)
PLATELET # BLD: 167 E9/L (ref 130–450)
PMV BLD AUTO: 9.7 FL (ref 7–12)
POTASSIUM SERPL-SCNC: 3.8 MMOL/L (ref 3.5–5)
POTASSIUM, UR: 54.7 MMOL/L
RBC # BLD: 3.23 E12/L (ref 3.8–5.8)
SEDIMENTATION RATE, ERYTHROCYTE: 70 MM/HR (ref 0–15)
SODIUM BLD-SCNC: 138 MMOL/L (ref 132–146)
SODIUM URINE: 31 MMOL/L
TOTAL PROTEIN: 5.4 G/DL (ref 6.4–8.3)
UREA NITROGEN, UR: 767 MG/DL (ref 800–1666)
WBC # BLD: 13.6 E9/L (ref 4.5–11.5)

## 2022-11-17 PROCEDURE — 84100 ASSAY OF PHOSPHORUS: CPT

## 2022-11-17 PROCEDURE — 2580000003 HC RX 258

## 2022-11-17 PROCEDURE — 86140 C-REACTIVE PROTEIN: CPT

## 2022-11-17 PROCEDURE — 82962 GLUCOSE BLOOD TEST: CPT

## 2022-11-17 PROCEDURE — 6370000000 HC RX 637 (ALT 250 FOR IP)

## 2022-11-17 PROCEDURE — 1200000000 HC SEMI PRIVATE

## 2022-11-17 PROCEDURE — 6370000000 HC RX 637 (ALT 250 FOR IP): Performed by: FAMILY MEDICINE

## 2022-11-17 PROCEDURE — 80053 COMPREHEN METABOLIC PANEL: CPT

## 2022-11-17 PROCEDURE — 36592 COLLECT BLOOD FROM PICC: CPT

## 2022-11-17 PROCEDURE — 6360000002 HC RX W HCPCS

## 2022-11-17 PROCEDURE — 87205 SMEAR GRAM STAIN: CPT

## 2022-11-17 PROCEDURE — 82436 ASSAY OF URINE CHLORIDE: CPT

## 2022-11-17 PROCEDURE — 85651 RBC SED RATE NONAUTOMATED: CPT

## 2022-11-17 PROCEDURE — 84300 ASSAY OF URINE SODIUM: CPT

## 2022-11-17 PROCEDURE — 85025 COMPLETE CBC W/AUTO DIFF WBC: CPT

## 2022-11-17 PROCEDURE — 84540 ASSAY OF URINE/UREA-N: CPT

## 2022-11-17 PROCEDURE — 82044 UR ALBUMIN SEMIQUANTITATIVE: CPT

## 2022-11-17 PROCEDURE — 2580000003 HC RX 258: Performed by: INTERNAL MEDICINE

## 2022-11-17 PROCEDURE — 83605 ASSAY OF LACTIC ACID: CPT

## 2022-11-17 PROCEDURE — 99233 SBSQ HOSP IP/OBS HIGH 50: CPT | Performed by: FAMILY MEDICINE

## 2022-11-17 PROCEDURE — 2700000000 HC OXYGEN THERAPY PER DAY

## 2022-11-17 PROCEDURE — 6360000002 HC RX W HCPCS: Performed by: NURSE PRACTITIONER

## 2022-11-17 PROCEDURE — 83935 ASSAY OF URINE OSMOLALITY: CPT

## 2022-11-17 PROCEDURE — 84133 ASSAY OF URINE POTASSIUM: CPT

## 2022-11-17 PROCEDURE — 36415 COLL VENOUS BLD VENIPUNCTURE: CPT

## 2022-11-17 PROCEDURE — 83735 ASSAY OF MAGNESIUM: CPT

## 2022-11-17 PROCEDURE — 82570 ASSAY OF URINE CREATININE: CPT

## 2022-11-17 PROCEDURE — 2580000003 HC RX 258: Performed by: FAMILY MEDICINE

## 2022-11-17 RX ORDER — POTASSIUM CHLORIDE 29.8 MG/ML
20 INJECTION INTRAVENOUS
Status: COMPLETED | OUTPATIENT
Start: 2022-11-17 | End: 2022-11-17

## 2022-11-17 RX ORDER — ENOXAPARIN SODIUM 100 MG/ML
40 INJECTION SUBCUTANEOUS DAILY
Status: DISCONTINUED | OUTPATIENT
Start: 2022-11-18 | End: 2022-11-19 | Stop reason: HOSPADM

## 2022-11-17 RX ORDER — DIMETHICONE, OXYBENZONE, AND PADIMATE O 2; 2.5; 6.6 G/100G; G/100G; G/100G
STICK TOPICAL PRN
Status: DISCONTINUED | OUTPATIENT
Start: 2022-11-17 | End: 2022-11-19 | Stop reason: HOSPADM

## 2022-11-17 RX ADMIN — SODIUM CHLORIDE, POTASSIUM CHLORIDE, SODIUM LACTATE AND CALCIUM CHLORIDE: 600; 310; 30; 20 INJECTION, SOLUTION INTRAVENOUS at 13:33

## 2022-11-17 RX ADMIN — SODIUM CHLORIDE, POTASSIUM CHLORIDE, SODIUM LACTATE AND CALCIUM CHLORIDE: 600; 310; 30; 20 INJECTION, SOLUTION INTRAVENOUS at 05:06

## 2022-11-17 RX ADMIN — POTASSIUM CHLORIDE 20 MEQ: 29.8 INJECTION, SOLUTION INTRAVENOUS at 01:08

## 2022-11-17 RX ADMIN — CEFEPIME 2000 MG: 2 INJECTION, POWDER, FOR SOLUTION INTRAVENOUS at 23:14

## 2022-11-17 RX ADMIN — POTASSIUM CHLORIDE 20 MEQ: 29.8 INJECTION, SOLUTION INTRAVENOUS at 06:49

## 2022-11-17 RX ADMIN — CEFEPIME 2000 MG: 2 INJECTION, POWDER, FOR SOLUTION INTRAVENOUS at 11:40

## 2022-11-17 RX ADMIN — SODIUM CHLORIDE, PRESERVATIVE FREE 10 ML: 5 INJECTION INTRAVENOUS at 08:26

## 2022-11-17 RX ADMIN — POTASSIUM CHLORIDE 20 MEQ: 29.8 INJECTION, SOLUTION INTRAVENOUS at 06:02

## 2022-11-17 RX ADMIN — SODIUM CHLORIDE, PRESERVATIVE FREE 5 ML: 5 INJECTION INTRAVENOUS at 21:35

## 2022-11-17 RX ADMIN — FAMOTIDINE 20 MG: 20 TABLET ORAL at 08:26

## 2022-11-17 RX ADMIN — ACETAMINOPHEN 650 MG: 325 TABLET ORAL at 04:15

## 2022-11-17 RX ADMIN — ENOXAPARIN SODIUM 30 MG: 100 INJECTION SUBCUTANEOUS at 08:26

## 2022-11-17 ASSESSMENT — PAIN SCALES - GENERAL
PAINLEVEL_OUTOF10: 2
PAINLEVEL_OUTOF10: 2
PAINLEVEL_OUTOF10: 0

## 2022-11-17 ASSESSMENT — ENCOUNTER SYMPTOMS
NAUSEA: 0
ABDOMINAL PAIN: 0
CHEST TIGHTNESS: 0
SHORTNESS OF BREATH: 0
EYE REDNESS: 0
VOMITING: 0
COLOR CHANGE: 0

## 2022-11-17 NOTE — PROGRESS NOTES
Critical Care Team - Daily Progress Note         Date and time: 11/17/2022 1:17 PM  Patient's name:  Maria A Whyte  Medical Record Number: [id number]  Patient's account/billing number: [de-identified]  Patient's YOB: 1965  Age: 61 y.o. Date of Admission: 11/16/2022 11:26 AM  Length of stay during current admission: 1      Primary Care Physician: Mukul Seals  ICU Attending Physician: Dr. Jaylan Alicia    Code Status: Full Code    Reason for ICU admission: urosepsis      SUBJECTIVE:     BRIEF HISTORY:   The patient is a 61 y.o. male with significant past medical history of diabetes, and kidney stones with recent ureteral stent placed last Thursday procedure at [facility] clinic presents with Flank Pain (Left, had stent placed last week, states \"I'm getting worse\" )  He presents from home to the emergency department for evaluation of fevers, vomiting and malaise. He developed kidney stones 10 days ago, and left ureteral stent placed then last Thursday. Patient was discharged from the hospital on Saturday and his symptoms started on Monday. Fevers have been progressing gradually with 104 overnight. No aggravating or alleviating factors nor palliative measures taken for the fever. Patient states he had 2 episodes of nonbilious vomiting a day. His urologist prescribed him Zofran yesterday which alleviated his symptoms. Patient reports he has decreased oral intake due to his symptoms. Patient admit to the following: tension headache, malaise, and left flank pain, and dysuria  Patient denies the following: Chest pain, cough, shortness of breath, leg swelling, joint erythema/tenderness, sick contacts, current antibiotic use. Upon arrival to the emergency department he was found to be hypotensive, febrile and tachycardic.   Labs are remarkable for hypokalemia, hypomagnesemia,  lactic 2.3, Pro-Kamar 24.1, , ALT 37, T bili 1.4,   Creatinine 1.9-Baseline is 1  CT abdomen pelvis notable for good positioning of left double-J stent with no evidence of left ureteral stones. In the emergency department sepsis protocol was initiated. He was given fluids with no improvement in blood pressure. A central line was placed with initiation of Levophed. OVERNIGHT EVENTS:    22: Rested well overnight, he is feeling greatly improved compared to when he came in yesterday. He is off of pressors. Urology does not believe that he needs any meds at this time and will continue antibiotics per infectious disease recommendations.     CURRENT VENTILATION STATUS:     [] Ventilator  [] BIPAP  [x] Nasal Cannula [] Room Air      SEDATION:  RAAS Score:  [] Propofol gtt  [] Versed gtt  [] Ativan gtt   [x] No Sedation    PARALYZED:  [x] No    [] Yes      VASOPRESSORS:  [x] No    [] Yes    If yes -   [] Levophed       [] Dopamine     [] Vasopressin       [] Dobutamine  [] Phenylephrine         [] Epinephrine    CENTRAL LINES:     [] No   [x] Yes   (Date of Insertion:   )           If yes -     [x] Right IJ     [] Left IJ [] Right Femoral [] Left Femoral                   [] Right Subclavian [] Left Subclavian       PHILLIPS'S CATHETER:   [x] No   [] Yes  (Date of Insertion:   )     URINE OUTPUT:            [x] Good   [] Low              [] Anuric      OBJECTIVE:     VITAL SIGNS:  /84   Pulse 79   Temp 98.4 °F (36.9 °C) (Oral)   Resp 20   Ht 6' 3\" (1.905 m)   Wt 236 lb 5.3 oz (107.2 kg)   SpO2 100%   BMI 29.54 kg/m²   Tmax over 24 hours:  Temp (24hrs), Av.3 °F (36.8 °C), Min:97.7 °F (36.5 °C), Max:98.9 °F (37.2 °C)      Patient Vitals for the past 6 hrs:   BP Temp Temp src Pulse Resp SpO2   22 1200 133/84 98.4 °F (36.9 °C) Oral 79 20 100 %   22 1100 114/76 -- -- 77 20 97 %   22 1000 110/73 -- -- 75 22 90 %   22 0945 -- -- -- 79 20 (!) 86 %   22 0900 107/70 -- -- 79 16 93 %   22 0800 109/78 98.4 °F (36.9 °C) Oral 80 20 97 %         Intake/Output Summary (Last 24 hours) at 11/17/2022 1317  Last data filed at 11/17/2022 1100  Gross per 24 hour   Intake 1646.12 ml   Output 1150 ml   Net 496.12 ml     Wt Readings from Last 2 Encounters:   11/17/22 236 lb 5.3 oz (107.2 kg)   11/08/22 225 lb (102.1 kg)     Body mass index is 29.54 kg/m². PHYSICAL EXAMINATION:  General:  Awake, alert, oriented X 3. Well developed, well nourished, well groomed. No apparent distress. HEENT:  Normocephalic, atraumatic. Pupils equal, round, reactive to light. No scleral icterus. No conjunctival injection. No nasal discharge. Neck:  Supple, without stridor, no JVD  Heart:  RRR, no murmurs, gallops, rubs, carotid upstroke normal, no carotid bruits  Lungs:  CTA bilaterally, bilat symmetrical expansion, non-labored, no wheeze, rales, or rhonchi  Abdomen:   Bowel sounds present, soft, non-tender, non-distended, no guarding or rigidity  Extremities:  No clubbing, cyanosis, no edema  Skin:  Warm and dry, no lesions noted  Neuro:  Cranial nerves 2-12 grossly intact, no focal deficits  Breast: deferred  Rectal: deferred  Genitalia:  deferred     MEDICATIONS:    Scheduled Meds:   [START ON 11/18/2022] enoxaparin  40 mg SubCUTAneous Daily    sennosides-docusate sodium  2 tablet Oral Daily    famotidine  20 mg Oral Daily    cefepime  2,000 mg IntraVENous Q12H    sodium chloride flush  5-40 mL IntraVENous 2 times per day    insulin lispro  0-4 Units SubCUTAneous TID WC    insulin lispro  0-4 Units SubCUTAneous Nightly     Continuous Infusions:   sodium chloride      lactated ringers 125 mL/hr at 11/17/22 0506     PRN Meds:   ondansetron, 4 mg, Q8H PRN   Or  ondansetron, 4 mg, Q6H PRN  polyethylene glycol, 17 g, Daily PRN  clonazePAM, 0.5 mg, Q12H PRN  glucose, 4 tablet, PRN  dextrose bolus, 125 mL, PRN   Or  dextrose bolus, 250 mL, PRN  glucagon (rDNA), 1 mg, PRN  sodium chloride flush, 5-40 mL, PRN  sodium chloride, , PRN  acetaminophen, 650 mg, Q6H PRN   Or  acetaminophen, 650 mg, Q6H PRN        VENT SETTINGS (Comprehensive) (if applicable): Additional Respiratory Assessments  Heart Rate: 79  Resp: 20  SpO2: 100 %    ABGs:   No results for input(s): PH, PCO2, PO2, HCO3, BE, O2SAT in the last 72 hours. Laboratory findings:    Complete Blood Count:   Recent Labs     11/16/22 1154 11/17/22 0332   WBC 20.0* 13.6*   HGB 12.4* 9.9*   HCT 37.1 29.9*    167        Last 3 Blood Glucose:   Recent Labs     11/16/22 1154 11/16/22 2145 11/17/22 0332   GLUCOSE 121* 122* 102*        PT/INR:  No results found for: PROTIME, INR  PTT:  No results found for: APTT, PTT    Comprehensive Metabolic Profile:   Recent Labs     11/16/22 1154 11/16/22 2145 11/17/22 0332    136 138   K 2.9* 3.7 3.8   CL 95* 104 104   CO2 25 24 23   BUN 24* 26* 30*   CREATININE 1.9* 1.5* 1.5*   GLUCOSE 121* 122* 102*   CALCIUM 8.4* 7.7* 7.9*   PROT 6.0*  --  5.4*   LABALBU 2.9*  --  2.6*   BILITOT 1.4*  --  0.7   ALKPHOS 100  --  81   *  --  74*   ALT 66*  --  68*      Magnesium:   Lab Results   Component Value Date/Time    MG 2.4 11/17/2022 03:32 AM     Phosphorus:   Lab Results   Component Value Date/Time    PHOS 3.3 11/17/2022 03:32 AM     Ionized Calcium: No results found for: CAION     Urinalysis: kristian UTI    Troponin: No results for input(s): TROPONINI in the last 72 hours. Microbiology:  Cultures during this admission: PENDING    Blood cultures:                 [] None drawn      [] Negative             []  Positive (Details:  )  Urine Culture:                   [] None drawn      [] Negative             []  Positive (Details:  )  Sputum Culture:               [] None drawn       [] Negative             []  Positive (Details:  )   Endotracheal aspirate:     [] None drawn       [] Negative             []  Positive (Details:  )     Radiology/Imaging:   CT A/P 11/16:  1. Left double-J stent in place with no evidence of left urolithiasis. There   is minimal left hydronephrosis with left perirenal and periureteral stranding. 2. There is mild fat stranding adjacent to the urinary bladder and prostate   gland with enlargement of the prostate gland. Consider prostatitis or   cystitis. 3. Right nonobstructive nephrolithiasis. 4. Possible constipation. 5. Fatty liver. Chest x-ray:  22: no cardiopulmonary findings    EK22:   Sinus tachycardia  Otherwise normal ECG  When compared with ECG of 2021 14:38,  ST now depressed in Inferior leads    ASSESSMENT:     Principal Problem:    Septic shock ([address]  Active Problems:    Hypomagnesemia    Ureteral stent present    Hypokalemia    Sepsis ([facility] [address]  Resolved Problems:    * No resolved hospital problems. *    PLAN:     WEAN PER PROTOCOL:  [] No   [x] Yes  [] N/A    DISCONTINUE ANY LABS:   [x] No   [] Yes    ICU PROPHYLAXIS:  Stress ulcer:  [x] PPI Agent  [] [id number] [] Sucralfate  [] Other:  VTE:   [] Enoxaparin  [] Unfract. Heparin Subcut  [] EPC Cuffs    NUTRITION:  [] NPO [] Tube Feeding (Specify: ) [] TPN  [x] PO (Diet: ADULT DIET; Regular)    HOME MEDICATIONS RECONCILED: [] No  [x] Yes    INSULIN DRIP:   [x] No   [] Yes    CONSULTATION NEEDED:  [] No   [x] Yes    FAMILY UPDATED:    [] No   [x] Yes    TRANSFER OUT OF ICU:   [] No   [x] Yes    ADDITIONAL PLAN:    ASSESSMENT / PLAN:   Neuro:    No acute issues    CV:   Septic shock secondary to complicated UTI  -Off vasopressors  -Continue  mL/h  -Currently hemodynamically stable, plan for transfer out of the ICU    Pulm:    Acute hypoxia  -On supplemental nasal cannula  -Wean as able to maintain saturation 90 to 92%    GI:   Transaminitis  -Trending down  -No abdominal tenderness  -Tolerating diet    Renal:   Complicated UTI s/p ureteral stent placement on 11/10  -Believes he passed a kidney stone on   -Urology has been consulted, appreciate recommendations.   No need for urological intervention at this time.  -Urine output remaining good    TOSHIA  -Presentation creatinine was 1.9, baseline 1.0  -On lactated Ringer's 125 mL/h  -Creatinine improving to 1.5 today  -Monitor electrolytes and replace as needed/able    ID:     Complicated urinary tract infection  -Infectious disease consult appreciated  -Elevated inflammatory markers, continue to trend  -Cultures pending  -Continue cefepime    Endocrine:   History of diabetes  -Low-dose SSI, maintain BG less than 180    MSK:    No acute issues    Heme:    No acute issues, monitor H&H      Pain/Analgesia: tylenol  Bowel regimen: GlycoLax  Diet: ADULT DIET; Regular  DVT proph: Lovenox  GI proph: Pepcid  Seizure proph: None  Glucose protocol: Low-dose SSI  Mouth/eye care: As needed  Gerard: None  CVC sites: [place] 11/16  Ancillary consults: Urology, infectious disease  Family Update: As able  CODE Status: Full Code    Dispo: ICU      Gaudencio Nuñez DO  Resident, PGY-2  11/17/2022  1:17 PM      I personally saw, examined and provided care for the patient. Radiographs, labs and medication list were reviewed by me independently. Review of Residents documentation was conducted and revisions were made as appropriate. I agree with the above documented exam, problem list and plan of care.         CCT excluding procedures 32 minutes    Abi Ponce DO

## 2022-11-17 NOTE — CONSULTS
[address] [place] [address] [facility]  Consultation Note     Admit Date: 11/16/2022 11:26 AM    Reason for Consult:   Urosepsis     Attending Physician:  Nik Vargas MD    HISTORY OF PRESENT ILLNESS:             The history is obtained from extensive review of available past medical records. The patient is a 61 y.o. male who is not known to the ID service. The patient presented to the ED at [facility] on 11/8/2022 complaining of left-sided flank pain x3 days. The patient has been treated at [place] for kidney stones and had her ureteral stent. Who presented to the ED at [facility] on 11/16/2022 complaining of a temperature 104 °F, flank pain and dysuria. On presentation he was afebrile but tachycardic and later dropped his blood pressure. White count was 20. Transaminases were elevated. Creatinine was 1.9. CRP was 21.1. ESR was 70. CT showed a left ureteral stent, perinephric stranding and possible prostatitis. He was treated with Cefepime and admitted to the ICU and placed on vasopressors. He was seen by urology. They were not planning for any interventions and stated he would need follow-up in [place]. He has an appointment with urology in a couple weeks. Vasopressors have been discontinued this morning. He is feeling better. He says he passed a stone yesterday because he is no longer having the flank discomfort. No hematuria but admitted to having some urgency.     Past Medical History:        Diagnosis Date    Anxiety     Depression     Diabetes mellitus ([address]     Fibromyalgia     Left retinal detachment     Metabolic syndrome     Obesity      Past Surgical History:        Procedure Laterality Date    CHOLECYSTECTOMY      RETINAL DETACHMENT SURGERY Left 8/28/2020    LEFT EYE PARS PLANA VITRECTOMY 25 GAUGE RETINAL DETACHMENT REPAIR LASER AND GAS FLUID EXCHANGE [facility]) performed by Abner Wilkinson MD at [address] [address]    VASECTOMY  2004 Current Medications:   Scheduled Meds:   enoxaparin  30 mg SubCUTAneous Daily    sennosides-docusate sodium  2 tablet Oral Daily    famotidine  20 mg Oral Daily    cefepime  2,000 mg IntraVENous Q12H    sodium chloride flush  5-40 mL IntraVENous 2 times per day    insulin lispro  0-4 Units SubCUTAneous TID     insulin lispro  0-4 Units SubCUTAneous Nightly     Continuous Infusions:   norepinephrine Stopped (11/16/22 3418)    sodium chloride      lactated ringers 125 mL/hr at 11/17/22 0506     PRN Meds:ondansetron **OR** ondansetron, polyethylene glycol, clonazePAM, glucose, dextrose bolus **OR** dextrose bolus, glucagon (rDNA), sodium chloride flush, sodium chloride, acetaminophen **OR** acetaminophen    Allergies:  Statins    Social History:   Social History     Socioeconomic History    Marital status:    Tobacco Use    Smoking status: Never    Smokeless tobacco: Never   Vaping Use    Vaping Use: Never used   Substance and Sexual Activity    Alcohol use: No    Drug use: No      Pets: Cats  Travel: No  The patient retired from doing tax audits    Family History:       Family history unknown: Yes   . Otherwise non-pertinent to the chief complaint. REVIEW OF SYSTEMS:    Constitutional: Positive for fevers, chills, diaphoresis  Neurologic: Negative   Psychiatric: Negative  Rheumatologic: Negative   Endocrine: Negative  Hematologic: Negative  Immunologic: Negative  ENT: Negative  Respiratory: Negative   Cardiovascular: Negative  GI: Negative  : As in the HPI  Musculoskeletal: Negative  Skin: No rashes. PHYSICAL EXAM:    Vitals:   /69   Pulse 78   Temp 98.9 °F (37.2 °C) (Oral)   Resp 22   Ht 6' 3\" (1.905 m)   Wt 236 lb 5.3 oz (107.2 kg)   SpO2 93%   BMI 29.54 kg/m²   Constitutional: The patient is awake, alert, and oriented. Lying in bed in the ICU. He is in no distress. Wife and visitor present  Skin: Warm and dry. No rashes were noted. HEENT: Eyes show round, and reactive pupils. No jaundice. Moist mucous membranes, no ulcerations, no thrush. Neck: Supple to movements. No lymphadenopathy. Chest: No use of accessory muscles to breathe. Symmetrical expansion. Auscultation reveals no wheezing, crackles, or rhonchi. Cardiovascular: S1 and S2 are rhythmic and regular. No murmurs appreciated. Abdomen: Positive bowel sounds to auscultation. Benign to palpation. No masses felt. No hepatosplenomegaly. Extremities: No clubbing, no cyanosis, no edema. Lines: Right IJ TLC 11/16/2022.     CBC+dif:  Recent Labs     11/16/22  1154 11/17/22  0332   WBC 20.0* 13.6*   HGB 12.4* 9.9*   HCT 37.1 29.9*   MCV 90.3 92.6    167   NEUTROABS 19.00* 11.34*     Lab Results   Component Value Date    CRP 21.1 (H) 11/17/2022      No results found for: CRPHS  No results found for: SEDRATE  Lab Results   Component Value Date    ALT 68 (H) 11/17/2022    AST 74 (H) 11/17/2022    ALKPHOS 81 11/17/2022    BILITOT 0.7 11/17/2022     Lab Results   Component Value Date/Time     11/17/2022 03:32 AM    K 3.8 11/17/2022 03:32 AM    K 3.5 06/13/2021 03:00 AM     11/17/2022 03:32 AM    CO2 23 11/17/2022 03:32 AM    BUN 30 11/17/2022 03:32 AM    CREATININE 1.5 11/17/2022 03:32 AM    GFRAA >60 06/13/2021 03:00 AM    LABGLOM 53 11/17/2022 03:32 AM    GLUCOSE 102 11/17/2022 03:32 AM    PROT 5.4 11/17/2022 03:32 AM    LABALBU 2.6 11/17/2022 03:32 AM    CALCIUM 7.9 11/17/2022 03:32 AM    BILITOT 0.7 11/17/2022 03:32 AM    ALKPHOS 81 11/17/2022 03:32 AM    AST 74 11/17/2022 03:32 AM    ALT 68 11/17/2022 03:32 AM       No results found for: PROTIME, INR    Lab Results   Component Value Date/Time    TSH 2.420 11/16/2022 11:54 AM       Lab Results   Component Value Date/Time    COLORU Yellow 11/16/2022 05:00 PM    PHUR 6.0 11/16/2022 05:00 PM    WBCUA 10-20 11/16/2022 05:00 PM    RBCUA >20 11/16/2022 05:00 PM    MUCUS Present 11/16/2022 05:00 PM    BACTERIA MODERATE 11/16/2022 05:00 PM    CLARITYU Clear 11/16/2022 05:00 PM    SPECGRAV 1.020 11/16/2022 05:00 PM    LEUKOCYTESUR LARGE 11/16/2022 05:00 PM    UROBILINOGEN 0.2 11/16/2022 05:00 PM    BILIRUBINUR SMALL 11/16/2022 05:00 PM    BLOODU LARGE 11/16/2022 05:00 PM    GLUCOSEU Negative 11/16/2022 05:00 PM       No results found for: HCO3, BE, O2SAT, PH, THGB, PCO2, PO2, TCO2  Radiology:  Reviewed    Microbiology:  Pending  No results for input(s): BC in the last 72 hours. No results for input(s): ORG in the last 72 hours. No results for input(s): Mily Anita in the last 72 hours. No results for input(s): STREPNEUMAGU in the last 72 hours. No results for input(s): LP1UAG in the last 72 hours. No results for input(s): ASO in the last 72 hours. No results for input(s): CULTRESP in the last 72 hours. Recent Labs     11/16/22  1154   PROCAL 24.81*       Assessment:  Status post cystoscopy with placement of left ureteral stent 11/11/2022 in [facility] [employer]. He was on IV antibiotics here but was not discharged on any antibiotics. We do not have any cultures from that admission  Complicated UTI with sepsis secondary to urolithiasis and retained stent  Possible prostatitis  Sepsis with septic shock secondary to complicated UTI  Fever secondary to complicated UTI-improving  Leukocytosis secondary to complicated UTI-improving    Plan:    Continue Cefepime  Check cultures  Transfer out of the ICU  Will follow with you    Thank you for having us see this patient in consultation. I will be discussing this case with the treating physicians. Spoke with nursing. Spoke with wife.     Michael Rohce MD  7:49 AM  11/17/2022

## 2022-11-17 NOTE — PROGRESS NOTES
PROGRESS NOTE    Chief Complaint:   Left ureteral stone/UTI with sepsis/BPH/Right nephrolithiasis    HPI:   He is feeling much better. He denies any pain. He is voiding comfortably. He denies hematuria or dysuria.   His wife is at his bedside    Vitals:    11/17/22 1600   BP: 138/86   Pulse: 95   Resp: 20   Temp: 99 °F (37.2 °C)   SpO2: 96%       Allergies: Statins    PAST MEDICAL HISTORY:   Past Medical History:   Diagnosis Date    Anxiety     Depression     Diabetes mellitus ([address]     Fibromyalgia     Left retinal detachment     Metabolic syndrome     Obesity        PAST SURGICAL HISTORY:   Past Surgical History:   Procedure Laterality Date    CHOLECYSTECTOMY      RETINAL DETACHMENT SURGERY Left 8/28/2020    LEFT EYE PARS PLANA VITRECTOMY 25 GAUGE RETINAL DETACHMENT REPAIR LASER AND GAS FLUID EXCHANGE (SF6) performed by Ivy Hassan MD at [address] [place] [address]  2015    VASECTOMY  2004        PAST FAMILY HISTORY:    Family History   Family history unknown: Yes       PAST SOCIAL HISTORY:    Social History     Socioeconomic History    Marital status:    Tobacco Use    Smoking status: Never    Smokeless tobacco: Never   Vaping Use    Vaping Use: Never used   Substance and Sexual Activity    Alcohol use: No    Drug use: No       IV:    sodium chloride         PRN: medicated lip balm, ondansetron **OR** ondansetron, polyethylene glycol, clonazePAM, glucose, dextrose bolus **OR** dextrose bolus, glucagon (rDNA), sodium chloride flush, sodium chloride, acetaminophen **OR** acetaminophen    Scheduled:    [START ON 11/18/2022] enoxaparin  40 mg SubCUTAneous Daily    sennosides-docusate sodium  2 tablet Oral Daily    famotidine  20 mg Oral Daily    cefepime  2,000 mg IntraVENous Q12H    sodium chloride flush  5-40 mL IntraVENous 2 times per day    insulin lispro  0-4 Units SubCUTAneous TID     insulin lispro  0-4 Units SubCUTAneous Nightly       Lab Results   Component Value Date/Time     11/17/2022 03:32 AM    K 3.8 11/17/2022 03:32 AM    K 3.5 06/13/2021 03:00 AM    BUN 30 11/17/2022 03:32 AM    CREATININE 1.5 11/17/2022 03:32 AM        Lab Results   Component Value Date/Time    HGB 9.9 11/17/2022 03:32 AM    HCT 29.9 11/17/2022 03:32 AM       Lab Results   Component Value Date    PSA 1.46 12/17/2019    PSA 1.25 12/20/2018    PSA see note (AA) 12/05/2017    PSA 1.35 12/05/2017       Review Of Systems:  Constitutional: Tired  Eyes: negative  Ears, nose, mouth, throat, and face: negative  Respiratory: negative  Cardiovascular: negative  Gastrointestinal: negative  Genitourinary: Stones  Musculoskeletal: Fibromyalgia  Neurological: negative  Behavioral/Psych: Anxiety  Endocrine: Diabetes    Physical Exam:  Skin is dry, and without rashes  Respirations are non-labored, intact  Abdomen is soft, non-tender, non-distended. Active bowel sounds are present. No rebound or guarding  Alert and oriented x 3. No focal motor/sensory deficits    Assessment and Plan:  POD#7--S/P Cysto/Left ureteroscopy/Left stent insertion (Dr. Savannah Hollis)  -CT scan on 11/16/2022 showing a left well-placed ureteral stent without evidence of residual stone debris within the urinary tract aside from a punctate right lower pole renal calculus. There are bilateral simple renal cysts, and a fatty liver.  -Urine cultures pending.   Continue antibiotics per infectious disease recommendations (Dr. Baron Knox)  -Tiny right renal stone is of no clinical concern at this time and will be monitored and managed nonoperatively  -Cystoscopy and stent removal to be performed in the outpatient setting in our office after resolution of his acute infection  -We will follow him during his hospital stay    Ced Galan MD  11/17/2022  5:15 PM

## 2022-11-17 NOTE — PLAN OF CARE
Problem: Discharge Planning  Goal: Discharge to home or other facility with appropriate resources  11/17/2022 1710 by Miguel Dove RN  Outcome: Progressing  11/17/2022 1706 by Miguel Dove RN  Outcome: Progressing     Problem: Pain  Goal: Verbalizes/displays adequate comfort level or baseline comfort level  11/17/2022 1710 by Miguel Dove RN  Outcome: Progressing  11/17/2022 1706 by Miguel Dove RN  Outcome: Progressing     Problem: Safety - Adult  Goal: Free from fall injury  11/17/2022 1710 by Miguel Dove RN  Outcome: Progressing  11/17/2022 1706 by Miguel Dove RN  Outcome: Progressing     Problem: Skin/Tissue Integrity  Goal: Absence of new skin breakdown  Description: 1. Monitor for areas of redness and/or skin breakdown  2. Assess vascular access sites hourly  3. Every 4-6 hours minimum:  Change oxygen saturation probe site  4. Every 4-6 hours:  If on nasal continuous positive airway pressure, respiratory therapy assess nares and determine need for appliance change or resting period.   11/17/2022 1710 by Miguel Dove RN  Outcome: Progressing  11/17/2022 1706 by Miguel Dove RN  Outcome: Progressing     Problem: ABCDS Injury Assessment  Goal: Absence of physical injury  11/17/2022 1710 by Miguel Dove RN  Outcome: Progressing  11/17/2022 1706 by Miguel Dove RN  Outcome: Progressing

## 2022-11-17 NOTE — PROGRESS NOTES
[facility]  Progress Note        Chief complaint :  Chief Complaint   Patient presents with    Flank Pain     Left, had stent placed last week, states \"I'm getting worse\"        Subjective:  Soraya Hollis was seen and examined resting comfortably in bed with wife present in the ICU. He states that he is feeling much better than he did when he was admitted. He is denying any acute complaints. He is not having any fever, chills, lightheadedness, dizziness, diaphoresis, chest pain or pressure, shortness of breath, abdominal pain, constipation, diarrhea, vomiting, nausea, active bleeding, leg swelling. He states that his constipation is resolved as he had a bowel movement this morning. He has been off pressors since yesterday afternoon. When seen, he does have 2 L nasal cannula in place but states that he was started on oxygen for hypoxia while sleeping but that he has not been hypoxic while awake. He does mention being told in the past that he may need a sleep study for sleep apnea but has never undergone this testing. Review of Systems   Constitutional:  Negative for chills and fever. Eyes:  Negative for redness and visual disturbance. Respiratory:  Negative for chest tightness and shortness of breath. Cardiovascular:  Negative for chest pain and palpitations. Gastrointestinal:  Negative for abdominal pain, nausea and vomiting. Endocrine: Negative for cold intolerance and heat intolerance. Genitourinary:  Negative for difficulty urinating and hematuria. Musculoskeletal:  Negative for gait problem and neck pain. Skin:  Negative for color change and rash. Neurological:  Negative for dizziness, light-headedness and headaches. Objective:  /84   Pulse 82   Temp 98.4 °F (36.9 °C) (Oral)   Resp 14   Ht 6' 3\" (1.905 m)   Wt 236 lb 5.3 oz (107.2 kg)   SpO2 94%   BMI 29.54 kg/m²     Physical Exam  Vitals reviewed.    Constitutional: Appearance: Normal appearance. He is not toxic-appearing or diaphoretic. HENT:      Head: Normocephalic and atraumatic. Right Ear: External ear normal.      Left Ear: External ear normal.      Nose: Nose normal.      Mouth/Throat:      Mouth: Mucous membranes are moist.      Pharynx: No oropharyngeal exudate. Eyes:      General: No scleral icterus. Extraocular Movements: Extraocular movements intact. Cardiovascular:      Rate and Rhythm: Normal rate and regular rhythm. Heart sounds: Normal heart sounds. No murmur heard. No friction rub. No gallop. Pulmonary:      Effort: Pulmonary effort is normal.      Breath sounds: Normal breath sounds. No wheezing or rales. Abdominal:      General: Abdomen is flat. Palpations: Abdomen is soft. There is no mass. Tenderness: There is no abdominal tenderness. There is no guarding. Musculoskeletal:         General: Normal range of motion. Cervical back: Normal range of motion and neck supple. Right lower leg: No edema. Left lower leg: No edema. Skin:     General: Skin is moist.   Neurological:      General: No focal deficit present. Mental Status: He is alert and oriented to person, place, and time. Psychiatric:         Mood and Affect: Mood normal.         Thought Content:  Thought content normal.       Labs:  Recent Results (from the past 24 hour(s))   Urinalysis with Microscopic    Collection Time: 11/16/22  5:00 PM   Result Value Ref Range    Color, UA Yellow Straw/Yellow    Clarity, UA Clear Clear    Glucose, Ur Negative Negative mg/dL    Bilirubin Urine SMALL (A) Negative    Ketones, Urine TRACE (A) Negative mg/dL    Specific Gravity, UA 1.020 1.005 - 1.030    Blood, Urine LARGE (A) Negative    pH, UA 6.0 5.0 - 9.0    Protein,  (A) Negative mg/dL    Urobilinogen, Urine 0.2 <2.0 E.U./dL    Nitrite, Urine POSITIVE (A) Negative    Leukocyte Esterase, Urine LARGE (A) Negative    Mucus, UA Present (A) None Seen /LPF    WBC, UA 10-20 (A) 0 - 5 /HPF    RBC, UA >20 0 - 2 /HPF    Bacteria, UA MODERATE (A) None Seen /HPF   Troponin    Collection Time: 11/16/22  5:35 PM   Result Value Ref Range    Troponin, High Sensitivity 18 (H) 0 - 11 ng/L   Lactic Acid    Collection Time: 11/16/22  5:35 PM   Result Value Ref Range    Lactic Acid 1.4 0.5 - 2.2 mmol/L   POCT Glucose    Collection Time: 11/16/22  9:06 PM   Result Value Ref Range    Meter Glucose 124 (H) 74 - 99 mg/dL   Basic Metabolic Panel    Collection Time: 11/16/22  9:45 PM   Result Value Ref Range    Sodium 136 132 - 146 mmol/L    Potassium 3.7 3.5 - 5.0 mmol/L    Chloride 104 98 - 107 mmol/L    CO2 24 22 - 29 mmol/L    Anion Gap 8 7 - 16 mmol/L    Glucose 122 (H) 74 - 99 mg/dL    BUN 26 (H) 6 - 20 mg/dL    Creatinine 1.5 (H) 0.7 - 1.2 mg/dL    Est, Glom Filt Rate 53 >=60 mL/min/1.73    Calcium 7.7 (L) 8.6 - 10.2 mg/dL   Magnesium    Collection Time: 11/16/22  9:45 PM   Result Value Ref Range    Magnesium 2.3 1.6 - 2.6 mg/dL   Phosphorus    Collection Time: 11/16/22  9:45 PM   Result Value Ref Range    Phosphorus 4.1 2.5 - 4.5 mg/dL   CBC with Auto Differential    Collection Time: 11/17/22  3:32 AM   Result Value Ref Range    WBC 13.6 (H) 4.5 - 11.5 E9/L    RBC 3.23 (L) 3.80 - 5.80 E12/L    Hemoglobin 9.9 (L) 12.5 - 16.5 g/dL    Hematocrit 29.9 (L) 37.0 - 54.0 %    MCV 92.6 80.0 - 99.9 fL    MCH 30.7 26.0 - 35.0 pg    MCHC 33.1 32.0 - 34.5 %    RDW 14.1 11.5 - 15.0 fL    Platelets 589 138 - 568 E9/L    MPV 9.7 7.0 - 12.0 fL    Neutrophils % 83.5 (H) 43.0 - 80.0 %    Immature Granulocytes % 0.7 0.0 - 5.0 %    Lymphocytes % 6.2 (L) 20.0 - 42.0 %    Monocytes % 9.1 2.0 - 12.0 %    Eosinophils % 0.3 0.0 - 6.0 %    Basophils % 0.2 0.0 - 2.0 %    Neutrophils Absolute 11.34 (H) 1.80 - 7.30 E9/L    Immature Granulocytes # 0.09 E9/L    Lymphocytes Absolute 0.84 (L) 1.50 - 4.00 E9/L    Monocytes Absolute 1.23 (H) 0.10 - 0.95 E9/L    Eosinophils Absolute 0.04 (L) 0.05 - 0.50 E9/L Basophils Absolute 0.03 0.00 - 0.20 E9/L   C-Reactive Protein    Collection Time: 11/17/22  3:32 AM   Result Value Ref Range    CRP 21.1 (H) 0.0 - 0.4 mg/dL   Sedimentation Rate    Collection Time: 11/17/22  3:32 AM   Result Value Ref Range    Sed Rate 70 (H) 0 - 15 mm/Hr   Comprehensive Metabolic Panel    Collection Time: 11/17/22  3:32 AM   Result Value Ref Range    Sodium 138 132 - 146 mmol/L    Potassium 3.8 3.5 - 5.0 mmol/L    Chloride 104 98 - 107 mmol/L    CO2 23 22 - 29 mmol/L    Anion Gap 11 7 - 16 mmol/L    Glucose 102 (H) 74 - 99 mg/dL    BUN 30 (H) 6 - 20 mg/dL    Creatinine 1.5 (H) 0.7 - 1.2 mg/dL    Est, Glom Filt Rate 53 >=60 mL/min/1.73    Calcium 7.9 (L) 8.6 - 10.2 mg/dL    Total Protein 5.4 (L) 6.4 - 8.3 g/dL    Albumin 2.6 (L) 3.5 - 5.2 g/dL    Total Bilirubin 0.7 0.0 - 1.2 mg/dL    Alkaline Phosphatase 81 40 - 129 U/L    ALT 68 (H) 0 - 40 U/L    AST 74 (H) 0 - 39 U/L   Magnesium    Collection Time: 11/17/22  3:32 AM   Result Value Ref Range    Magnesium 2.4 1.6 - 2.6 mg/dL   Phosphorus    Collection Time: 11/17/22  3:32 AM   Result Value Ref Range    Phosphorus 3.3 2.5 - 4.5 mg/dL   Lactic Acid    Collection Time: 11/17/22  3:32 AM   Result Value Ref Range    Lactic Acid 1.2 0.5 - 2.2 mmol/L   URINE ELECTROLYTES    Collection Time: 11/17/22  3:32 AM   Result Value Ref Range    Sodium, Ur 31 Not Established mmol/L    Potassium, Ur 54.7 Not Established mmol/L    Chloride 22 Not Established mmol/L   UREA NITROGEN, URINE    Collection Time: 11/17/22  3:32 AM   Result Value Ref Range    Urea Nitrogen, Ur 767 (L) 800 - 1666 mg/dL   Osmolality, urine    Collection Time: 11/17/22  3:32 AM   Result Value Ref Range    Osmolality, Ur 524 300 - 900 mOsm/kg   Microalbumin / creatinine urine ratio    Collection Time: 11/17/22  3:32 AM   Result Value Ref Range    Microalbumin, Random Urine 273.8 (H) Not Established mg/L    Creatinine, Ur 158 40 - 278 mg/dL    Microalbumin Creatinine Ratio 173.3 (H) 0.0 - 30.0 Eosinophil Smear, Urine    Collection Time: 11/17/22  3:32 AM   Result Value Ref Range    Eosinophil, Urine 0 0 - 1 %   POCT Glucose    Collection Time: 11/17/22  7:31 AM   Result Value Ref Range    Meter Glucose 94 74 - 99 mg/dL   POCT Glucose    Collection Time: 11/17/22 11:32 AM   Result Value Ref Range    Meter Glucose 122 (H) 74 - 99 mg/dL       Radiology and other tests reviewed:  XR CHEST PORTABLE   Final Result   Right IJ catheter in good position with no pneumothorax. Stable chest.         CT ABDOMEN PELVIS WO CONTRAST Additional Contrast? None   Final Result   1. Left double-J stent in place with no evidence of left urolithiasis. There   is minimal left hydronephrosis with left perirenal and periureteral stranding. 2. There is mild fat stranding adjacent to the urinary bladder and prostate   gland with enlargement of the prostate gland. Consider prostatitis or   cystitis. 3. Right nonobstructive nephrolithiasis. 4. Possible constipation. 5. Fatty liver.              Assessment:  Active Hospital Problems    Diagnosis Date Noted    Septic shock ([address] [A41.9, R65.21] 11/16/2022     Priority: Medium    Hypomagnesemia [E83.42] 11/16/2022     Priority: Medium    Ureteral stent present [Z96.0] 11/16/2022     Priority: Medium    Hypokalemia [E87.6] 11/16/2022     Priority: Medium    Sepsis ([address] [A41.9] 11/16/2022     Priority: Medium       Plan:    Sepsis with septic shock secondary to complicated UTI  Patient off pressor support, maintaining MAP appropriately, day 2 of cefepime  Blood and urine cultures pending  ID following, appreciate input  Plan per ICU care team, patient will likely be discharged out of the ICU as soon as general bed becomes available    Acute kidney injury, improving  Creatinine improved from 1.9-1.5  Continue lactated Ringer's at 125 an hour    Anemia  Likely secondary to hemodilution after initial hemoconcentrated labs, patient is status post 4 L bolused in the ED as well as continuous IV fluids  Patient denies active bleed, vitals hemodynamically stable, no intervention at this time, continue to monitor    Constipation, resolved  Patient with bowel movement that was nonbloody and nonmelanotic this morning  Continue as needed GlycoLax and scheduled Senokot-S    Possible KYLE  Patient was started on nasal cannula after desaturating while sleeping, does show improvement while awake. Patient endorses history of snoring, daytime somnolence, not feeling fully rested after awakening. Discussed with patient possibility of KYLE and potential need for outpatient evaluation. No interventions planned for inpatient stay.     Mood disorder  Continue Klonopin 0.5 mg p.o. every 12 hours as needed  Patient does not endorse exacerbation of mood, will continue to hold other home mood medications for now    Pain Control:  Tylenol 650 mg Q6HR PRN for mild pain  DVT ppx: Lovenox 40 mg daily  GI ppx:  Pepcid 20 mg daily  Code Status: Full  Diet: General diet      Disposition: Discharge to Home pending clinical course    Soct Cardoso MD   Family Medicine Resident PGY-1  11/17/22   2:06 PM

## 2022-11-17 NOTE — PROGRESS NOTES
Intensive Care Daily Quality Rounding Checklist      ICU Team Members: Dr. Odessa Almeida, Clinical pharmacist, charge nurse, bedside nurse    ICU Day #: NUMBER: 2    Intubation Date:  N/A     Ventilator Day #: N/A     Central Line Insertion Date: November 16        Day #: NUMBER: 2     Arterial Line Insertion Date:  N/A      Temporary Hemodialysis Catheter Insertion Date:  N/A      DVT Prophylaxis: Lovenox    GI Prophylaxis:  N/A    Gerard Catheter Insertion Date:  N/A     Continued need (if yes, reason documented and discussed with physician): N/A    Skin Issues/ Wounds and ordered treatment discussed on rounds: No, SOS protocol    Goals/ Plans for the Day:  Monitor labs and vitals, treat/replace as needed. Transfer to medical bed.

## 2022-11-17 NOTE — CARE COORDINATION
Pt admit ICU for septic shock. Pt had cysto/stent placement in [facility] last week. Since fever, flank pain and dysuria and vomiting. Now with complicated UTI. Iv antibiotics, ID following. Initially on levophed drip-since stopped. Met with patient and spouse. Pt lives with spouse in ranch home. Independent prior to admit. Follows with physicians at the [facility]. Dr Kylee Fletcher. Plan is home with no needs. Will continue to follow-o    The Plan for Transition of Care is related to the following treatment goals: home     The Patient and/or patient representative pt  was provided with a choice of provider and agrees   with the discharge plan. [x] Yes [] No    Freedom of choice list was provided with basic dialogue that supports the patient's individualized plan of care/goals, treatment preferences and shares the quality data associated with the providers.  [x] Yes [] No

## 2022-11-18 LAB
ALBUMIN SERPL-MCNC: 2.4 G/DL (ref 3.5–5.2)
ALP BLD-CCNC: 98 U/L (ref 40–129)
ALT SERPL-CCNC: 43 U/L (ref 0–40)
ANION GAP SERPL CALCULATED.3IONS-SCNC: 9 MMOL/L (ref 7–16)
AST SERPL-CCNC: 40 U/L (ref 0–39)
BASOPHILS ABSOLUTE: 0.03 E9/L (ref 0–0.2)
BASOPHILS RELATIVE PERCENT: 0.2 % (ref 0–2)
BILIRUB SERPL-MCNC: 0.6 MG/DL (ref 0–1.2)
BUN BLDV-MCNC: 21 MG/DL (ref 6–20)
C-REACTIVE PROTEIN: 13.4 MG/DL (ref 0–0.4)
CALCIUM SERPL-MCNC: 8.1 MG/DL (ref 8.6–10.2)
CHLORIDE BLD-SCNC: 103 MMOL/L (ref 98–107)
CO2: 24 MMOL/L (ref 22–29)
CREAT SERPL-MCNC: 1.3 MG/DL (ref 0.7–1.2)
EKG ATRIAL RATE: 104 BPM
EKG P AXIS: 3 DEGREES
EKG P-R INTERVAL: 120 MS
EKG Q-T INTERVAL: 332 MS
EKG QRS DURATION: 78 MS
EKG QTC CALCULATION (BAZETT): 436 MS
EKG R AXIS: 59 DEGREES
EKG T AXIS: 39 DEGREES
EKG VENTRICULAR RATE: 104 BPM
EOSINOPHILS ABSOLUTE: 0.06 E9/L (ref 0.05–0.5)
EOSINOPHILS RELATIVE PERCENT: 0.5 % (ref 0–6)
GFR SERPL CREATININE-BSD FRML MDRD: >60 ML/MIN/1.73
GLUCOSE BLD-MCNC: 93 MG/DL (ref 74–99)
HCT VFR BLD CALC: 31.9 % (ref 37–54)
HEMOGLOBIN: 10.3 G/DL (ref 12.5–16.5)
IMMATURE GRANULOCYTES #: 0.11 E9/L
IMMATURE GRANULOCYTES %: 0.9 % (ref 0–5)
LYMPHOCYTES ABSOLUTE: 0.95 E9/L (ref 1.5–4)
LYMPHOCYTES RELATIVE PERCENT: 7.5 % (ref 20–42)
MAGNESIUM: 2 MG/DL (ref 1.6–2.6)
MCH RBC QN AUTO: 29.9 PG (ref 26–35)
MCHC RBC AUTO-ENTMCNC: 32.3 % (ref 32–34.5)
MCV RBC AUTO: 92.5 FL (ref 80–99.9)
METER GLUCOSE: 112 MG/DL (ref 74–99)
METER GLUCOSE: 135 MG/DL (ref 74–99)
METER GLUCOSE: 79 MG/DL (ref 74–99)
METER GLUCOSE: 81 MG/DL (ref 74–99)
METER GLUCOSE: 86 MG/DL (ref 74–99)
METER GLUCOSE: 96 MG/DL (ref 74–99)
MONOCYTES ABSOLUTE: 0.73 E9/L (ref 0.1–0.95)
MONOCYTES RELATIVE PERCENT: 5.7 % (ref 2–12)
MRSA CULTURE ONLY: NORMAL
NEUTROPHILS ABSOLUTE: 10.87 E9/L (ref 1.8–7.3)
NEUTROPHILS RELATIVE PERCENT: 85.2 % (ref 43–80)
PDW BLD-RTO: 14.4 FL (ref 11.5–15)
PHOSPHORUS: 3.5 MG/DL (ref 2.5–4.5)
PLATELET # BLD: 224 E9/L (ref 130–450)
PMV BLD AUTO: 9.4 FL (ref 7–12)
POTASSIUM SERPL-SCNC: 3.9 MMOL/L (ref 3.5–5)
RBC # BLD: 3.45 E12/L (ref 3.8–5.8)
SEDIMENTATION RATE, ERYTHROCYTE: 63 MM/HR (ref 0–15)
SODIUM BLD-SCNC: 136 MMOL/L (ref 132–146)
TOTAL PROTEIN: 5.3 G/DL (ref 6.4–8.3)
WBC # BLD: 12.8 E9/L (ref 4.5–11.5)

## 2022-11-18 PROCEDURE — 99233 SBSQ HOSP IP/OBS HIGH 50: CPT | Performed by: FAMILY MEDICINE

## 2022-11-18 PROCEDURE — 82962 GLUCOSE BLOOD TEST: CPT

## 2022-11-18 PROCEDURE — 6360000002 HC RX W HCPCS

## 2022-11-18 PROCEDURE — 86140 C-REACTIVE PROTEIN: CPT

## 2022-11-18 PROCEDURE — 1200000000 HC SEMI PRIVATE

## 2022-11-18 PROCEDURE — 36415 COLL VENOUS BLD VENIPUNCTURE: CPT

## 2022-11-18 PROCEDURE — 6370000000 HC RX 637 (ALT 250 FOR IP): Performed by: FAMILY MEDICINE

## 2022-11-18 PROCEDURE — 36592 COLLECT BLOOD FROM PICC: CPT

## 2022-11-18 PROCEDURE — 93010 ELECTROCARDIOGRAM REPORT: CPT | Performed by: INTERNAL MEDICINE

## 2022-11-18 PROCEDURE — 6360000002 HC RX W HCPCS: Performed by: INTERNAL MEDICINE

## 2022-11-18 PROCEDURE — 2700000000 HC OXYGEN THERAPY PER DAY

## 2022-11-18 PROCEDURE — 6360000002 HC RX W HCPCS: Performed by: FAMILY MEDICINE

## 2022-11-18 PROCEDURE — 83735 ASSAY OF MAGNESIUM: CPT

## 2022-11-18 PROCEDURE — 2580000003 HC RX 258: Performed by: FAMILY MEDICINE

## 2022-11-18 PROCEDURE — 85651 RBC SED RATE NONAUTOMATED: CPT

## 2022-11-18 PROCEDURE — 85025 COMPLETE CBC W/AUTO DIFF WBC: CPT

## 2022-11-18 PROCEDURE — 6370000000 HC RX 637 (ALT 250 FOR IP)

## 2022-11-18 PROCEDURE — 80053 COMPREHEN METABOLIC PANEL: CPT

## 2022-11-18 PROCEDURE — 2580000003 HC RX 258

## 2022-11-18 PROCEDURE — 84100 ASSAY OF PHOSPHORUS: CPT

## 2022-11-18 RX ADMIN — FAMOTIDINE 20 MG: 20 TABLET ORAL at 08:42

## 2022-11-18 RX ADMIN — SODIUM CHLORIDE, PRESERVATIVE FREE 5 ML: 5 INJECTION INTRAVENOUS at 20:00

## 2022-11-18 RX ADMIN — ACETAMINOPHEN 650 MG: 325 TABLET ORAL at 02:08

## 2022-11-18 RX ADMIN — ACETAMINOPHEN 650 MG: 325 TABLET ORAL at 16:39

## 2022-11-18 RX ADMIN — CEFEPIME 2000 MG: 2 INJECTION, POWDER, FOR SOLUTION INTRAVENOUS at 11:41

## 2022-11-18 RX ADMIN — HYDROMORPHONE HYDROCHLORIDE 0.5 MG: 1 INJECTION, SOLUTION INTRAMUSCULAR; INTRAVENOUS; SUBCUTANEOUS at 12:04

## 2022-11-18 RX ADMIN — ONDANSETRON 4 MG: 2 INJECTION INTRAMUSCULAR; INTRAVENOUS at 14:43

## 2022-11-18 RX ADMIN — HYDROMORPHONE HYDROCHLORIDE 0.5 MG: 1 INJECTION, SOLUTION INTRAMUSCULAR; INTRAVENOUS; SUBCUTANEOUS at 21:53

## 2022-11-18 RX ADMIN — CEFEPIME 2000 MG: 2 INJECTION, POWDER, FOR SOLUTION INTRAVENOUS at 23:34

## 2022-11-18 RX ADMIN — SODIUM CHLORIDE, PRESERVATIVE FREE 10 ML: 5 INJECTION INTRAVENOUS at 08:42

## 2022-11-18 RX ADMIN — ONDANSETRON 4 MG: 4 TABLET, ORALLY DISINTEGRATING ORAL at 02:21

## 2022-11-18 RX ADMIN — ENOXAPARIN SODIUM 40 MG: 100 INJECTION SUBCUTANEOUS at 08:42

## 2022-11-18 ASSESSMENT — PAIN SCALES - GENERAL
PAINLEVEL_OUTOF10: 0
PAINLEVEL_OUTOF10: 6
PAINLEVEL_OUTOF10: 5

## 2022-11-18 ASSESSMENT — PAIN DESCRIPTION - ONSET: ONSET: GRADUAL

## 2022-11-18 ASSESSMENT — PAIN DESCRIPTION - LOCATION: LOCATION: FLANK

## 2022-11-18 ASSESSMENT — PAIN DESCRIPTION - ORIENTATION: ORIENTATION: LEFT

## 2022-11-18 ASSESSMENT — PAIN DESCRIPTION - PAIN TYPE: TYPE: SURGICAL PAIN

## 2022-11-18 ASSESSMENT — PAIN DESCRIPTION - DESCRIPTORS: DESCRIPTORS: CRAMPING

## 2022-11-18 ASSESSMENT — PAIN - FUNCTIONAL ASSESSMENT: PAIN_FUNCTIONAL_ASSESSMENT: PREVENTS OR INTERFERES SOME ACTIVE ACTIVITIES AND ADLS

## 2022-11-18 NOTE — PLAN OF CARE
Problem: Discharge Planning  Goal: Discharge to home or other facility with appropriate resources  11/18/2022 0323 by Hilario Hagan RN  Outcome: Progressing  11/17/2022 1710 by Jyothi Aguilar RN  Outcome: Progressing  11/17/2022 1706 by Jyothi Aguilar RN  Outcome: Progressing     Problem: Pain  Goal: Verbalizes/displays adequate comfort level or baseline comfort level  11/18/2022 0323 by Hilario Hagan RN  Outcome: Progressing  11/17/2022 1710 by Jyothi Aguilar RN  Outcome: Progressing  11/17/2022 1706 by Jyothi Aguilar RN  Outcome: Progressing     Problem: Safety - Adult  Goal: Free from fall injury  11/18/2022 0323 by Hilario Hagan RN  Outcome: Progressing  11/17/2022 1710 by Jyothi Aguilar RN  Outcome: Progressing  11/17/2022 1706 by Jyothi Aguilar RN  Outcome: Progressing     Problem: Skin/Tissue Integrity  Goal: Absence of new skin breakdown  Description: 1. Monitor for areas of redness and/or skin breakdown  2. Assess vascular access sites hourly  3. Every 4-6 hours minimum:  Change oxygen saturation probe site  4. Every 4-6 hours:  If on nasal continuous positive airway pressure, respiratory therapy assess nares and determine need for appliance change or resting period.   11/18/2022 0323 by Hilario Hagan RN  Outcome: Progressing  11/17/2022 1710 by Jyothi Aguilar RN  Outcome: Progressing  11/17/2022 1706 by Jyothi Aguilar RN  Outcome: Progressing     Problem: ABCDS Injury Assessment  Goal: Absence of physical injury  11/18/2022 0323 by Hilario Hagan RN  Outcome: Progressing  11/17/2022 1710 by Jyothi Aguilar RN  Outcome: Progressing  11/17/2022 1706 by Jyothi Aguilar RN  Outcome: Progressing

## 2022-11-18 NOTE — PROGRESS NOTES
[facility] Progress Note    Admitting Date and Time: 11/16/2022 11:26 AM  Admit Dx: Hypokalemia [E87.6]  Hypomagnesemia [E83.42]  Septic shock ([address] [A41.9, R65.21]  Sepsis, due to unspecified organism, unspecified whether acute organ dysfunction present ([address] [A41.9]  Ureteral stent present [Z96.0]    Subjective:  Patient is being followed for Hypokalemia [E87.6]  Hypomagnesemia [E83.42]  Septic shock ([address] [A41.9, R65.21]  Sepsis, due to unspecified organism, unspecified whether acute organ dysfunction present ([address] [A41.9]  Ureteral stent present [Z96.0]   Pt feels night was rough. Spiked a temperature to 101.3. Cultures thus far negative. Repeat CT on admission showed only stranding of the left kidney. If fevers persist, consider renal ultrasound to r/o abscess. Per RN: No new concerns.     ROS: denies fever, chills, cp, sob, n/v, HA unless stated above.      enoxaparin  40 mg SubCUTAneous Daily    sennosides-docusate sodium  2 tablet Oral Daily    famotidine  20 mg Oral Daily    cefepime  2,000 mg IntraVENous Q12H    sodium chloride flush  5-40 mL IntraVENous 2 times per day    insulin lispro  0-4 Units SubCUTAneous TID WC    insulin lispro  0-4 Units SubCUTAneous Nightly     medicated lip balm, , PRN  ondansetron, 4 mg, Q8H PRN   Or  ondansetron, 4 mg, Q6H PRN  polyethylene glycol, 17 g, Daily PRN  clonazePAM, 0.5 mg, Q12H PRN  glucose, 4 tablet, PRN  dextrose bolus, 125 mL, PRN   Or  dextrose bolus, 250 mL, PRN  glucagon (rDNA), 1 mg, PRN  sodium chloride flush, 5-40 mL, PRN  sodium chloride, , PRN  acetaminophen, 650 mg, Q6H PRN   Or  acetaminophen, 650 mg, Q6H PRN    Objective:    /83   Pulse 75   Temp 98.5 °F (36.9 °C) (Oral)   Resp 30   Ht 6' 3\" (1.905 m)   Wt 235 lb 10.8 oz (106.9 kg)   SpO2 100%   BMI 29.46 kg/m²     General Appearance: alert and oriented to person, place and time and in no acute distress  Skin: warm and dry  Head: normocephalic and (MA) FINDINGS: There is left double-J stent in place with minimal left hydronephrosis and no evidence of ureteral calculus. There is mild left perirenal and periureteral stranding. There is a nonobstructive calculus in the lower pole of the right kidney. There is no right hydronephrosis. Evaluation of the solid organs is limited without administration of intravenous contrast.  However, there is hypoattenuation of the liver parenchyma suggestive of fatty change. There is no evidence of focal hepatic lesion. The spleen and adrenal glands are unremarkable. There is mild fatty replacement of the pancreatic head. No evidence of focal pancreatic lesion. There are bilateral renal cortical cysts. There are calcifications on the posterior left kidney that may be associated with cysts. There are bilateral renal lesions that are more dense than simple cyst and may represent hemorrhagic or proteinaceous cysts. The patient is status post cholecystectomy without evidence of biliary ductal dilatation. There is no evidence of bowel obstruction, pneumoperitoneum, or ascites. There is enlargement of the prostate gland. There is fat stranding adjacent to the base of the urinary bladder and prostate gland. There is retained stool within the colon which may represent constipation. The appendix is unremarkable in appearance. There is arteriosclerosis without abdominal aortic aneurysm. There are trace bilateral pleural effusions. There is mild subsegmental atelectasis and/or scarring within the bilateral lung bases. There are degenerative changes within the spine. 1. Left double-J stent in place with no evidence of left urolithiasis. There is minimal left hydronephrosis with left perirenal and periureteral stranding. 2. There is mild fat stranding adjacent to the urinary bladder and prostate gland with enlargement of the prostate gland. Consider prostatitis or cystitis. 3. Right nonobstructive nephrolithiasis.  4. Possible constipation. 5. Fatty liver. XR CHEST PORTABLE    Result Date: 11/16/2022  EXAMINATION: ONE XRAY VIEW OF THE CHEST 11/16/2022 3:05 pm COMPARISON: 11 June 2021 HISTORY: ORDERING SYSTEM PROVIDED HISTORY: eval for central line TECHNOLOGIST PROVIDED HISTORY: Reason for exam:->eval for central line FINDINGS: Single AP portable chest demonstrates right IJ catheter in good position with tip in the superior cava. There is no evidence of a pneumothorax. The lungs are clear. There are atherosclerotic changes of the aorta without cardiomegaly. Right IJ catheter in good position with no pneumothorax. Stable chest.      Assessment:    Principal Problem:    Septic shock (HCC)  Active Problems:    Hypomagnesemia    Ureteral stent present    Hypokalemia    Sepsis ([address]  Resolved Problems:    * No resolved hospital problems. *    Plan:   Urosepsis - spiked temperature to 101. 3. Continue IV antibiotics. Trend labs and treat accordingly. For transfer out of the ICU today when bed available. Mood Disorder - Hold Cymbalta and Adderall. Continue Klonopin prn.  3.    T2DM - glucose POCT. Hypoglycemia protocol. Last HgbA1C three weeks ago was 5.2%. Regular diet. Adjust accordingly based on blood sugars. 4.     TOSHIA - creatinine 1.3, unchanged. BP good. Avoid nephrotoxins. IV hydration. Trend labs and treat accordingly. Total care time:  20 minutes    NOTE: This report was transcribed using voice recognition software. Every effort was made to ensure accuracy; however, inadvertent computerized transcription errors may be present.     Electronically signed by Shira Helton MD on 11/18/2022 at 8:23 AM

## 2022-11-18 NOTE — PROGRESS NOTES
Results   Component Value Date    WBC 12.8 (H) 11/18/2022    WBC 13.6 (H) 11/17/2022    WBC 20.0 (H) 11/16/2022    HGB 10.3 (L) 11/18/2022    HCT 31.9 (L) 11/18/2022    MCV 92.5 11/18/2022     11/18/2022     Lab Results   Component Value Date    NEUTROABS 10.87 (H) 11/18/2022    NEUTROABS 11.34 (H) 11/17/2022    NEUTROABS 19.00 (H) 11/16/2022     No results found for: CRPHS  Lab Results   Component Value Date    ALT 43 (H) 11/18/2022    AST 40 (H) 11/18/2022    ALKPHOS 98 11/18/2022    BILITOT 0.6 11/18/2022     Lab Results   Component Value Date/Time     11/18/2022 05:45 AM    K 3.9 11/18/2022 05:45 AM    K 3.5 06/13/2021 03:00 AM     11/18/2022 05:45 AM    CO2 24 11/18/2022 05:45 AM    BUN 21 11/18/2022 05:45 AM    CREATININE 1.3 11/18/2022 05:45 AM    CREATININE 1.5 11/17/2022 03:32 AM    CREATININE 1.5 11/16/2022 09:45 PM    GFRAA >60 06/13/2021 03:00 AM    LABGLOM >60 11/18/2022 05:45 AM    GLUCOSE 93 11/18/2022 05:45 AM    PROT 5.3 11/18/2022 05:45 AM    LABALBU 2.4 11/18/2022 05:45 AM    CALCIUM 8.1 11/18/2022 05:45 AM    BILITOT 0.6 11/18/2022 05:45 AM    ALKPHOS 98 11/18/2022 05:45 AM    AST 40 11/18/2022 05:45 AM    ALT 43 11/18/2022 05:45 AM     Lab Results   Component Value Date    CRP 17.9 (H) 11/17/2022    CRP 21.1 (H) 11/17/2022     Lab Results   Component Value Date    SEDRATE 70 (H) 11/17/2022     Radiology:      Microbiology:   Blood cultures 11/16/2022: Negative so far  Nares screen MRSA: Negative  Rapid SARS-CoV-2: Negative  Urine culture 11/16/2022: Not sent    Recent Labs     11/16/22  1154   PROCAL 24.81*       ASSESSMENT:  Status post cystoscopy with left stenting 11/11/2022 in [facility]  Complicated UTI with sepsis associated to urolithiasis and left ureteral stent  Possible prostatitis  Sepsis with septic shock secondary to complicated UTI-improved  Fever secondary to UTI and sepsis-resolved  Leukocytosis secondary to complicated UTI-improving    PLAN:  Continue Cefepime alone for now  I called microbiology and asked him to look for a urine sample and run it for a culture  Monitor labs    Spoke with nursing.       Pedrito Durand MD  9:07 AM  11/18/2022

## 2022-11-18 NOTE — PLAN OF CARE
Problem: Discharge Planning  Goal: Discharge to home or other facility with appropriate resources  11/18/2022 1434 by Shanell Pillai RN  Outcome: Progressing    Problem: Pain  Goal: Verbalizes/displays adequate comfort level or baseline comfort level  11/18/2022 1434 by Shanell Pillai RN  Outcome: Progressing    Problem: Safety - Adult  Goal: Free from fall injury  11/18/2022 1434 by Shanell Pillai RN  Outcome: Progressing    Problem: Skin/Tissue Integrity  Goal: Absence of new skin breakdown  Description: 1. Monitor for areas of redness and/or skin breakdown  2. Assess vascular access sites hourly  3. Every 4-6 hours minimum:  Change oxygen saturation probe site  4. Every 4-6 hours:  If on nasal continuous positive airway pressure, respiratory therapy assess nares and determine need for appliance change or resting period.   11/18/2022 1434 by Shanell Pillai RN  Outcome: Progressing  Problem: ABCDS Injury Assessment  Goal: Absence of physical injury  11/18/2022 1434 by Shanell Pillai RN  Outcome: Progressing

## 2022-11-18 NOTE — PROGRESS NOTES
Physician Progress Note      Amber Abarca  CSN #:                  [id number]  :                       1965  ADMIT DATE:       2022 11:26 AM  DISCH DATE:  RESPONDING  PROVIDER #:        Sarina Short MD          QUERY TEXT:    Dear Attending Physician,    Pt admitted with UTI ,Sepsis, TOSHIA. Pt noted to have recent placement of   ureteral stent 11/10 . If possible, please document in the progress notes and   discharge summary if you are evaluating and/or treating any of the following: The medical record reflects the following:  Risk Factors: recent ureteral stent placement [facility]  Clinical Indicators: Per PCP ,\". [facility] Sepsis with septic shock secondary to   complicated UTI . [facility] \"     Per URO ,\". .. Sepsis, hypotension after kidney   stone procedure [facility] on Thursday. Reviewed CT scan and appears   that the stent is indwelling and is in good position. There is no evidence of   hydronephrosis. [facility] \"  Treatment: IV ATB    Thank you,  Parth Mena RN CCDS  Clinical Documentation Improvement Specialist  Options provided:  -- UTI due to ureteral stent  -- UTI as a complication of ureteral stent placement  -- UTI due to, Please specify the likely cause of UTI. -- Other - I will add my own diagnosis  -- Disagree - Not applicable / Not valid  -- Disagree - Clinically unable to determine / Unknown  -- Refer to Clinical Documentation Reviewer    PROVIDER RESPONSE TEXT:    UTI is a complication of ureteral stent placement on 11/10/2022.     Query created by: Cynthia Levine on 2022 4:25 PM      Electronically signed by:  Sarina Short MD 2022 10:16 PM

## 2022-11-18 NOTE — PATIENT CARE CONFERENCE
Intensive Care Daily Quality Rounding Checklist        ICU Team Members: Charge nurse and bedside nurse     ICU Day #: N/A     Intubation Date:  N/A                 Ventilator Day #: N/A                 Central Line Insertion Date: November 16                                                    Day #: NUMBER: 2      Arterial Line Insertion Date:  N/A      Day #: N/A      Temporary Hemodialysis Catheter Insertion Date:  N/A      Day #: N/A      DVT Prophylaxis: Lovenox    GI Prophylaxis:  N/A     Gerard Catheter Insertion Date:  N/A                Continued need (if yes, reason documented and discussed with physician): N/A     Skin Issues/ Wounds and ordered treatment discussed on rounds: No, SOS protocol     Goals/ Plans for the Day:  Monitor labs and vitals, treat/replace as needed.  Transfer to medical bed once bed available

## 2022-11-18 NOTE — CARE COORDINATION
Updated plan of care. Continue iv antibiotics, pending cultures. Plan to change to po antibiotics upon discharge.  Plan is home with spouse, no needs-mjo

## 2022-11-18 NOTE — PROGRESS NOTES
11/18/2022 2:32 PM  Service: Urology  Group: [facility] (Rashawn/Adan/Víctor)    Mauricio Vang  [phone]    Subjective:   Had a \"tough night\"  Noticed some blood in his urine  Left flank pain last night  A little better now  Voiding comfortably         Review of Systems  Constitutional: + fever last night  Respiratory: negative  Cardiovascular: negative  Gastrointestinal: negative  Dermatologic:no pruritis  Hematologic/lymphatic: mild hematuria last night  Musculoskeletal:negative  Neurological: negative  Endocrine: hx dm   Psychiatric: negative  : see above    Scheduled Meds:   enoxaparin  40 mg SubCUTAneous Daily    sennosides-docusate sodium  2 tablet Oral Daily    famotidine  20 mg Oral Daily    cefepime  2,000 mg IntraVENous Q12H    sodium chloride flush  5-40 mL IntraVENous 2 times per day    insulin lispro  0-4 Units SubCUTAneous TID WC    insulin lispro  0-4 Units SubCUTAneous Nightly       Objective:  Vitals:    11/18/22 1234   BP:    Pulse:    Resp: 16   Temp:    SpO2:          Allergies: Statins    General Appearance: alert and oriented to person, place and time and in no acute distress  Skin: no rash or erythema  Head: normocephalic and atraumatic  Pulmonary/Chest: normal air movement, no respiratory distress   Abdomen: soft, non-tender, non-distended  Genitalia: deferred  Extremities: no cyanosis, clubbing or edema     Labs:     Recent Labs     11/18/22  0545      K 3.9      CO2 24   BUN 21*   CREATININE 1.3*   GLUCOSE 93   CALCIUM 8.1*       Lab Results   Component Value Date/Time    HGB 10.3 11/18/2022 05:45 AM    HCT 31.9 11/18/2022 05:45 AM     Lab Results   Component Value Date     PSA 1.46 12/17/2019     PSA 1.25 12/20/2018     PSA see note (AA) 12/05/2017     PSA 1.35 12/05/2017       Assessment/Plan:  POD#8--S/P Cysto/Left ureteroscopy/Left stent insertion (Dr. Nazanin Ruff [facility])  -CT scan on 11/16/2022 showing a left well-placed ureteral stent without evidence of residual stone debris within the urinary tract aside from a punctate right lower pole renal calculus. There are bilateral simple renal cysts  -Urine cultures pending.   Continue antibiotics per infectious disease recommendations   -Tiny right renal stone is of no clinical concern at this time and will be monitored and managed nonoperatively  -Cystoscopy and stent removal to be performed in the outpatient setting in our office after resolution of his acute infection        ADALBERTO Tomas - CNP   [facility]  Urology

## 2022-11-18 NOTE — DISCHARGE INSTRUCTIONS
A ureteral stent was inserted during your recent procedure. Unlike a heart \"stent\" which is metal, short, and permanent, this ureteral stent plastic, and temporary. It spans from your kidney, down the ureter, and into your bladder. This will need to be removed, so it is very important that you follow-up with your doctor. IMPORTANT - This ureteral stent will likely cause you to experience frequent urination, urgency to urinate, back/flank pain with urination, and/or blood in the urine. These things are very normal.  Taking the pain medications and/or anti-inflammatories will help to manage this discomfort if present. If you have any questions or concerns you can contact [facility] Urology office at [phone]. Ureteral Stent Placement: What to Expect at [address] [facility]     A ureteral (say \"you-REE-ter-ul\") stent is a thin, hollow tube that is placed in the ureter to help urine pass from the kidney into the bladder. Ureters are the tubes that connect the kidneys to the bladder. You may have a small amount of blood in your urine for 1 to 3 days after the procedure. While the stent is in place, you may have to urinate more often, feel a sudden need to urinate, or feel like you can't completely empty your bladder. You may feel some pain when you urinate or do strenuous activity. You also may notice a small amount of blood in your urine after strenuous activities. These side effects usually don't prevent people from doing their normal daily activities. You may have a thin string coming out of your urethra. Your urethra is the tube that carries urine from your bladder to outside your body. This string is attached to the stent. Try not to pull on the string. It will be used to pull out the stent when you no longer need it. After the procedure, urine may flow better from your kidneys to your bladder. A ureteral stent may be left in place for several days or for as long as several months.  Your doctor will take it out when you no longer need it. Or, in some cases, it may be taken out at home. This care sheet gives you a general idea about how long it will take for you to recover. But each person recovers at a different pace. Follow the steps below to get better as quickly as possible. How can you care for yourself at home? Activity    Rest when you feel tired. Getting enough sleep will help you recover. Avoid strenuous activities, such as bicycle riding, jogging, weight lifting, or aerobic exercise, until your doctor says it is okay. Ask your doctor when you can drive again. Most people are able to return to work the day after the procedure. If your work requires intense activity, you may feel pain in your kidney area or get tired easily. If this happens, you may need to do less strenuous activities while the stent is in. Ask your doctor when it is okay for you to have sex. Diet    You can eat your normal diet. If your stomach is upset, try bland, low-fat foods like plain rice, broiled chicken, toast, and yogurt. Drink plenty of fluids (unless your doctor tells you not to). Medicines    Your doctor will tell you if and when you can restart your medicines. You will also get instructions about taking any new medicines. If you take aspirin or some other blood thinner, ask your doctor if and when to start taking it again. Make sure that you understand exactly what your doctor wants you to do. Be safe with medicines. Take pain medicines exactly as directed. If the doctor gave you a prescription medicine for pain, take it as prescribed. If you are not taking a prescription pain medicine, ask your doctor if you can take an over-the-counter medicine. If you think your pain medicine is making you sick to your stomach: Take your medicine after meals (unless your doctor has told you not to). Ask your doctor for a different pain medicine.      If your doctor prescribed antibiotics, take them as directed. Do not stop taking them just because you feel better. You need to take the full course of antibiotics. Follow-up care is a key part of your treatment and safety. Be sure to make and go to all appointments, and call your doctor if you are having problems. It's also a good idea to know your test results and keep a list of the medicines you take. When should you call for help? Call 911 anytime you think you may need emergency care. For example, call if:    You passed out (lost consciousness). You have severe trouble breathing. You have sudden chest pain and shortness of breath, or you cough up blood. You have severe belly pain. Call your doctor now or seek immediate medical care if:    Part or all of the stent comes out of your urethra. You have pain that does not get better after you take pain medicine. You have symptoms of a urinary infection. For example: You have blood or pus in your urine. You have pain in your back just below your rib cage. This is called flank pain. You have a fever, chills, or body aches. It hurts to urinate. You have groin or belly pain. You cannot control when you urinate, or you leak urine. Watch closely for changes in your health, and be sure to contact your doctor if you have any problems. Where can you learn more? Go to [email or web address]://[employer]. org and sign in to your [employer] account. Enter J636 in the [facility] box to learn more about \"Ureteral Stent Placement: What to Expect at Home. \"     If you do not have an account, please click on the \"Sign Up Now\" link. Current as of: February 10, 2021               Content Version: 12.9  © 3703-3394 [employer], Incorporated. Care instructions adapted under license by [facility] [employer] [facility] ([facility]).  If you have questions about a medical condition or this instruction, always ask your healthcare professional. [place] [address] any warranty or liability for your use of this information.

## 2022-11-18 NOTE — PROGRESS NOTES
Comprehensive Nutrition Assessment    Type and Reason for Visit:  Initial, RD Nutrition Re-Screen/LOS (ICU LOS)    Nutrition Recommendations/Plan:   Continue current diet & monitor. May recommend carb controlled diet prn. Nutrition Assessment:    Pt w/ recent hx s/p cysto w/ stenting 11/11 presents w/ L-sided flank pain & fevers admits w/ urosepsis, note TOSHIA. Hx DM. Pt consuming % of meals, may recommend carb controlled diet prn & monitor. Nutrition Related Findings:    A&O, +I&Os, no edema, abd soft/rounded, +BS, Glu 93 Wound Type: None       Current Nutrition Intake & Therapies:    Average Meal Intake: %  Average Supplements Intake: None Ordered  ADULT DIET; Regular    Anthropometric Measures:  Height: 6' 3\" (190.5 cm)  Ideal Body Weight (IBW): 196 lbs (89 kg)    Admission Body Weight: 236 lb (107 kg) (11/17 bed first measured)  Current Body Weight: 235 lb (106.6 kg), 119.9 % IBW. Weight Source: Bed Scale (11/18)  Current BMI (kg/m2): 29.4  Usual Body Weight: 257 lb (116.6 kg) (6/2022 actual per EMR [facility])  % Weight Change (Calculated): -8.6  Weight Adjustment For: No Adjustment                 BMI Categories: Overweight (BMI 25.0-29. 9)    Nutrition Diagnosis:   No nutrition diagnosis at this time     Nutrition Interventions:   Food and/or Nutrient Delivery: Continue Current Diet  Nutrition Education/Counseling: Education not indicated  Coordination of Nutrition Care: Continue to monitor while inpatient       Goals:     Goals: PO intake 75% or greater       Nutrition Monitoring and Evaluation:      Food/Nutrient Intake Outcomes: Food and Nutrient Intake  Physical Signs/Symptoms Outcomes: Biochemical Data, GI Status, Fluid Status or Edema, Nutrition Focused Physical Findings, Skin, Weight    Discharge Planning:     Too soon to determine     Orestes Wahl RD, LD  Contact: [phone]

## 2022-11-19 VITALS
HEART RATE: 73 BPM | WEIGHT: 230.6 LBS | SYSTOLIC BLOOD PRESSURE: 143 MMHG | OXYGEN SATURATION: 96 % | HEIGHT: 75 IN | DIASTOLIC BLOOD PRESSURE: 94 MMHG | BODY MASS INDEX: 28.67 KG/M2 | TEMPERATURE: 99.3 F | RESPIRATION RATE: 18 BRPM

## 2022-11-19 LAB
ALBUMIN SERPL-MCNC: 2.5 G/DL (ref 3.5–5.2)
ALP BLD-CCNC: 89 U/L (ref 40–129)
ALT SERPL-CCNC: 44 U/L (ref 0–40)
ANION GAP SERPL CALCULATED.3IONS-SCNC: 7 MMOL/L (ref 7–16)
AST SERPL-CCNC: 45 U/L (ref 0–39)
BASOPHILS ABSOLUTE: 0.04 E9/L (ref 0–0.2)
BASOPHILS RELATIVE PERCENT: 0.3 % (ref 0–2)
BILIRUB SERPL-MCNC: 0.6 MG/DL (ref 0–1.2)
BUN BLDV-MCNC: 14 MG/DL (ref 6–20)
C-REACTIVE PROTEIN: 13.4 MG/DL (ref 0–0.4)
CALCIUM SERPL-MCNC: 8.2 MG/DL (ref 8.6–10.2)
CHLORIDE BLD-SCNC: 101 MMOL/L (ref 98–107)
CO2: 27 MMOL/L (ref 22–29)
CREAT SERPL-MCNC: 1.1 MG/DL (ref 0.7–1.2)
EOSINOPHILS ABSOLUTE: 0.13 E9/L (ref 0.05–0.5)
EOSINOPHILS RELATIVE PERCENT: 1 % (ref 0–6)
GFR SERPL CREATININE-BSD FRML MDRD: >60 ML/MIN/1.73
GLUCOSE BLD-MCNC: 110 MG/DL (ref 74–99)
HCT VFR BLD CALC: 31.5 % (ref 37–54)
HEMOGLOBIN: 10.5 G/DL (ref 12.5–16.5)
IMMATURE GRANULOCYTES #: 0.31 E9/L
IMMATURE GRANULOCYTES %: 2.5 % (ref 0–5)
LYMPHOCYTES ABSOLUTE: 1.22 E9/L (ref 1.5–4)
LYMPHOCYTES RELATIVE PERCENT: 9.7 % (ref 20–42)
MAGNESIUM: 1.8 MG/DL (ref 1.6–2.6)
MCH RBC QN AUTO: 30.3 PG (ref 26–35)
MCHC RBC AUTO-ENTMCNC: 33.3 % (ref 32–34.5)
MCV RBC AUTO: 91 FL (ref 80–99.9)
MONOCYTES ABSOLUTE: 1.3 E9/L (ref 0.1–0.95)
MONOCYTES RELATIVE PERCENT: 10.3 % (ref 2–12)
NEUTROPHILS ABSOLUTE: 9.62 E9/L (ref 1.8–7.3)
NEUTROPHILS RELATIVE PERCENT: 76.2 % (ref 43–80)
PDW BLD-RTO: 14.3 FL (ref 11.5–15)
PHOSPHORUS: 2.8 MG/DL (ref 2.5–4.5)
PLATELET # BLD: 264 E9/L (ref 130–450)
PMV BLD AUTO: 9.6 FL (ref 7–12)
POTASSIUM SERPL-SCNC: 3.8 MMOL/L (ref 3.5–5)
RBC # BLD: 3.46 E12/L (ref 3.8–5.8)
SEDIMENTATION RATE, ERYTHROCYTE: 66 MM/HR (ref 0–15)
SODIUM BLD-SCNC: 135 MMOL/L (ref 132–146)
TOTAL PROTEIN: 5.6 G/DL (ref 6.4–8.3)
WBC # BLD: 12.6 E9/L (ref 4.5–11.5)

## 2022-11-19 PROCEDURE — 84100 ASSAY OF PHOSPHORUS: CPT

## 2022-11-19 PROCEDURE — 86140 C-REACTIVE PROTEIN: CPT

## 2022-11-19 PROCEDURE — 36415 COLL VENOUS BLD VENIPUNCTURE: CPT

## 2022-11-19 PROCEDURE — 6370000000 HC RX 637 (ALT 250 FOR IP): Performed by: FAMILY MEDICINE

## 2022-11-19 PROCEDURE — 83735 ASSAY OF MAGNESIUM: CPT

## 2022-11-19 PROCEDURE — 85025 COMPLETE CBC W/AUTO DIFF WBC: CPT

## 2022-11-19 PROCEDURE — 6370000000 HC RX 637 (ALT 250 FOR IP)

## 2022-11-19 PROCEDURE — 2580000003 HC RX 258: Performed by: FAMILY MEDICINE

## 2022-11-19 PROCEDURE — 6360000002 HC RX W HCPCS

## 2022-11-19 PROCEDURE — 80053 COMPREHEN METABOLIC PANEL: CPT

## 2022-11-19 PROCEDURE — 6360000002 HC RX W HCPCS: Performed by: INTERNAL MEDICINE

## 2022-11-19 PROCEDURE — 2700000000 HC OXYGEN THERAPY PER DAY

## 2022-11-19 PROCEDURE — 85651 RBC SED RATE NONAUTOMATED: CPT

## 2022-11-19 PROCEDURE — 36592 COLLECT BLOOD FROM PICC: CPT

## 2022-11-19 PROCEDURE — 2580000003 HC RX 258

## 2022-11-19 RX ORDER — LACTOBACILLUS RHAMNOSUS GG 10B CELL
1 CAPSULE ORAL DAILY
Qty: 30 CAPSULE | Refills: 0 | Status: SHIPPED | OUTPATIENT
Start: 2022-11-20

## 2022-11-19 RX ORDER — OXYCODONE HYDROCHLORIDE 5 MG/1
5 CAPSULE ORAL EVERY 6 HOURS PRN
Qty: 12 CAPSULE | Refills: 0 | Status: ON HOLD | OUTPATIENT
Start: 2022-11-19 | End: 2022-11-23

## 2022-11-19 RX ORDER — LACTOBACILLUS RHAMNOSUS GG 10B CELL
1 CAPSULE ORAL DAILY
Status: DISCONTINUED | OUTPATIENT
Start: 2022-11-19 | End: 2022-11-19 | Stop reason: HOSPADM

## 2022-11-19 RX ORDER — LEVOFLOXACIN 500 MG/1
500 TABLET, FILM COATED ORAL DAILY
Status: DISCONTINUED | OUTPATIENT
Start: 2022-11-19 | End: 2022-11-19 | Stop reason: HOSPADM

## 2022-11-19 RX ORDER — LEVOFLOXACIN 500 MG/1
500 TABLET, FILM COATED ORAL DAILY
Qty: 10 TABLET | Refills: 0 | Status: ON HOLD | OUTPATIENT
Start: 2022-11-19 | End: 2022-11-22 | Stop reason: HOSPADM

## 2022-11-19 RX ORDER — CEFDINIR 300 MG/1
300 CAPSULE ORAL EVERY 12 HOURS SCHEDULED
Status: DISCONTINUED | OUTPATIENT
Start: 2022-11-19 | End: 2022-11-19 | Stop reason: HOSPADM

## 2022-11-19 RX ORDER — CEFDINIR 300 MG/1
300 CAPSULE ORAL EVERY 12 HOURS SCHEDULED
Qty: 20 CAPSULE | Refills: 0 | Status: ON HOLD | OUTPATIENT
Start: 2022-11-19 | End: 2022-11-22 | Stop reason: HOSPADM

## 2022-11-19 RX ADMIN — SODIUM CHLORIDE, PRESERVATIVE FREE 10 ML: 5 INJECTION INTRAVENOUS at 08:11

## 2022-11-19 RX ADMIN — Medication 1 CAPSULE: at 10:42

## 2022-11-19 RX ADMIN — FAMOTIDINE 20 MG: 20 TABLET ORAL at 08:11

## 2022-11-19 RX ADMIN — DOCUSATE SODIUM 50 MG AND SENNOSIDES 8.6 MG 2 TABLET: 8.6; 5 TABLET, FILM COATED ORAL at 08:11

## 2022-11-19 RX ADMIN — ENOXAPARIN SODIUM 40 MG: 100 INJECTION SUBCUTANEOUS at 08:10

## 2022-11-19 RX ADMIN — CLONAZEPAM 0.5 MG: 0.5 TABLET ORAL at 01:46

## 2022-11-19 RX ADMIN — CEFEPIME 2000 MG: 2 INJECTION, POWDER, FOR SOLUTION INTRAVENOUS at 10:38

## 2022-11-19 ASSESSMENT — ENCOUNTER SYMPTOMS
ABDOMINAL PAIN: 0
CHEST TIGHTNESS: 0
COLOR CHANGE: 0
VOMITING: 0
EYE REDNESS: 0
NAUSEA: 0
SHORTNESS OF BREATH: 0

## 2022-11-19 ASSESSMENT — PAIN SCALES - GENERAL
PAINLEVEL_OUTOF10: 0
PAINLEVEL_OUTOF10: 0

## 2022-11-19 NOTE — PROGRESS NOTES
[address] [place] [address] [facility]  Progress Note    SUBJECTIVE:  Chief Complaint   Patient presents with    Flank Pain     Left, had stent placed last week, states \"I'm getting worse\"      No new complaints. The patient would like to go home. No nausea or vomiting. No fever. Less pain left flank. He is downgraded and off the monitor. Review of systems:  As stated above in the chief complaint, otherwise negative. Medications:  Scheduled Meds:   lactobacillus  1 capsule Oral Daily    enoxaparin  40 mg SubCUTAneous Daily    sennosides-docusate sodium  2 tablet Oral Daily    famotidine  20 mg Oral Daily    cefepime  2,000 mg IntraVENous Q12H    sodium chloride flush  5-40 mL IntraVENous 2 times per day    insulin lispro  0-4 Units SubCUTAneous TID WC    insulin lispro  0-4 Units SubCUTAneous Nightly     Continuous Infusions:   sodium chloride       PRN Meds:HYDROmorphone, medicated lip balm, ondansetron **OR** ondansetron, polyethylene glycol, clonazePAM, glucose, dextrose bolus **OR** dextrose bolus, glucagon (rDNA), sodium chloride flush, sodium chloride, acetaminophen **OR** acetaminophen    OBJECTIVE:  BP (!) 143/94   Pulse 73   Temp 99.3 °F (37.4 °C)   Resp 18   Ht 6' 3\" (1.905 m)   Wt 230 lb 9.6 oz (104.6 kg)   SpO2 96%   BMI 28.82 kg/m²   Temp  Av.6 °F (37.6 °C)  Min: 98.4 °F (36.9 °C)  Max: 102 °F (38.9 °C)  Constitutional: The patient is awake, alert, and oriented. Sitting up in bed in the ICU. Wife present. Skin: Warm and dry. No rashes were noted. HEENT: Round and reactive pupils. Moist mucous membranes. No ulcerations or thrush. Neck: Supple to movements. Chest: No use of accessory muscles to breathe. Symmetrical expansion. No wheezing, crackles or rhonchi. Cardiovascular: S1 and S2 are rhythmic and regular. No murmurs appreciated. Abdomen: Positive bowel sounds to auscultation. Benign to palpation. Extremities: No edema.   Lines: Peripheral.    Laboratory and Tests Review:  Lab Results   Component Value Date    WBC 12.6 (H) 11/19/2022    WBC 12.8 (H) 11/18/2022    WBC 13.6 (H) 11/17/2022    HGB 10.5 (L) 11/19/2022    HCT 31.5 (L) 11/19/2022    MCV 91.0 11/19/2022     11/19/2022     Lab Results   Component Value Date    NEUTROABS 9.62 (H) 11/19/2022    NEUTROABS 10.87 (H) 11/18/2022    NEUTROABS 11.34 (H) 11/17/2022     No results found for: CRP  Lab Results   Component Value Date    ALT 44 (H) 11/19/2022    AST 45 (H) 11/19/2022    ALKPHOS 89 11/19/2022    BILITOT 0.6 11/19/2022     Lab Results   Component Value Date/Time     11/19/2022 05:13 AM    K 3.8 11/19/2022 05:13 AM    K 3.5 06/13/2021 03:00 AM     11/19/2022 05:13 AM    CO2 27 11/19/2022 05:13 AM    BUN 14 11/19/2022 05:13 AM    CREATININE 1.1 11/19/2022 05:13 AM    CREATININE 1.3 11/18/2022 05:45 AM    CREATININE 1.5 11/17/2022 03:32 AM    GFRAA >60 06/13/2021 03:00 AM    LABGLOM >60 11/19/2022 05:13 AM    GLUCOSE 110 11/19/2022 05:13 AM    PROT 5.6 11/19/2022 05:13 AM    LABALBU 2.5 11/19/2022 05:13 AM    CALCIUM 8.2 11/19/2022 05:13 AM    BILITOT 0.6 11/19/2022 05:13 AM    ALKPHOS 89 11/19/2022 05:13 AM    AST 45 11/19/2022 05:13 AM    ALT 44 11/19/2022 05:13 AM     Lab Results   Component Value Date    CRP 13.4 (H) 11/19/2022    CRP 13.4 (H) 11/18/2022    CRP 17.9 (H) 11/17/2022     Lab Results   Component Value Date    SEDRATE 63 (H) 11/18/2022    SEDRATE 70 (H) 11/17/2022     Radiology:      Microbiology:   Blood cultures 11/16/2022: Negative so far  Nares screen MRSA: Negative  Rapid SARS-CoV-2: Negative  Urine culture 11/16/2022: Not sent    Recent Labs     11/16/22  1154   PROCAL 24.81*       ASSESSMENT:  Status post cystoscopy with left stenting 11/11/2022 in [facility]  Complicated UTI with sepsis associated to urolithiasis and left ureteral stent  Sepsis with septic shock secondary to complicated UTI-resolved.   Urine specimen was never sent for culture  Fever secondary to UTI and sepsis-resolved  Leukocytosis secondary to complicated UTI-improving    PLAN:  Stop Cefepime. Start Levofloxacin and Cefdinir x10 days  I called microbiology today. They do not have a urine specimen for a culture  Follow-up with urology in [facility] with nursing.       Michael Roche MD  9:38 AM  11/19/2022

## 2022-11-19 NOTE — PLAN OF CARE
Problem: Discharge Planning  Goal: Discharge to home or other facility with appropriate resources  11/19/2022 0010 by Barbara Johnson RN  Outcome: Progressing  11/18/2022 1434 by Kala Gutierrez RN  Outcome: Progressing     Problem: Pain  Goal: Verbalizes/displays adequate comfort level or baseline comfort level  11/19/2022 0010 by Barbara Johnson RN  Outcome: Progressing  11/18/2022 1434 by Kala Gutierrez RN  Outcome: Progressing     Problem: Safety - Adult  Goal: Free from fall injury  11/19/2022 0010 by Barbara Johnson RN  Outcome: Progressing  11/18/2022 1434 by Kala Gutierrez RN  Outcome: Progressing     Problem: Skin/Tissue Integrity  Goal: Absence of new skin breakdown  Description: 1. Monitor for areas of redness and/or skin breakdown  2. Assess vascular access sites hourly  3. Every 4-6 hours minimum:  Change oxygen saturation probe site  4. Every 4-6 hours:  If on nasal continuous positive airway pressure, respiratory therapy assess nares and determine need for appliance change or resting period.   11/19/2022 0010 by Barbara Johnson RN  Outcome: Progressing  11/18/2022 1434 by Kala Gutierrez RN  Outcome: Progressing     Problem: ABCDS Injury Assessment  Goal: Absence of physical injury  11/19/2022 0010 by Barbara Johnson RN  Outcome: Progressing  11/18/2022 1434 by Kala Gutierrez RN  Outcome: Progressing     Problem: Chronic Conditions and Co-morbidities  Goal: Patient's chronic conditions and co-morbidity symptoms are monitored and maintained or improved  Outcome: Progressing

## 2022-11-19 NOTE — PROGRESS NOTES
[facility] [address]  Progress Note        Chief complaint :  Chief Complaint   Patient presents with    Flank Pain     Left, had stent placed last week, states \"I'm getting worse\"        Subjective:  Eder Adams was seen and examined resting comfortably in bed. States that he is feeling much better than he did when he was admitted and is excited to go home. He states that he is already talked to infectious disease this morning that his understanding that he is cleared to go home. Denies any new fever, chills, chest pain or pressure, shortness of breath, Rachid pain, change in bowel or bladder habits. Review of Systems   Constitutional:  Negative for chills and fever. Eyes:  Negative for redness and visual disturbance. Respiratory:  Negative for chest tightness and shortness of breath. Cardiovascular:  Negative for chest pain and palpitations. Gastrointestinal:  Negative for abdominal pain, nausea and vomiting. Endocrine: Negative for cold intolerance and heat intolerance. Genitourinary:  Negative for difficulty urinating and hematuria. Musculoskeletal:  Negative for gait problem and neck pain. Skin:  Negative for color change and rash. Neurological:  Negative for dizziness, light-headedness and headaches. Objective:  /84   Pulse 69   Temp 98.4 °F (36.9 °C) (Oral)   Resp 23   Ht 6' 3\" (1.905 m)   Wt 230 lb 9.6 oz (104.6 kg)   SpO2 97%   BMI 28.82 kg/m²     Physical Exam  Vitals reviewed. Constitutional:       Appearance: Normal appearance. He is not toxic-appearing or diaphoretic. HENT:      Head: Normocephalic and atraumatic. Right Ear: External ear normal.      Left Ear: External ear normal.      Nose: Nose normal.      Mouth/Throat:      Mouth: Mucous membranes are moist.      Pharynx: No oropharyngeal exudate. Eyes:      General: No scleral icterus. Extraocular Movements: Extraocular movements intact.    Cardiovascular: Rate and Rhythm: Normal rate and regular rhythm. Heart sounds: Normal heart sounds. No murmur heard. No friction rub. No gallop. Pulmonary:      Effort: Pulmonary effort is normal.      Breath sounds: Normal breath sounds. No wheezing or rales. Abdominal:      General: Abdomen is flat. Palpations: Abdomen is soft. There is no mass. Tenderness: There is no abdominal tenderness. There is no guarding. Musculoskeletal:         General: Normal range of motion. Cervical back: Normal range of motion and neck supple. Right lower leg: No edema. Left lower leg: No edema. Skin:     General: Skin is moist.   Neurological:      General: No focal deficit present. Mental Status: He is alert and oriented to person, place, and time. Psychiatric:         Mood and Affect: Mood normal.         Thought Content:  Thought content normal.       Labs:  Recent Results (from the past 24 hour(s))   POCT Glucose    Collection Time: 11/18/22 11:24 AM   Result Value Ref Range    Meter Glucose 112 (H) 74 - 99 mg/dL   POCT Glucose    Collection Time: 11/18/22  3:54 PM   Result Value Ref Range    Meter Glucose 79 74 - 99 mg/dL   POCT Glucose    Collection Time: 11/18/22  4:30 PM   Result Value Ref Range    Meter Glucose 135 (H) 74 - 99 mg/dL   POCT Glucose    Collection Time: 11/18/22  9:07 PM   Result Value Ref Range    Meter Glucose 81 74 - 99 mg/dL   CBC with Auto Differential    Collection Time: 11/19/22  5:13 AM   Result Value Ref Range    WBC 12.6 (H) 4.5 - 11.5 E9/L    RBC 3.46 (L) 3.80 - 5.80 E12/L    Hemoglobin 10.5 (L) 12.5 - 16.5 g/dL    Hematocrit 31.5 (L) 37.0 - 54.0 %    MCV 91.0 80.0 - 99.9 fL    MCH 30.3 26.0 - 35.0 pg    MCHC 33.3 32.0 - 34.5 %    RDW 14.3 11.5 - 15.0 fL    Platelets 898 895 - 675 E9/L    MPV 9.6 7.0 - 12.0 fL    Neutrophils % 76.2 43.0 - 80.0 %    Immature Granulocytes % 2.5 0.0 - 5.0 %    Lymphocytes % 9.7 (L) 20.0 - 42.0 %    Monocytes % 10.3 2.0 - 12.0 % Eosinophils % 1.0 0.0 - 6.0 %    Basophils % 0.3 0.0 - 2.0 %    Neutrophils Absolute 9.62 (H) 1.80 - 7.30 E9/L    Immature Granulocytes # 0.31 E9/L    Lymphocytes Absolute 1.22 (L) 1.50 - 4.00 E9/L    Monocytes Absolute 1.30 (H) 0.10 - 0.95 E9/L    Eosinophils Absolute 0.13 0.05 - 0.50 E9/L    Basophils Absolute 0.04 0.00 - 0.20 E9/L   C-Reactive Protein    Collection Time: 11/19/22  5:13 AM   Result Value Ref Range    CRP 13.4 (H) 0.0 - 0.4 mg/dL   Comprehensive Metabolic Panel    Collection Time: 11/19/22  5:13 AM   Result Value Ref Range    Sodium 135 132 - 146 mmol/L    Potassium 3.8 3.5 - 5.0 mmol/L    Chloride 101 98 - 107 mmol/L    CO2 27 22 - 29 mmol/L    Anion Gap 7 7 - 16 mmol/L    Glucose 110 (H) 74 - 99 mg/dL    BUN 14 6 - 20 mg/dL    Creatinine 1.1 0.7 - 1.2 mg/dL    Est, Glom Filt Rate >60 >=60 mL/min/1.73    Calcium 8.2 (L) 8.6 - 10.2 mg/dL    Total Protein 5.6 (L) 6.4 - 8.3 g/dL    Albumin 2.5 (L) 3.5 - 5.2 g/dL    Total Bilirubin 0.6 0.0 - 1.2 mg/dL    Alkaline Phosphatase 89 40 - 129 U/L    ALT 44 (H) 0 - 40 U/L    AST 45 (H) 0 - 39 U/L   Magnesium    Collection Time: 11/19/22  5:13 AM   Result Value Ref Range    Magnesium 1.8 1.6 - 2.6 mg/dL   Phosphorus    Collection Time: 11/19/22  5:13 AM   Result Value Ref Range    Phosphorus 2.8 2.5 - 4.5 mg/dL       Radiology and other tests reviewed:  XR CHEST PORTABLE   Final Result   Right IJ catheter in good position with no pneumothorax. Stable chest.         CT ABDOMEN PELVIS WO CONTRAST Additional Contrast? None   Final Result   1. Left double-J stent in place with no evidence of left urolithiasis. There   is minimal left hydronephrosis with left perirenal and periureteral stranding. 2. There is mild fat stranding adjacent to the urinary bladder and prostate   gland with enlargement of the prostate gland. Consider prostatitis or   cystitis. 3. Right nonobstructive nephrolithiasis. 4. Possible constipation. 5. Fatty liver. Assessment:  Active Hospital Problems    Diagnosis Date Noted    Septic shock ([facility] [address] [A41.9, R65.21] 11/16/2022     Priority: Medium    Hypomagnesemia [E83.42] 11/16/2022     Priority: Medium    Ureteral stent present [Z96.0] 11/16/2022     Priority: Medium    Hypokalemia [E87.6] 11/16/2022     Priority: Medium    Sepsis ([facility] [address] [A41.9] 11/16/2022     Priority: Medium       Plan:    Sepsis to septic shock secondary to complicated UTI, resolved  ID following, appreciate input: Discontinue cefepime. Final antibiotic regimen of levofloxacin and cefdinir for 10 days.   Encourage follow-up with urology, patient states that he will be establishing with Dr. Roger Ambrose disorder  Hold home medications, continue at discharge    Type 2 diabetes mellitus  Continue current medical regimen, resume Ozempic at discharge      Disposition: Discharge to home today    Slim Thompson MD   Family Medicine Resident PGY-1  11/19/22   7:42 AM

## 2022-11-19 NOTE — PROGRESS NOTES
Discharge instructions reviewed with patient. No questions at this time. IV removed. Belongings packed up by patient. Patient dressed and walked to the front door by myself.  Discharged home with wife

## 2022-11-19 NOTE — DISCHARGE SUMMARY
Physician Discharge Summary  [facility]     Patient ID:  June Del Real  [id number]  61 y.o.  1965    Admit date: 11/16/2022    Discharge date: 11/19/2022    Admission Diagnoses:   Hypokalemia [E87.6]  Hypomagnesemia [E83.42]  Septic shock ([facility] [address] [A41.9, R65.21]  Sepsis, due to unspecified organism, unspecified whether acute organ dysfunction present [facility]) [A41.9]  Ureteral stent present [Z96.0]    Discharge Diagnoses:  Principal Problem:    Septic shock ([facility] [address]  Active Problems:    Hypomagnesemia    Ureteral stent present    Hypokalemia    Sepsis ([facility] [address]  Resolved Problems:    * No resolved hospital problems. *      Consults: ID and urology  Procedures: CVC placement, right IJ    Hospital Course: June Del Real was admitted for septic shock secondary to urosepsis. Patient was initially hypotensive and unresponsive to fluid boluses in the emergency department, requiring placement of CVC and a brief period of pressor support and for this reason was admitted to the ICU. Patient was able to be weaned off of pressors in little more than 2 hours time. Antibiotic treatment was chosen with cefepime and patient continued to show improvement with antibiotic therapy. Urology was consulted and did not have plan for inpatient intervention. However, patient will need to follow-up with urology outpatient for cystoscopy and stent removal after resolution of his acute infection. Infectious disease was consulted and recommended final antibiotic course of 10 days of cefdinir and Levaquin as no urine culture was able to be conducted. On the date of discharge, patient was hemodynamically stable and both he and his wife endorsed much improvement since admission. Patient was discharged in a stable and improved condition. Significant Diagnostic Studies:   XR CHEST PORTABLE   Final Result   Right IJ catheter in good position with no pneumothorax.       Stable chest.         CT ABDOMEN PELVIS WO CONTRAST Additional Contrast? None   Final Result   1. Left double-J stent in place with no evidence of left urolithiasis. There   is minimal left hydronephrosis with left perirenal and periureteral stranding. 2. There is mild fat stranding adjacent to the urinary bladder and prostate   gland with enlargement of the prostate gland. Consider prostatitis or   cystitis. 3. Right nonobstructive nephrolithiasis. 4. Possible constipation. 5. Fatty liver. Post Discharge Follow up:  Complete 10-day course of both antibiotics. Follow-up with primary care provider. Follow-up with urology for outpatient cystoscopy with removal of stent. Medication changes:  Complete 10-day course of Levaquin and cefdinir. Discontinue oxycodone use. Discharge Exam:     Physical Exam  Vitals reviewed. Constitutional:       Appearance: Normal appearance. He is not toxic-appearing or diaphoretic. HENT:      Head: Normocephalic and atraumatic. Right Ear: External ear normal.      Left Ear: External ear normal.      Nose: Nose normal.      Mouth/Throat:      Mouth: Mucous membranes are moist.      Pharynx: No oropharyngeal exudate. Eyes:      General: No scleral icterus. Extraocular Movements: Extraocular movements intact. Cardiovascular:      Rate and Rhythm: Normal rate and regular rhythm. Heart sounds: Normal heart sounds. No murmur heard. No friction rub. No gallop. Pulmonary:      Effort: Pulmonary effort is normal.      Breath sounds: Normal breath sounds. No wheezing or rales. Abdominal:      General: Abdomen is flat. Palpations: Abdomen is soft. There is no mass. Tenderness: There is no abdominal tenderness. There is no guarding. Musculoskeletal:         General: Normal range of motion. Cervical back: Normal range of motion and neck supple. Right lower leg: No edema. Left lower leg: No edema.    Skin:     General: Skin is moist.   Neurological: General: No focal deficit present. Mental Status: He is alert and oriented to person, place, and time. Psychiatric:         Mood and Affect: Mood normal.         Thought Content: Thought content normal.     Disposition: home  Patient condition: stable    Patient Instructions: Activity: activity as tolerated  Diet: regular diet    Discharge Medication List:    Discharge Medication List as of 11/19/2022 11:08 AM        CONTINUE these medications which have NOT CHANGED    Details   amphetamine-dextroamphetamine (ADDERALL) 20 MG tablet Take 20 mg by mouth 3 times daily as needed (TO IMPROVE FOCUS). Historical Med      ondansetron (ZOFRAN-ODT) 4 MG disintegrating tablet Take 4 mg by mouth every 8 hours as needed for Nausea or VomitingHistorical Med      oxybutynin (DITROPAN-XL) 10 MG extended release tablet Take 10 mg by mouth daily as needed (BLADDER SPASMS)Historical Med      Semaglutide, 1 MG/DOSE, (OZEMPIC, 1 MG/DOSE,) 4 MG/3ML SOPN Inject 1 mg into the skin once a week **SATURDAYS**Historical Med      sildenafil (VIAGRA) 100 MG tablet Take 100 mg by mouth as needed for Erectile DysfunctionHistorical Med      tamsulosin (FLOMAX) 0.4 MG capsule Take 0.4 mg by mouth every morningHistorical Med      clonazePAM (KLONOPIN) 0.5 MG tablet Take 0.5 mg by mouth 2 times daily as needed for Anxiety. Historical Med      famotidine (PEPCID) 40 MG tablet Take 40 mg by mouth daily as needed (HEARTBURN)Historical Med      DULoxetine (CYMBALTA) 60 MG extended release capsule Take 60 mg by mouth nightlyHistorical Med      QUEtiapine (SEROQUEL) 200 MG tablet Take 200 mg by mouth nightlyHistorical Med            Discharge Medication List as of 11/19/2022 11:08 AM        START taking these medications    Details   cefdinir (OMNICEF) 300 MG capsule Take 1 capsule by mouth every 12 hours for 10 days, Disp-20 capsule, R-0Normal      levoFLOXacin (LEVAQUIN) 500 MG tablet Take 1 tablet by mouth daily for 10 days, Disp-10 tablet, R-0Normal      lactobacillus (CULTURELLE) CAPS capsule Take 1 capsule by mouth daily, Disp-30 capsule, R-0Normal            Discharge Medication List as of 11/19/2022 11:08 AM        STOP taking these medications       oxyCODONE (ROXICODONE) 5 MG immediate release tablet Comments:   Reason for Stopping:             Discharge Medication List as of 11/19/2022 11:08 AM            Follow up:  Viraj Collazo    Schedule an appointment as soon as possible for a visit in 1 week(s)      Betty Carr MD  [address] [facility] [phone]    Follow up      Jina Brock MD  Family Medicine Resident PGY-1  11/19/22   5:14 PM

## 2022-11-19 NOTE — PROGRESS NOTES
Intensive Care Daily Quality Rounding Checklist      ICU Team Members: Bedside Nurse and Charge Nurse    ICU Day #: N/A    Intubation Date:  N/A    Ventilator Day #: N/A    Central Line Insertion Date: November 16        Day #: NUMBER: 3     Arterial Line Insertion Date:  N/A       Temporary Hemodialysis Catheter Insertion Date:  N/A       DVT Prophylaxis: Lovenox    GI Prophylaxis:N/A    Gerard Catheter Insertion Date:  N/A         Continued need (if yes, reason documented and discussed with physician): N/A    Skin Issues/ Wounds and ordered treatment discussed on rounds:     Goals/ Plans for the Day:  Monitor labs, treat/replace as needed. Transfer to medical bed once bed becomes available.

## 2022-11-20 ENCOUNTER — HOSPITAL ENCOUNTER (INPATIENT)
Age: 57
LOS: 3 days | Discharge: HOME | DRG: 871 | End: 2022-11-23
Attending: EMERGENCY MEDICINE | Admitting: STUDENT IN AN ORGANIZED HEALTH CARE EDUCATION/TRAINING PROGRAM
Payer: MEDICARE

## 2022-11-20 ENCOUNTER — APPOINTMENT (OUTPATIENT)
Dept: GENERAL RADIOLOGY | Age: 57
DRG: 871 | End: 2022-11-20
Payer: MEDICARE

## 2022-11-20 ENCOUNTER — APPOINTMENT (OUTPATIENT)
Dept: CT IMAGING | Age: 57
DRG: 871 | End: 2022-11-20
Payer: MEDICARE

## 2022-11-20 DIAGNOSIS — M89.9 DISORDER OF BONE: ICD-10-CM

## 2022-11-20 DIAGNOSIS — J90 PLEURAL EFFUSION: ICD-10-CM

## 2022-11-20 DIAGNOSIS — A41.9 SEPSIS WITHOUT ACUTE ORGAN DYSFUNCTION, DUE TO UNSPECIFIED ORGANISM (HCC): Primary | ICD-10-CM

## 2022-11-20 DIAGNOSIS — E78.3 HYPERCHYLOMICRONEMIA: ICD-10-CM

## 2022-11-20 LAB
ALBUMIN SERPL-MCNC: 2.8 G/DL (ref 3.5–5.2)
ALP BLD-CCNC: 79 U/L (ref 40–129)
ALT SERPL-CCNC: 70 U/L (ref 0–40)
ANION GAP SERPL CALCULATED.3IONS-SCNC: 11 MMOL/L (ref 7–16)
AST SERPL-CCNC: 98 U/L (ref 0–39)
ATYPICAL LYMPHOCYTE RELATIVE PERCENT: 2 % (ref 0–4)
BACTERIA: ABNORMAL /HPF
BASOPHILS ABSOLUTE: 0 E9/L (ref 0–0.2)
BASOPHILS RELATIVE PERCENT: 0 % (ref 0–2)
BILIRUB SERPL-MCNC: 0.9 MG/DL (ref 0–1.2)
BILIRUBIN URINE: NEGATIVE
BLOOD, URINE: ABNORMAL
BUN BLDV-MCNC: 20 MG/DL (ref 6–20)
CALCIUM SERPL-MCNC: 8.6 MG/DL (ref 8.6–10.2)
CHLORIDE BLD-SCNC: 97 MMOL/L (ref 98–107)
CLARITY: CLEAR
CO2: 27 MMOL/L (ref 22–29)
COLOR: YELLOW
CREAT SERPL-MCNC: 1.4 MG/DL (ref 0.7–1.2)
EOSINOPHILS ABSOLUTE: 0.24 E9/L (ref 0.05–0.5)
EOSINOPHILS RELATIVE PERCENT: 2 % (ref 0–6)
GFR SERPL CREATININE-BSD FRML MDRD: 58 ML/MIN/1.73
GLUCOSE BLD-MCNC: 109 MG/DL (ref 74–99)
GLUCOSE URINE: NEGATIVE MG/DL
HCT VFR BLD CALC: 33.5 % (ref 37–54)
HEMOGLOBIN: 11.1 G/DL (ref 12.5–16.5)
KETONES, URINE: NEGATIVE MG/DL
LACTIC ACID, SEPSIS: 1.4 MMOL/L (ref 0.5–1.9)
LEUKOCYTE ESTERASE, URINE: ABNORMAL
LYMPHOCYTES ABSOLUTE: 0.48 E9/L (ref 1.5–4)
LYMPHOCYTES RELATIVE PERCENT: 2 % (ref 20–42)
MCH RBC QN AUTO: 29.6 PG (ref 26–35)
MCHC RBC AUTO-ENTMCNC: 33.1 % (ref 32–34.5)
MCV RBC AUTO: 89.3 FL (ref 80–99.9)
METAMYELOCYTES RELATIVE PERCENT: 2 % (ref 0–1)
MONOCYTES ABSOLUTE: 0.24 E9/L (ref 0.1–0.95)
MONOCYTES RELATIVE PERCENT: 2 % (ref 2–12)
NEUTROPHILS ABSOLUTE: 11.04 E9/L (ref 1.8–7.3)
NEUTROPHILS RELATIVE PERCENT: 90 % (ref 43–80)
NITRITE, URINE: NEGATIVE
ORGANISM: ABNORMAL
PDW BLD-RTO: 14.2 FL (ref 11.5–15)
PH UA: 6 (ref 5–9)
PLATELET # BLD: 338 E9/L (ref 130–450)
PMV BLD AUTO: 9.1 FL (ref 7–12)
POIKILOCYTES: ABNORMAL
POLYCHROMASIA: ABNORMAL
POTASSIUM SERPL-SCNC: 3.6 MMOL/L (ref 3.5–5)
PROTEIN UA: ABNORMAL MG/DL
RBC # BLD: 3.75 E12/L (ref 3.8–5.8)
RBC UA: ABNORMAL /HPF (ref 0–2)
SARS-COV-2, NAAT: NOT DETECTED
SODIUM BLD-SCNC: 135 MMOL/L (ref 132–146)
SPECIFIC GRAVITY UA: 1.02 (ref 1–1.03)
TOTAL PROTEIN: 5.9 G/DL (ref 6.4–8.3)
TROPONIN, HIGH SENSITIVITY: 18 NG/L (ref 0–11)
TROPONIN, HIGH SENSITIVITY: 23 NG/L (ref 0–11)
URINE CULTURE, ROUTINE: ABNORMAL
URINE CULTURE, ROUTINE: ABNORMAL
UROBILINOGEN, URINE: 0.2 E.U./DL
WBC # BLD: 12 E9/L (ref 4.5–11.5)
WBC UA: ABNORMAL /HPF (ref 0–5)

## 2022-11-20 PROCEDURE — 87186 SC STD MICRODIL/AGAR DIL: CPT

## 2022-11-20 PROCEDURE — 6360000002 HC RX W HCPCS: Performed by: STUDENT IN AN ORGANIZED HEALTH CARE EDUCATION/TRAINING PROGRAM

## 2022-11-20 PROCEDURE — 93005 ELECTROCARDIOGRAM TRACING: CPT

## 2022-11-20 PROCEDURE — 96375 TX/PRO/DX INJ NEW DRUG ADDON: CPT

## 2022-11-20 PROCEDURE — 81001 URINALYSIS AUTO W/SCOPE: CPT

## 2022-11-20 PROCEDURE — 6360000002 HC RX W HCPCS: Performed by: EMERGENCY MEDICINE

## 2022-11-20 PROCEDURE — 6370000000 HC RX 637 (ALT 250 FOR IP): Performed by: STUDENT IN AN ORGANIZED HEALTH CARE EDUCATION/TRAINING PROGRAM

## 2022-11-20 PROCEDURE — 74176 CT ABD & PELVIS W/O CONTRAST: CPT

## 2022-11-20 PROCEDURE — 2580000003 HC RX 258: Performed by: EMERGENCY MEDICINE

## 2022-11-20 PROCEDURE — 80053 COMPREHEN METABOLIC PANEL: CPT

## 2022-11-20 PROCEDURE — 85025 COMPLETE CBC W/AUTO DIFF WBC: CPT

## 2022-11-20 PROCEDURE — 96365 THER/PROPH/DIAG IV INF INIT: CPT

## 2022-11-20 PROCEDURE — 87635 SARS-COV-2 COVID-19 AMP PRB: CPT

## 2022-11-20 PROCEDURE — 94640 AIRWAY INHALATION TREATMENT: CPT

## 2022-11-20 PROCEDURE — 36415 COLL VENOUS BLD VENIPUNCTURE: CPT

## 2022-11-20 PROCEDURE — 2580000003 HC RX 258: Performed by: STUDENT IN AN ORGANIZED HEALTH CARE EDUCATION/TRAINING PROGRAM

## 2022-11-20 PROCEDURE — 99285 EMERGENCY DEPT VISIT HI MDM: CPT

## 2022-11-20 PROCEDURE — 83605 ASSAY OF LACTIC ACID: CPT

## 2022-11-20 PROCEDURE — 94664 DEMO&/EVAL PT USE INHALER: CPT

## 2022-11-20 PROCEDURE — 84484 ASSAY OF TROPONIN QUANT: CPT

## 2022-11-20 PROCEDURE — 99223 1ST HOSP IP/OBS HIGH 75: CPT | Performed by: STUDENT IN AN ORGANIZED HEALTH CARE EDUCATION/TRAINING PROGRAM

## 2022-11-20 PROCEDURE — 6370000000 HC RX 637 (ALT 250 FOR IP): Performed by: EMERGENCY MEDICINE

## 2022-11-20 PROCEDURE — 87040 BLOOD CULTURE FOR BACTERIA: CPT

## 2022-11-20 PROCEDURE — 71045 X-RAY EXAM CHEST 1 VIEW: CPT

## 2022-11-20 PROCEDURE — 2060000000 HC ICU INTERMEDIATE R&B

## 2022-11-20 PROCEDURE — 2700000000 HC OXYGEN THERAPY PER DAY

## 2022-11-20 RX ORDER — ONDANSETRON 2 MG/ML
4 INJECTION INTRAMUSCULAR; INTRAVENOUS EVERY 6 HOURS PRN
Status: DISCONTINUED | OUTPATIENT
Start: 2022-11-20 | End: 2022-11-23 | Stop reason: HOSPADM

## 2022-11-20 RX ORDER — TAMSULOSIN HYDROCHLORIDE 0.4 MG/1
0.4 CAPSULE ORAL EVERY MORNING
Status: DISCONTINUED | OUTPATIENT
Start: 2022-11-21 | End: 2022-11-23 | Stop reason: HOSPADM

## 2022-11-20 RX ORDER — OXYCODONE HYDROCHLORIDE 5 MG/1
5 TABLET ORAL EVERY 6 HOURS PRN
Status: DISCONTINUED | OUTPATIENT
Start: 2022-11-20 | End: 2022-11-23 | Stop reason: HOSPADM

## 2022-11-20 RX ORDER — QUETIAPINE FUMARATE 100 MG/1
200 TABLET, FILM COATED ORAL NIGHTLY
Status: DISCONTINUED | OUTPATIENT
Start: 2022-11-21 | End: 2022-11-23 | Stop reason: HOSPADM

## 2022-11-20 RX ORDER — ONDANSETRON 4 MG/1
4 TABLET, ORALLY DISINTEGRATING ORAL EVERY 8 HOURS PRN
Status: DISCONTINUED | OUTPATIENT
Start: 2022-11-20 | End: 2022-11-20 | Stop reason: SDUPTHER

## 2022-11-20 RX ORDER — KETOROLAC TROMETHAMINE 30 MG/ML
15 INJECTION, SOLUTION INTRAMUSCULAR; INTRAVENOUS ONCE
Status: COMPLETED | OUTPATIENT
Start: 2022-11-20 | End: 2022-11-20

## 2022-11-20 RX ORDER — ACETAMINOPHEN 500 MG
1000 TABLET ORAL ONCE
Status: COMPLETED | OUTPATIENT
Start: 2022-11-20 | End: 2022-11-20

## 2022-11-20 RX ORDER — ACETAMINOPHEN 325 MG/1
650 TABLET ORAL EVERY 6 HOURS PRN
Status: DISCONTINUED | OUTPATIENT
Start: 2022-11-20 | End: 2022-11-23 | Stop reason: HOSPADM

## 2022-11-20 RX ORDER — ENOXAPARIN SODIUM 100 MG/ML
30 INJECTION SUBCUTANEOUS 2 TIMES DAILY
Status: DISCONTINUED | OUTPATIENT
Start: 2022-11-20 | End: 2022-11-23 | Stop reason: HOSPADM

## 2022-11-20 RX ORDER — SODIUM CHLORIDE 9 MG/ML
INJECTION, SOLUTION INTRAVENOUS ONCE
Status: COMPLETED | OUTPATIENT
Start: 2022-11-20 | End: 2022-11-20

## 2022-11-20 RX ORDER — AZITHROMYCIN 250 MG/1
500 TABLET, FILM COATED ORAL DAILY
Status: DISCONTINUED | OUTPATIENT
Start: 2022-11-20 | End: 2022-11-21

## 2022-11-20 RX ORDER — SODIUM CHLORIDE 0.9 % (FLUSH) 0.9 %
5-40 SYRINGE (ML) INJECTION PRN
Status: DISCONTINUED | OUTPATIENT
Start: 2022-11-20 | End: 2022-11-23 | Stop reason: SDUPTHER

## 2022-11-20 RX ORDER — ONDANSETRON 4 MG/1
4 TABLET, ORALLY DISINTEGRATING ORAL EVERY 8 HOURS PRN
Status: DISCONTINUED | OUTPATIENT
Start: 2022-11-20 | End: 2022-11-23 | Stop reason: HOSPADM

## 2022-11-20 RX ORDER — SODIUM CHLORIDE 9 MG/ML
INJECTION, SOLUTION INTRAVENOUS PRN
Status: DISCONTINUED | OUTPATIENT
Start: 2022-11-20 | End: 2022-11-23 | Stop reason: SDUPTHER

## 2022-11-20 RX ORDER — CLONAZEPAM 0.5 MG/1
0.5 TABLET ORAL 2 TIMES DAILY PRN
Status: DISCONTINUED | OUTPATIENT
Start: 2022-11-20 | End: 2022-11-23 | Stop reason: HOSPADM

## 2022-11-20 RX ORDER — SODIUM CHLORIDE 9 MG/ML
INJECTION, SOLUTION INTRAVENOUS CONTINUOUS
Status: DISCONTINUED | OUTPATIENT
Start: 2022-11-20 | End: 2022-11-21

## 2022-11-20 RX ORDER — LACTOBACILLUS RHAMNOSUS GG 10B CELL
1 CAPSULE ORAL DAILY
Status: DISCONTINUED | OUTPATIENT
Start: 2022-11-21 | End: 2022-11-23 | Stop reason: HOSPADM

## 2022-11-20 RX ORDER — FAMOTIDINE 20 MG/1
40 TABLET, FILM COATED ORAL DAILY PRN
Status: DISCONTINUED | OUTPATIENT
Start: 2022-11-20 | End: 2022-11-23 | Stop reason: HOSPADM

## 2022-11-20 RX ORDER — DEXTROAMPHETAMINE SACCHARATE, AMPHETAMINE ASPARTATE, DEXTROAMPHETAMINE SULFATE AND AMPHETAMINE SULFATE 5; 5; 5; 5 MG/1; MG/1; MG/1; MG/1
20 TABLET ORAL 3 TIMES DAILY PRN
Status: DISCONTINUED | OUTPATIENT
Start: 2022-11-20 | End: 2022-11-23 | Stop reason: HOSPADM

## 2022-11-20 RX ORDER — OXYBUTYNIN CHLORIDE 10 MG/1
10 TABLET, EXTENDED RELEASE ORAL DAILY PRN
Status: DISCONTINUED | OUTPATIENT
Start: 2022-11-20 | End: 2022-11-23 | Stop reason: HOSPADM

## 2022-11-20 RX ORDER — SODIUM CHLORIDE 0.9 % (FLUSH) 0.9 %
5-40 SYRINGE (ML) INJECTION EVERY 12 HOURS SCHEDULED
Status: DISCONTINUED | OUTPATIENT
Start: 2022-11-20 | End: 2022-11-23 | Stop reason: SDUPTHER

## 2022-11-20 RX ORDER — POLYETHYLENE GLYCOL 3350 17 G/17G
17 POWDER, FOR SOLUTION ORAL DAILY PRN
Status: DISCONTINUED | OUTPATIENT
Start: 2022-11-20 | End: 2022-11-23 | Stop reason: HOSPADM

## 2022-11-20 RX ORDER — ACETAMINOPHEN 650 MG/1
650 SUPPOSITORY RECTAL EVERY 6 HOURS PRN
Status: DISCONTINUED | OUTPATIENT
Start: 2022-11-20 | End: 2022-11-23 | Stop reason: HOSPADM

## 2022-11-20 RX ORDER — IPRATROPIUM BROMIDE AND ALBUTEROL SULFATE 2.5; .5 MG/3ML; MG/3ML
1 SOLUTION RESPIRATORY (INHALATION)
Status: DISCONTINUED | OUTPATIENT
Start: 2022-11-20 | End: 2022-11-23 | Stop reason: HOSPADM

## 2022-11-20 RX ORDER — DULOXETIN HYDROCHLORIDE 60 MG/1
60 CAPSULE, DELAYED RELEASE ORAL NIGHTLY
Status: DISCONTINUED | OUTPATIENT
Start: 2022-11-21 | End: 2022-11-23 | Stop reason: HOSPADM

## 2022-11-20 RX ADMIN — CEFEPIME 2000 MG: 2 INJECTION, POWDER, FOR SOLUTION INTRAVENOUS at 17:41

## 2022-11-20 RX ADMIN — ENOXAPARIN SODIUM 30 MG: 100 INJECTION SUBCUTANEOUS at 21:00

## 2022-11-20 RX ADMIN — AZITHROMYCIN 500 MG: 250 TABLET, FILM COATED ORAL at 21:58

## 2022-11-20 RX ADMIN — IPRATROPIUM BROMIDE AND ALBUTEROL SULFATE 1 AMPULE: 2.5; .5 SOLUTION RESPIRATORY (INHALATION) at 21:05

## 2022-11-20 RX ADMIN — SODIUM CHLORIDE: 9 INJECTION, SOLUTION INTRAVENOUS at 20:34

## 2022-11-20 RX ADMIN — SODIUM CHLORIDE, PRESERVATIVE FREE 10 ML: 5 INJECTION INTRAVENOUS at 21:59

## 2022-11-20 RX ADMIN — SODIUM CHLORIDE: 9 INJECTION, SOLUTION INTRAVENOUS at 17:23

## 2022-11-20 RX ADMIN — ACETAMINOPHEN 1000 MG: 500 TABLET ORAL at 17:37

## 2022-11-20 RX ADMIN — KETOROLAC TROMETHAMINE 15 MG: 30 INJECTION, SOLUTION INTRAMUSCULAR at 17:38

## 2022-11-20 ASSESSMENT — PAIN - FUNCTIONAL ASSESSMENT: PAIN_FUNCTIONAL_ASSESSMENT: NONE - DENIES PAIN

## 2022-11-20 ASSESSMENT — PAIN SCALES - GENERAL
PAINLEVEL_OUTOF10: 0
PAINLEVEL_OUTOF10: 8

## 2022-11-20 ASSESSMENT — PAIN DESCRIPTION - DESCRIPTORS: DESCRIPTORS: ACHING

## 2022-11-20 ASSESSMENT — PAIN DESCRIPTION - PAIN TYPE: TYPE: ACUTE PAIN

## 2022-11-20 ASSESSMENT — PAIN DESCRIPTION - ORIENTATION: ORIENTATION: LEFT

## 2022-11-20 ASSESSMENT — PAIN DESCRIPTION - LOCATION: LOCATION: ABDOMEN

## 2022-11-20 NOTE — ED TRIAGE NOTES
FIRST PROVIDER CONTACT ASSESSMENT NOTE       Department of Emergency Medicine                 First Provider Note            22  4:41 PM EST    Date of Encounter: No admission date for patient encounter. Patient Name: Michelle Mix  : 1965  MRN: [id number]    Chief Complaint: No chief complaint on file. History of Present Illness:   Michelle Mix is a 61 y.o. male who presents to the ED for fever/chills. Pt just sent home yesterday following hospital course for sepsis. This was secondary to renal calculus. Has stent in place, stone is gone . Just got home, picked up abs last night. Name unknown. Now with recurrent fever and shaking chills. Focused Physical Exam:  VS:    ED Triage Vitals   BP Temp Temp src Pulse Resp SpO2 Height Weight   -- -- -- -- -- -- -- --        Physical Ex: Constitutional: Alert and non-toxic. Medical History:  has a past medical history of Anxiety, Depression, Diabetes mellitus ([address] Fibromyalgia, Left retinal detachment, Metabolic syndrome, and Obesity. Surgical History:  has a past surgical history that includes Cholecystectomy; Umbilical hernia repair (); Vasectomy (); and Retinal detachment surgery (Left, 2020). Social History:  reports that he has never smoked. He has never used smokeless tobacco. He reports that he does not drink alcohol and does not use drugs. Family History: Family history is unknown by patient.     Allergies: Statins     Initial Plan of Care: Initiate Treatment-Testing, Proceed toTreatment Area When Bed Available for ED Attending/MLP to Continue Care      ---END OF FIRST PROVIDER CONTACT ASSESSMENT NOTE---  Electronically signed by Mina Etienne PA-C   DD: 22

## 2022-11-20 NOTE — ED PROVIDER NOTES
HPI:  11/20/22,   Time: 5:21 PM GAYLA Adams is a 61 y.o. male presenting to the ED for nausea vomiting fever left-sided flank pain, beginning a few hours ago. The complaint has been persistent, severe in severity, and worsened by nothing. He was recently admitted with a urinary tract infection and associated sepsis requiring pressors and ICU admission. Patient was just discharged yesterday he went home he took his first dose of antibiotics he started vomiting and developed a fever and presents back to the [facility] emergency department    ROS:   Pertinent positives and negatives are stated within HPI, all other systems reviewed and are negative.  --------------------------------------------- PAST HISTORY ---------------------------------------------  Past Medical History:  has a past medical history of Anxiety, Depression, Diabetes mellitus ([address] Fibromyalgia, Left retinal detachment, Metabolic syndrome, and Obesity. Past Surgical History:  has a past surgical history that includes Cholecystectomy; Umbilical hernia repair (2015); Vasectomy (2004); and Retinal detachment surgery (Left, 8/28/2020). Social History:  reports that he has never smoked. He has never used smokeless tobacco. He reports that he does not drink alcohol and does not use drugs. Family History: Family history is unknown by patient. The patients home medications have been reviewed.     Allergies: Statins    -------------------------------------------------- RESULTS -------------------------------------------------  All laboratory and radiology results have been personally reviewed by myself   LABS:  Results for orders placed or performed during the hospital encounter of 11/20/22   COVID-19, Rapid    Specimen: Nasopharyngeal Swab   Result Value Ref Range    SARS-CoV-2, NAAT Not Detected Not Detected   CBC with Auto Differential   Result Value Ref Range    WBC 12.0 (H) 4.5 - 11.5 E9/L    RBC 3.75 (L) 3.80 - 5.80 E12/L Hemoglobin 11.1 (L) 12.5 - 16.5 g/dL    Hematocrit 33.5 (L) 37.0 - 54.0 %    MCV 89.3 80.0 - 99.9 fL    MCH 29.6 26.0 - 35.0 pg    MCHC 33.1 32.0 - 34.5 %    RDW 14.2 11.5 - 15.0 fL    Platelets 025 124 - 812 E9/L    MPV 9.1 7.0 - 12.0 fL    Neutrophils % 90.0 (H) 43.0 - 80.0 %    Lymphocytes % 2.0 (L) 20.0 - 42.0 %    Monocytes % 2.0 2.0 - 12.0 %    Eosinophils % 2.0 0.0 - 6.0 %    Basophils % 0.0 0.0 - 2.0 %    Neutrophils Absolute 11.04 (H) 1.80 - 7.30 E9/L    Lymphocytes Absolute 0.48 (L) 1.50 - 4.00 E9/L    Monocytes Absolute 0.24 0.10 - 0.95 E9/L    Eosinophils Absolute 0.24 0.05 - 0.50 E9/L    Basophils Absolute 0.00 0.00 - 0.20 E9/L    Atypical Lymphocytes Relative 2.0 0.0 - 4.0 %    Metamyelocytes Relative 2.0 (H) 0.0 - 1.0 %    Polychromasia 1+     Poikilocytes 1+    Comprehensive Metabolic Panel   Result Value Ref Range    Sodium 135 132 - 146 mmol/L    Potassium 3.6 3.5 - 5.0 mmol/L    Chloride 97 (L) 98 - 107 mmol/L    CO2 27 22 - 29 mmol/L    Anion Gap 11 7 - 16 mmol/L    Glucose 109 (H) 74 - 99 mg/dL    BUN 20 6 - 20 mg/dL    Creatinine 1.4 (H) 0.7 - 1.2 mg/dL    Est, Glom Filt Rate 58 >=60 mL/min/1.73    Calcium 8.6 8.6 - 10.2 mg/dL    Total Protein 5.9 (L) 6.4 - 8.3 g/dL    Albumin 2.8 (L) 3.5 - 5.2 g/dL    Total Bilirubin 0.9 0.0 - 1.2 mg/dL    Alkaline Phosphatase 79 40 - 129 U/L    ALT 70 (H) 0 - 40 U/L    AST 98 (H) 0 - 39 U/L   Troponin   Result Value Ref Range    Troponin, High Sensitivity 23 (H) 0 - 11 ng/L   Lactate, Sepsis   Result Value Ref Range    Lactic Acid, Sepsis 1.4 0.5 - 1.9 mmol/L   Urinalysis   Result Value Ref Range    Color, UA Yellow Straw/Yellow    Clarity, UA Clear Clear    Glucose, Ur Negative Negative mg/dL    Bilirubin Urine Negative Negative    Ketones, Urine Negative Negative mg/dL    Specific Gravity, UA 1.025 1.005 - 1.030    Blood, Urine TRACE-INTACT Negative    pH, UA 6.0 5.0 - 9.0    Protein, UA TRACE Negative mg/dL    Urobilinogen, Urine 0.2 <2.0 E.U./dL Nitrite, Urine Negative Negative    Leukocyte Esterase, Urine TRACE (A) Negative   Troponin   Result Value Ref Range    Troponin, High Sensitivity 18 (H) 0 - 11 ng/L   Microscopic Urinalysis   Result Value Ref Range    WBC, UA 5-10 (A) 0 - 5 /HPF    RBC, UA 1-3 0 - 2 /HPF    Bacteria, UA MODERATE (A) None Seen /HPF       RADIOLOGY:  Interpreted by Radiologist.  CT ABDOMEN PELVIS WO CONTRAST Additional Contrast? None   Final Result   1. Stable, satisfactory position of left ureteral stent. 2. Redemonstration of mild left hydronephrosis and left perinephric edema. 3. Nonobstructing right renal calculus. 4. Diverticulosis. 5. New bilateral pleural effusions with new opacities in posterior lower   lobes related to atelectasis or pneumonia. 6. Hypoattenuating lesions associated with both kidneys likely represent   cysts. Ultrasound recommended for further evaluation. XR CHEST PORTABLE   Final Result   1. No acute cardiopulmonary pathology seen. Stable atherosclerotic disease. 2.  Stable rib fracture on the right. Interval removal of right IJ catheter. ------------------------- NURSING NOTES AND VITALS REVIEWED ---------------------------   The nursing notes within the ED encounter and vital signs as below have been reviewed. /66   Pulse (!) 107   Temp 100.4 °F (38 °C) (Oral)   Resp 18   Ht 6' 3\" (1.905 m)   Wt 230 lb (104.3 kg)   SpO2 92%   BMI 28.75 kg/m²   Oxygen Saturation Interpretation: Normal      ---------------------------------------------------PHYSICAL EXAM--------------------------------------      Constitutional/General: Alert and oriented x3, well appearing, non toxic in NAD  Head: NC/AT  Eyes: PERRL, EOMI  Mouth: Oropharynx clear, handling secretions, no trismus  Neck: Supple, full ROM, no meningeal signs  Pulmonary: Lungs clear to auscultation bilaterally, no wheezes, rales, or rhonchi.  Not in respiratory distress  Cardiovascular:  Regular tachycardic rate and rhythm, no murmurs, gallops, or rubs. 2+ distal pulses  Abdomen: Soft, not distended diffusely tender without guarding or rebound  Extremities: Moves all extremities x 4. Warm and well perfused  Skin: warm and dry without rash  Neurologic: GCS 15,  Psych: Normal Affect    EKG: This EKG is signed and interpreted by me. Rate: 129  Rhythm: Sinus  Interpretation: sinus tachycardia  Comparison: stable as compared to patient's most recent EKG    ------------------------------ ED COURSE/MEDICAL DECISION MAKING----------------------  Medications   0.9 % sodium chloride infusion (0 mL/hr IntraVENous Stopped 11/20/22 1812)   acetaminophen (TYLENOL) tablet 1,000 mg (1,000 mg Oral Given 11/20/22 1737)   cefepime (MAXIPIME) 2000 mg IVPB minibag (0 mg IntraVENous Stopped 11/20/22 1812)   ketorolac (TORADOL) injection 15 mg (15 mg IntraVENous Given 11/20/22 1738)         Medical Decision Making:    Patient was given a 30 cc/kg bolus. He was given cefepime 2 g IV dose of Toradol  and acetaminophen. Patient improved clinically patient's blood pressure was better after fluid bolus. His heart rate decreased. Patient was discussed with [address] [place], [place] [address]ist and admitted to the hospital.    Counseling: The emergency provider has spoken with the patient and spouse/SO and discussed todays results, in addition to providing specific details for the plan of care and counseling regarding the diagnosis and prognosis. Questions are answered at this time and they are agreeable with the plan.      --------------------------------- IMPRESSION AND DISPOSITION ---------------------------------    IMPRESSION  1. Sepsis without acute organ dysfunction, due to unspecified organism ([facility] [address]    2.  Pleural effusion        DISPOSITION  Disposition: Admit to telemetry  Patient condition is serious                  Maximiliano Stock MD  11/20/22 1919

## 2022-11-21 PROBLEM — E86.0 DEHYDRATION: Status: ACTIVE | Noted: 2022-11-21

## 2022-11-21 PROBLEM — J10.1 INFLUENZA A: Status: ACTIVE | Noted: 2022-11-21

## 2022-11-21 PROBLEM — N39.0 COMPLICATED UTI (URINARY TRACT INFECTION): Status: ACTIVE | Noted: 2022-11-21

## 2022-11-21 LAB
ACINETOBACTER CALCOAC BAUMANNII COMPLEX BY PCR: NOT DETECTED
ADENOVIRUS BY PCR: NOT DETECTED
ALBUMIN SERPL-MCNC: 2.5 G/DL (ref 3.5–5.2)
ALP BLD-CCNC: 82 U/L (ref 40–129)
ALT SERPL-CCNC: 80 U/L (ref 0–40)
ANION GAP SERPL CALCULATED.3IONS-SCNC: 11 MMOL/L (ref 7–16)
AST SERPL-CCNC: 96 U/L (ref 0–39)
BACTEROIDES FRAGILIS BY PCR: NOT DETECTED
BILIRUB SERPL-MCNC: 0.4 MG/DL (ref 0–1.2)
BLOOD CULTURE, ROUTINE: NORMAL
BLOOD CULTURE, ROUTINE: NORMAL
BORDETELLA PARAPERTUSSIS BY PCR: NOT DETECTED
BORDETELLA PERTUSSIS BY PCR: NOT DETECTED
BOTTLE TYPE: ABNORMAL
BUN BLDV-MCNC: 25 MG/DL (ref 6–20)
CALCIUM SERPL-MCNC: 8.1 MG/DL (ref 8.6–10.2)
CANDIDA ALBICANS BY PCR: NOT DETECTED
CANDIDA AURIS BY PCR: NOT DETECTED
CANDIDA GLABRATA BY PCR: NOT DETECTED
CANDIDA KRUSEI BY PCR: NOT DETECTED
CANDIDA PARAPSILOSIS BY PCR: NOT DETECTED
CANDIDA TROPICALIS BY PCR: NOT DETECTED
CARBAPENEM RESISTANCE IMP GENE BY PCR: NOT DETECTED
CARBAPENEM RESISTANCE KPC BY PCR: NOT DETECTED
CARBAPENEM RESISTANCE NDM GENE BY PCR: NOT DETECTED
CARBAPENEM RESISTANCE OXA-48 GENE BY PCR: NOT DETECTED
CARBAPENEM RESISTANCE VIM GENE BY PCR: NOT DETECTED
CEPHALOSPORIN RESISTANCE CTX-M GENE BY PCR: DETECTED
CHLAMYDOPHILIA PNEUMONIAE BY PCR: NOT DETECTED
CHLORIDE BLD-SCNC: 98 MMOL/L (ref 98–107)
CO2: 26 MMOL/L (ref 22–29)
COLISTIN RESISTANCE MCR-1 GENE BY PCR: NOT DETECTED
CORONAVIRUS 229E BY PCR: NOT DETECTED
CORONAVIRUS HKU1 BY PCR: NOT DETECTED
CORONAVIRUS NL63 BY PCR: NOT DETECTED
CORONAVIRUS OC43 BY PCR: NOT DETECTED
CREAT SERPL-MCNC: 1.4 MG/DL (ref 0.7–1.2)
CRYPTOCOCCUS NEOFORMANS/GATTII BY PCR: NOT DETECTED
ENTEROBACTER CLOACAE COMPLEX BY PCR: NOT DETECTED
ENTEROBACTERALES BY PCR: DETECTED
ENTEROCOCCUS FAECALIS BY PCR: NOT DETECTED
ENTEROCOCCUS FAECIUM BY PCR: NOT DETECTED
ESCHERICHIA COLI BY PCR: DETECTED
GFR SERPL CREATININE-BSD FRML MDRD: 58 ML/MIN/1.73
GLUCOSE BLD-MCNC: 114 MG/DL (ref 74–99)
HAEMOPHILUS INFLUENZAE BY PCR: NOT DETECTED
HUMAN METAPNEUMOVIRUS BY PCR: NOT DETECTED
HUMAN RHINOVIRUS/ENTEROVIRUS BY PCR: NOT DETECTED
INFLUENZA A H3 BY PCR: DETECTED
INFLUENZA B BY PCR: NOT DETECTED
KLEBSIELLA AEROGENES BY PCR: NOT DETECTED
KLEBSIELLA OXYTOCA BY PCR: NOT DETECTED
KLEBSIELLA PNEUMONIAE GROUP BY PCR: NOT DETECTED
LACTIC ACID: 2.1 MMOL/L (ref 0.5–2.2)
LISTERIA MONOCYTOGENES BY PCR: NOT DETECTED
MAGNESIUM: 1.9 MG/DL (ref 1.6–2.6)
MYCOPLASMA PNEUMONIAE BY PCR: NOT DETECTED
NEISSERIA MENINGITIDIS BY PCR: NOT DETECTED
ORDER NUMBER: ABNORMAL
PARAINFLUENZA VIRUS 1 BY PCR: NOT DETECTED
PARAINFLUENZA VIRUS 2 BY PCR: NOT DETECTED
PARAINFLUENZA VIRUS 3 BY PCR: NOT DETECTED
PARAINFLUENZA VIRUS 4 BY PCR: NOT DETECTED
POTASSIUM REFLEX MAGNESIUM: 3.4 MMOL/L (ref 3.5–5)
PROCALCITONIN: 87.25 NG/ML (ref 0–0.08)
PROTEUS SPECIES BY PCR: NOT DETECTED
PSEUDOMONAS AERUGINOSA BY PCR: NOT DETECTED
RESPIRATORY SYNCYTIAL VIRUS BY PCR: NOT DETECTED
SALMONELLA SPECIES BY PCR: NOT DETECTED
SARS-COV-2, PCR: NOT DETECTED
SERRATIA MARCESCENS BY PCR: NOT DETECTED
SODIUM BLD-SCNC: 135 MMOL/L (ref 132–146)
SOURCE OF BLOOD CULTURE: ABNORMAL
STAPHYLOCOCCUS AUREUS BY PCR: NOT DETECTED
STAPHYLOCOCCUS EPIDERMIDIS BY PCR: NOT DETECTED
STAPHYLOCOCCUS LUGDUNENSIS BY PCR: NOT DETECTED
STAPHYLOCOCCUS SPECIES BY PCR: NOT DETECTED
STENOTROPHOMONAS MALTOPHILIA BY PCR: NOT DETECTED
STREPTOCOCCUS AGALACTIAE BY PCR: NOT DETECTED
STREPTOCOCCUS PNEUMONIAE BY PCR: NOT DETECTED
STREPTOCOCCUS PYOGENES  BY PCR: NOT DETECTED
STREPTOCOCCUS SPECIES BY PCR: NOT DETECTED
TOTAL PROTEIN: 5.4 G/DL (ref 6.4–8.3)

## 2022-11-21 PROCEDURE — 2580000003 HC RX 258: Performed by: SPECIALIST

## 2022-11-21 PROCEDURE — 83735 ASSAY OF MAGNESIUM: CPT

## 2022-11-21 PROCEDURE — 0202U NFCT DS 22 TRGT SARS-COV-2: CPT

## 2022-11-21 PROCEDURE — 36415 COLL VENOUS BLD VENIPUNCTURE: CPT

## 2022-11-21 PROCEDURE — 94640 AIRWAY INHALATION TREATMENT: CPT

## 2022-11-21 PROCEDURE — 6370000000 HC RX 637 (ALT 250 FOR IP): Performed by: STUDENT IN AN ORGANIZED HEALTH CARE EDUCATION/TRAINING PROGRAM

## 2022-11-21 PROCEDURE — 6360000002 HC RX W HCPCS: Performed by: SPECIALIST

## 2022-11-21 PROCEDURE — 99233 SBSQ HOSP IP/OBS HIGH 50: CPT | Performed by: INTERNAL MEDICINE

## 2022-11-21 PROCEDURE — 2060000000 HC ICU INTERMEDIATE R&B

## 2022-11-21 PROCEDURE — 6370000000 HC RX 637 (ALT 250 FOR IP): Performed by: SPECIALIST

## 2022-11-21 PROCEDURE — 6370000000 HC RX 637 (ALT 250 FOR IP): Performed by: INTERNAL MEDICINE

## 2022-11-21 PROCEDURE — 2580000003 HC RX 258: Performed by: STUDENT IN AN ORGANIZED HEALTH CARE EDUCATION/TRAINING PROGRAM

## 2022-11-21 PROCEDURE — 80053 COMPREHEN METABOLIC PANEL: CPT

## 2022-11-21 PROCEDURE — 6370000000 HC RX 637 (ALT 250 FOR IP)

## 2022-11-21 PROCEDURE — 6360000002 HC RX W HCPCS: Performed by: STUDENT IN AN ORGANIZED HEALTH CARE EDUCATION/TRAINING PROGRAM

## 2022-11-21 PROCEDURE — 87449 NOS EACH ORGANISM AG IA: CPT

## 2022-11-21 PROCEDURE — 84145 PROCALCITONIN (PCT): CPT

## 2022-11-21 PROCEDURE — 83605 ASSAY OF LACTIC ACID: CPT

## 2022-11-21 RX ORDER — HYDROCODONE BITARTRATE AND HOMATROPINE METHYLBROMIDE ORAL SOLUTION 5; 1.5 MG/5ML; MG/5ML
5 LIQUID ORAL EVERY 4 HOURS PRN
Status: DISPENSED | OUTPATIENT
Start: 2022-11-21 | End: 2022-11-23

## 2022-11-21 RX ORDER — SODIUM CHLORIDE 9 MG/ML
INJECTION, SOLUTION INTRAVENOUS CONTINUOUS
Status: DISCONTINUED | OUTPATIENT
Start: 2022-11-21 | End: 2022-11-23 | Stop reason: HOSPADM

## 2022-11-21 RX ORDER — OSELTAMIVIR PHOSPHATE 75 MG/1
75 CAPSULE ORAL 2 TIMES DAILY
Status: DISCONTINUED | OUTPATIENT
Start: 2022-11-21 | End: 2022-11-23 | Stop reason: HOSPADM

## 2022-11-21 RX ORDER — BENZONATATE 100 MG/1
100 CAPSULE ORAL 3 TIMES DAILY PRN
Status: DISCONTINUED | OUTPATIENT
Start: 2022-11-21 | End: 2022-11-23 | Stop reason: HOSPADM

## 2022-11-21 RX ADMIN — HYDROCODONE BITARTRATE AND HOMATROPINE METHYLBROMIDE 5 ML: 5; 1.5 SOLUTION ORAL at 17:07

## 2022-11-21 RX ADMIN — OSELTAMIVIR PHOSPHATE 75 MG: 75 CAPSULE ORAL at 13:36

## 2022-11-21 RX ADMIN — ENOXAPARIN SODIUM 30 MG: 100 INJECTION SUBCUTANEOUS at 21:13

## 2022-11-21 RX ADMIN — ONDANSETRON 4 MG: 2 INJECTION INTRAMUSCULAR; INTRAVENOUS at 17:07

## 2022-11-21 RX ADMIN — IPRATROPIUM BROMIDE AND ALBUTEROL SULFATE 1 AMPULE: 2.5; .5 SOLUTION RESPIRATORY (INHALATION) at 21:03

## 2022-11-21 RX ADMIN — Medication 1 CAPSULE: at 09:56

## 2022-11-21 RX ADMIN — IPRATROPIUM BROMIDE AND ALBUTEROL SULFATE 1 AMPULE: 2.5; .5 SOLUTION RESPIRATORY (INHALATION) at 09:29

## 2022-11-21 RX ADMIN — OSELTAMIVIR PHOSPHATE 75 MG: 75 CAPSULE ORAL at 21:12

## 2022-11-21 RX ADMIN — ACETAMINOPHEN 650 MG: 325 TABLET ORAL at 03:00

## 2022-11-21 RX ADMIN — CEFEPIME 2000 MG: 2 INJECTION, POWDER, FOR SOLUTION INTRAVENOUS at 06:42

## 2022-11-21 RX ADMIN — SODIUM CHLORIDE: 9 INJECTION, SOLUTION INTRAVENOUS at 10:38

## 2022-11-21 RX ADMIN — ONDANSETRON 4 MG: 2 INJECTION INTRAMUSCULAR; INTRAVENOUS at 03:18

## 2022-11-21 RX ADMIN — IPRATROPIUM BROMIDE AND ALBUTEROL SULFATE 1 AMPULE: 2.5; .5 SOLUTION RESPIRATORY (INHALATION) at 13:05

## 2022-11-21 RX ADMIN — HYDROCODONE BITARTRATE AND HOMATROPINE METHYLBROMIDE 5 ML: 5; 1.5 SOLUTION ORAL at 21:12

## 2022-11-21 RX ADMIN — MEROPENEM 1000 MG: 1 INJECTION, POWDER, FOR SOLUTION INTRAVENOUS at 13:41

## 2022-11-21 RX ADMIN — AZITHROMYCIN 500 MG: 250 TABLET, FILM COATED ORAL at 09:56

## 2022-11-21 RX ADMIN — MEROPENEM 1000 MG: 1 INJECTION, POWDER, FOR SOLUTION INTRAVENOUS at 21:17

## 2022-11-21 RX ADMIN — TAMSULOSIN HYDROCHLORIDE 0.4 MG: 0.4 CAPSULE ORAL at 09:56

## 2022-11-21 RX ADMIN — ENOXAPARIN SODIUM 30 MG: 100 INJECTION SUBCUTANEOUS at 09:56

## 2022-11-21 RX ADMIN — BENZONATATE 100 MG: 100 CAPSULE ORAL at 01:37

## 2022-11-21 RX ADMIN — CLONAZEPAM 0.5 MG: 0.5 TABLET ORAL at 03:00

## 2022-11-21 ASSESSMENT — PAIN SCALES - GENERAL
PAINLEVEL_OUTOF10: 0

## 2022-11-21 ASSESSMENT — ENCOUNTER SYMPTOMS
EYES NEGATIVE: 1
NAUSEA: 0
ABDOMINAL PAIN: 0
COLOR CHANGE: 0
COUGH: 1
VOMITING: 0

## 2022-11-21 NOTE — PROGRESS NOTES
Physical Therapy  PT jaziel attempted. Pt reports he is walking independently in the room. Is currently in droplet isolation. Will discharge order. Pt instructed to let staff know if PT needs arise.

## 2022-11-21 NOTE — PROGRESS NOTES
Patient adamant about speaking with a doctor. Attending house physician called and will come talk to him. Wife updated on the phone.     Electronically signed by Indu Baltazar RN on 11/21/2022 at 2:46 AM

## 2022-11-21 NOTE — CONSULTS
11/21/22  1:46 PM  Service: Urology  Group: [facility] (Rashawn/Adan/V[facility]ctor)    Cindy Host  [phone]     Chief Complaint/reason for consultation     Left hydronephrosis    History of Present Illness: The patient is a 61 y.o. male patient who was just discharged on 11/19/22. He was seen by [facility] urology for sepsis. He underwent a stone procedure at [facility]   11/10/22. He was having fever, chills malaise, and feeling very sick. He improved and went home 11/19 after an ICU stay. He returned 11/20 after vomiting and fever. He tested positive for influenza A. His wife is present. He is currently tolerating some fluids. Past Medical History:   Diagnosis Date    Anxiety     Depression     Diabetes mellitus ([address]     Fibromyalgia     Left retinal detachment     Metabolic syndrome     Obesity          Past Surgical History:   Procedure Laterality Date    CHOLECYSTECTOMY      RETINAL DETACHMENT SURGERY Left 8/28/2020    LEFT EYE PARS PLANA VITRECTOMY 25 GAUGE RETINAL DETACHMENT REPAIR LASER AND GAS FLUID EXCHANGE (SF6) performed by Reggie Nuñez MD at [address] [place] [address]  2015    VASECTOMY  2004       Medications Prior to Admission:    Medications Prior to Admission: cefdinir (OMNICEF) 300 MG capsule, Take 1 capsule by mouth every 12 hours for 10 days  levoFLOXacin (LEVAQUIN) 500 MG tablet, Take 1 tablet by mouth daily for 10 days  lactobacillus (CULTURELLE) CAPS capsule, Take 1 capsule by mouth daily  oxyCODONE 5 MG capsule, Take 1 capsule by mouth every 6 hours as needed for Pain for up to 3 days. amphetamine-dextroamphetamine (ADDERALL) 20 MG tablet, Take 20 mg by mouth 3 times daily as needed (TO IMPROVE FOCUS).   ondansetron (ZOFRAN-ODT) 4 MG disintegrating tablet, Take 4 mg by mouth every 8 hours as needed for Nausea or Vomiting  oxybutynin (DITROPAN-XL) 10 MG extended release tablet, Take 10 mg by mouth daily as needed (BLADDER SPASMS)  Semaglutide, 1 MG/DOSE, (OZEMPIC, 1 MG/DOSE,) 4 MG/3ML SOPN, Inject 1 mg into the skin once a week **SATURDAYS**  tamsulosin (FLOMAX) 0.4 MG capsule, Take 0.4 mg by mouth every morning  clonazePAM (KLONOPIN) 0.5 MG tablet, Take 0.5 mg by mouth 2 times daily as needed for Anxiety. famotidine (PEPCID) 40 MG tablet, Take 40 mg by mouth daily as needed (HEARTBURN)  DULoxetine (CYMBALTA) 60 MG extended release capsule, Take 60 mg by mouth nightly  sildenafil (VIAGRA) 100 MG tablet, Take 100 mg by mouth as needed for Erectile Dysfunction  QUEtiapine (SEROQUEL) 200 MG tablet, Take 200 mg by mouth nightly    Allergies:    Statins    Social History:    reports that he has never smoked. He has never used smokeless tobacco. He reports that he does not drink alcohol and does not use drugs. Family History:   Non-contributory to this Urological problem  Family history is unknown by patient. Review of Systems:  Constitutional: fatigue, weakness, fever  Respiratory: positive for cough   Cardiovascular: negative for chest pain and dyspnea  Gastrointestinal:Positive for left abdominal pain, no diarrhea, +nausea and vomiting   Derm: negative for rash and skin lesion(s)  Neurological: negative for seizures and tremors  Musculoskeletal: weakness  Endocrine: negative for diabetic symptoms including polydipsia and polyuria  Psychiatric: negative  : As above in the HPI, otherwise negative  All other reviews are negative    Physical Exam:     Vitals:  /71   Pulse 86   Temp 99.8 °F (37.7 °C) (Oral)   Resp 18   Ht 6' 3\" (1.905 m)   Wt 226 lb 3.2 oz (102.6 kg)   SpO2 95%   BMI 28.27 kg/m²     General:  Awake, alert, oriented X 3. Well developed, well nourished, well groomed. Appears ill. HEENT:  Normocephalic, atraumatic. Pupils equal, round. No scleral icterus. No conjunctival injection. Normal lips, teeth, and gums. No nasal discharge. Neck:  Supple, no masses. Heart:  Regular rate  Lungs:  No audible wheezing.   Respirations symmetric and non-labored. Abdomen:  soft, nontender, no masses, no organomegaly, no peritoneal signs  Extremities:  No clubbing, cyanosis, or edema  Skin:  Warm and dry, no open lesions or rashes  Neuro:  There are no motor or sensory deficits in the 4 quadrant extremities   Rectal: deferred  Genitalia: deferred    Labs:     Recent Labs     11/19/22  0513 11/20/22  1716   WBC 12.6* 12.0*   RBC 3.46* 3.75*   HGB 10.5* 11.1*   HCT 31.5* 33.5*   MCV 91.0 89.3   MCH 30.3 29.6   MCHC 33.3 33.1   RDW 14.3 14.2    338   MPV 9.6 9.1         Recent Labs     11/19/22  0513 11/20/22  1716 11/21/22  1050   CREATININE 1.1 1.4* 1.4*     11/20/22 1902     Culture, Blood 2  Abnormal   Gram stain performed from blood culture bottle media   Gram negative rods            11/21/22 0743     Adenovirus by PCR Not Detected Not Detected    Bordetella parapertussis by PCR Not Detected Not Detected    Bordetella pertussis by PCR Not Detected Not Detected    Chlamydophilia pneumoniae by PCR Not Detected Not Detected    Coronavirus 229E by PCR Not Detected Not Detected    Coronavirus HKU1 by PCR Not Detected Not Detected    Coronavirus NL63 by PCR Not Detected Not Detected    Coronavirus OC43 by PCR Not Detected Not Detected    SARS-CoV-2, PCR Not Detected Not Detected    Human Metapneumovirus by PCR Not Detected Not Detected    Human Rhinovirus/Enterovirus by PCR Not Detected Not Detected    Influenza A H3 by PCR Not Detected DETECTED Abnormal     Influenza B by PCR Not Detected Not Detected    Mycoplasma pneumoniae by PCR Not Detected Not Detected    Parainfluenza Virus 1 by PCR Not Detected Not Detected    Parainfluenza Virus 2 by PCR Not Detected Not Detected    Parainfluenza Virus 3 by PCR Not Detected Not Detected    Parainfluenza Virus 4 by PCR Not Detected Not Detected    Respiratory Syncytial Virus by PCR Not Detected Not Detected           Latest Reference Range & Units 11/20/22 17:47   Color, UA Straw/Yellow  Yellow Clarity, UA Clear  Clear   Glucose, UA Negative mg/dL Negative   Bilirubin, Urine Negative  Negative   Ketones, Urine Negative mg/dL Negative   Specific Gravity, UA 1.005 - 1.030  1.025   Blood, Urine Negative  TRACE-INTACT   pH, UA 5.0 - 9.0  6.0   Protein, UA Negative mg/dL TRACE   Urobilinogen, Urine <2.0 E.U./dL 0.2   Nitrite, Urine Negative  Negative   Leukocyte Esterase, Urine Negative  TRACE !   WBC, UA 0 - 5 /HPF 5-10 ! RBC, UA 0 - 2 /HPF 1-3   Bacteria, UA None Seen /HPF MODERATE ! EXAMINATION:   CT OF THE ABDOMEN AND PELVIS WITHOUT CONTRAST 11/20/2022 6:38 pm       TECHNIQUE:   CT of the abdomen and pelvis was performed without the administration of   intravenous contrast. Multiplanar reformatted images are provided for review. Automated exposure control, iterative reconstruction, and/or weight based   adjustment of the mA/kV was utilized to reduce the radiation dose to as low   as reasonably achievable. COMPARISON:   November 16, 2022       HISTORY:   ORDERING SYSTEM PROVIDED HISTORY: pyelonephritis acutely septic   TECHNOLOGIST PROVIDED HISTORY:   Reason for exam:->pyelonephritis acutely septic   Additional Contrast?->None   Decision Support Exception - unselect if not a suspected or confirmed   emergency medical condition->Emergency Medical Condition (MA)       FINDINGS:   Redemonstration of left ureteral stent appearing in stable, satisfactory   position. There is stable mild left hydronephrosis and mild left perinephric   edema. No evidence of calculus along course of the left ureteral stent. Redemonstration of a hypoattenuating lesion associated with the left kidney   measuring approximately 2.5 cm appearing similar in size. Nonobstructing   calculus associated with the right kidney measures approximately 3 mm and   appears in stable position. Hypoattenuating lesion likely representing a   cyst associated with the right kidney measures 3.8 cm.   Hyperattenuating   lesion associated with right kidney measures approximately 1.4 x 1.5 cm which   may represent a stable hyperdense cyst.  Urinary bladder is not optimally   distended yet appears normal in contour. Prostate gland is enlarged   measuring 4.8 x 6.6 cm. Numerous diverticula are seen involving the sigmoid   colon. No evidence of acute diverticulitis. The appendix is not   definitively visualized, however no focal inflammatory changes in its   expected location. No intrahepatic or extrahepatic bile duct dilatation. Evidence of cholecystectomy. Partial fatty replacement of the pancreas. No   evidence of acute pancreatitis. Spleen is unremarkable. Adrenal glands are   unremarkable. New small bilateral pleural effusions. New opacities are in   posterior segments of right and left lower lobes. Impression   1. Stable, satisfactory position of left ureteral stent. 2. Redemonstration of mild left hydronephrosis and left perinephric edema. 3. Nonobstructing right renal calculus. 4. Diverticulosis. 5. New bilateral pleural effusions with new opacities in posterior lower   lobes related to atelectasis or pneumonia. 6. Hypoattenuating lesions associated with both kidneys likely represent   cysts. Ultrasound recommended for further evaluation. Assessment/plan:  POD# 11-S/P Cysto/Left ureteroscopy/Left stent insertion (Dr. Humberto Mahoney)  -CT scan on 11/16/2022 showing a left well-placed ureteral stent without evidence of residual stone debris within the urinary tract aside from a punctate right lower pole renal calculus. There are bilateral simple renal cysts  -Urine cultures pending.   Continue antibiotics per infectious disease recommendations   -Tiny right renal stone is of no clinical concern at this time and will be monitored and managed nonoperatively  -Cystoscopy and stent removal to be performed in the outpatient setting in our office after resolution of his acute infection    Sepsis from a urinary source  Continue left stent  Antibiotics per ID   Influenza A   Updated pt and wife    Kisha Alas NP-C  [facility]     Electronically signed by ADALBERTO Hinojosa CNP on 11/21/2022 at 1:46 PM     The patient was seen and examined today by me. I have reviewed the entire note and agree with the assessment and plan of ADALBERTO Hinojosa CNP.

## 2022-11-21 NOTE — PLAN OF CARE
Problem: Pain  Goal: Verbalizes/displays adequate comfort level or baseline comfort level  11/21/2022 1213 by Erlin Ribera RN  Outcome: Progressing  11/20/2022 2309 by Sarbjit Almanzar RN  Outcome: Progressing     Problem: Safety - Adult  Goal: Free from fall injury  11/21/2022 1213 by Erlin Ribera RN  Outcome: Progressing  Flowsheets (Taken 11/21/2022 1015)  Free From Fall Injury: Instruct family/caregiver on patient safety  11/20/2022 2309 by Sarbjit Almanzar RN  Outcome: Progressing     Problem: Chronic Conditions and Co-morbidities  Goal: Patient's chronic conditions and co-morbidity symptoms are monitored and maintained or improved  Outcome: Progressing

## 2022-11-21 NOTE — CONSULTS
[address] [place] [address] [facility]  Consultation Note     Admit Date: 11/20/2022  4:45 PM    Reason for Consult:   Sepsis    Attending Physician:  Benita Bush DO    HISTORY OF PRESENT ILLNESS:             The history is obtained from extensive review of available past medical records. The patient is a 61 y.o. male who is previously known to the ID service. The patient was being treated in [facility] for kidney stones and had a ureteral stent placed there. He received IV antibiotics during that admission but was not discharged on any antibiotics. Previous urine cultures were negative the patient was admitted to [facility] on 11/16/2022 with fever, flank pain and dysuria. He was admitted to the ICU and was treated with Cefepime and placed on vasopressors. He made improvement. There was some problem with the urine being ordered and actually not being processed. We will try to find a urine specimen and asked microbiology to process it. After couple days he improved and since there were no urine cultures and blood cultures were negative, we decided to discharge him home on Cefdinir and Levofloxacin on 11/20/2022. The patient came back to [facility], ED on 11/21/2022 with fever, cough, malaise, uncontrollable shaking chills. Blood cultures were ordered. A urine culture, unfortunately, was never ordered. He was treated with Cefepime. The urine specimen was from 11/16/2022 was actually processed and showed <25,000 ESBL + E. coli and <10,000 CFU streptococcal species. He tested positive for influenza A. He said he had was vaccinated in [facility] a few weeks ago. Blood cultures have turned positive for ESBL + E. coli. I ordered meropenem and Oseltamivir.     Past Medical History:        Diagnosis Date    Anxiety     Depression     Diabetes mellitus ([facility] [address]     Fibromyalgia     Left retinal detachment     Metabolic syndrome     Obesity      Past Surgical History:        Procedure Laterality Date    CHOLECYSTECTOMY      RETINAL DETACHMENT SURGERY Left 8/28/2020    LEFT EYE PARS PLANA VITRECTOMY 25 GAUGE RETINAL DETACHMENT REPAIR LASER AND GAS FLUID EXCHANGE [facility]) performed by Gibson Dee MD at [address] [facility]  2015    VASECTOMY  2004     Current Medications:   Scheduled Meds:   oseltamivir  75 mg Oral BID    meropenem  1,000 mg IntraVENous Q8H    DULoxetine  60 mg Oral Nightly    lactobacillus  1 capsule Oral Daily    QUEtiapine  200 mg Oral Nightly    [START ON 11/26/2022] Semaglutide (1 MG/DOSE)  1 mg SubCUTAneous Weekly    tamsulosin  0.4 mg Oral QAM    sodium chloride flush  5-40 mL IntraVENous 2 times per day    enoxaparin  30 mg SubCUTAneous BID    ipratropium-albuterol  1 ampule Inhalation Q4H WA     Continuous Infusions:   sodium chloride      sodium chloride 75 mL/hr at 11/21/22 1038     PRN Meds:benzonatate, amphetamine-dextroamphetamine, clonazePAM, famotidine, oxybutynin, oxyCODONE, sodium chloride flush, sodium chloride, ondansetron **OR** ondansetron, polyethylene glycol, acetaminophen **OR** acetaminophen    Allergies:  Statins    Social History:   Social History     Socioeconomic History    Marital status:    Tobacco Use    Smoking status: Never    Smokeless tobacco: Never   Vaping Use    Vaping Use: Never used   Substance and Sexual Activity    Alcohol use: No    Drug use: No      Pets: Cats  Travel: No  The patient lives at home with his wife. He retired from doing tax audits    Family History:       Family history unknown: Yes   . Otherwise non-pertinent to the chief complaint. REVIEW OF SYSTEMS:    Constitutional: Positive for fevers, chills, diaphoresis and malaise  Neurologic: Negative   Psychiatric: Negative  Rheumatologic: Negative   Endocrine: Negative  Hematologic: Negative  Immunologic: Negative  ENT: Negative  Respiratory: Dry cough  Cardiovascular: Negative  GI: Negative  : As in the HPI. She still has a left ureteral stent. He was having left flank during his previous admission but this seems to have improved  Musculoskeletal: Negative  Skin: No rashes. PHYSICAL EXAM:    Vitals:   /71   Pulse 86   Temp 99.8 °F (37.7 °C) (Oral)   Resp 18   Ht 6' 3\" (1.905 m)   Wt 226 lb 3.2 oz (102.6 kg)   SpO2 95%   BMI 28.27 kg/m²   Constitutional: The patient is awake, alert, and oriented. Skin: Warm and dry. No rashes were noted. HEENT: Eyes show round, and reactive pupils. No jaundice. Moist mucous membranes, no ulcerations, no thrush. Neck: Supple to movements. No lymphadenopathy. Chest: No use of accessory muscles to breathe. Symmetrical expansion. Auscultation reveals no wheezing, crackles, or rhonchi. Cardiovascular: S1 and S2 are rhythmic and regular. No murmurs appreciated. Abdomen: Positive bowel sounds to auscultation. Benign to palpation. No masses felt. No hepatosplenomegaly. Extremities: No clubbing, no cyanosis, no edema.   Lines: Peripheral.      CBC+dif:  Recent Labs     11/19/22  0513 11/20/22  1716   WBC 12.6* 12.0*   HGB 10.5* 11.1*   HCT 31.5* 33.5*   MCV 91.0 89.3    338   NEUTROABS 9.62* 11.04*     Lab Results   Component Value Date    CRP 13.4 (H) 11/19/2022    CRP 13.4 (H) 11/18/2022    CRP 17.9 (H) 11/17/2022      No results found for: CRPHS  Lab Results   Component Value Date    SEDRATE 66 (H) 11/19/2022    SEDRATE 63 (H) 11/18/2022    SEDRATE 70 (H) 11/17/2022     Lab Results   Component Value Date    ALT 80 (H) 11/21/2022    AST 96 (H) 11/21/2022    ALKPHOS 82 11/21/2022    BILITOT 0.4 11/21/2022     Lab Results   Component Value Date/Time     11/21/2022 10:50 AM    K 3.4 11/21/2022 10:50 AM    CL 98 11/21/2022 10:50 AM    CO2 26 11/21/2022 10:50 AM    BUN 25 11/21/2022 10:50 AM    CREATININE 1.4 11/21/2022 10:50 AM    GFRAA >60 06/13/2021 03:00 AM    LABGLOM 58 11/21/2022 10:50 AM    GLUCOSE 114 11/21/2022 10:50 AM    PROT 5.4 11/21/2022 10:50 AM    LABALBU 2.5 11/21/2022 10:50 AM CALCIUM 8.1 11/21/2022 10:50 AM    BILITOT 0.4 11/21/2022 10:50 AM    ALKPHOS 82 11/21/2022 10:50 AM    AST 96 11/21/2022 10:50 AM    ALT 80 11/21/2022 10:50 AM       No results found for: PROTIME, INR    Lab Results   Component Value Date/Time    TSH 2.420 11/16/2022 11:54 AM       Lab Results   Component Value Date/Time    COLORU Yellow 11/20/2022 05:47 PM    PHUR 6.0 11/20/2022 05:47 PM    WBCUA 5-10 11/20/2022 05:47 PM    RBCUA 1-3 11/20/2022 05:47 PM    MUCUS Present 11/16/2022 05:00 PM    BACTERIA MODERATE 11/20/2022 05:47 PM    CLARITYU Clear 11/20/2022 05:47 PM    SPECGRAV 1.025 11/20/2022 05:47 PM    LEUKOCYTESUR TRACE 11/20/2022 05:47 PM    UROBILINOGEN 0.2 11/20/2022 05:47 PM    BILIRUBINUR Negative 11/20/2022 05:47 PM    BLOODU TRACE-INTACT 11/20/2022 05:47 PM    GLUCOSEU Negative 11/20/2022 05:47 PM       No results found for: HCO3, BE, O2SAT, PH, THGB, PCO2, PO2, TCO2  Radiology:  Reviewed    Microbiology:  Pending  No results for input(s): BC in the last 72 hours. No results for input(s): ORG in the last 72 hours. Recent Labs     11/20/22  1902   BLOODCULT2 Gram stain performed from blood culture bottle media  Gram negative rods  *     No results for input(s): STREPNEUMAGU in the last 72 hours. No results for input(s): LP1UAG in the last 72 hours. No results for input(s): ASO in the last 72 hours. No results for input(s): CULTRESP in the last 72 hours. Recent Labs     11/21/22  1050   PROCAL 87.25*       Assessment:  Complicated UTI with sepsis. Urine cultures from previous admission were ordered but not done until later when we asked microbiology to search with the specimen.   These grew ESBL + E. coli and Streptococcus species  Status post cystoscopy with left ureteral stent 11/11/2022 in [place]  ESBL + E. coli septicemia associated to the above  Influenza A infection  Leukocytosis associated to the above  Elevation of procalcitonin secondary to sepsis    Plan:    Continue Meropenem and oral Oseltamivir  Check cultures, baseline ESR, CRP  Repeat blood cultures tomorrow  Will follow with you    Thank you for having us see this patient in consultation. I will be discussing this case with the treating physicians. Spoke with nursing.     Eliezer Sheriff MD  3:14 PM  11/21/2022

## 2022-11-21 NOTE — H&P
[facility] History and Physical      CHIEF COMPLAINT: Nausea, vomiting, flank pain    History of Present Illness: 54-year-old male presents emergency department for nausea, vomiting, and left sided flank pain started this morning. Patient was recently discharged for sepsis secondary to UTI requiring ICU admission at that time. Patient states that he has vomited a few times and has had fevers as well as fast heart rate. Patient was discharged on outpatient antibiotics. He states that he is compliant with all his medications. Patient denies chest pain, abdominal pain, numbness, tingling, or headache. Informant(s) for H&P: Chart review and patient    REVIEW OF SYSTEMS:  A comprehensive review of systems was negative except for: what is in the HPI      PMH:  Past Medical History:   Diagnosis Date    Anxiety     Depression     Diabetes mellitus ([address]     Fibromyalgia     Left retinal detachment     Metabolic syndrome     Obesity        Surgical History:  Past Surgical History:   Procedure Laterality Date    CHOLECYSTECTOMY      RETINAL DETACHMENT SURGERY Left 8/28/2020    LEFT EYE PARS PLANA VITRECTOMY 25 GAUGE RETINAL DETACHMENT REPAIR LASER AND GAS FLUID EXCHANGE [facility]) performed by Lance Matson MD at [address] [place] [address]  2015    VASECTOMY  2004       Medications Prior to Admission:    Prior to Admission medications    Medication Sig Start Date End Date Taking? Authorizing Provider   cefdinir (OMNICEF) 300 MG capsule Take 1 capsule by mouth every 12 hours for 10 days 11/19/22 11/29/22 Yes Fern Virk MD   levoFLOXacin (LEVAQUIN) 500 MG tablet Take 1 tablet by mouth daily for 10 days 11/19/22 11/29/22 Yes Fern Virk MD   lactobacillus (CULTURELLE) CAPS capsule Take 1 capsule by mouth daily 11/20/22  Yes Jesusita Peraza MD   oxyCODONE 5 MG capsule Take 1 capsule by mouth every 6 hours as needed for Pain for up to 3 days.  11/19/22 11/22/22 Yes Octavio Corral MD [place]   amphetamine-dextroamphetamine (ADDERALL) 20 MG tablet Take 20 mg by mouth 3 times daily as needed (TO IMPROVE FOCUS). Yes Historical Provider, MD   ondansetron (ZOFRAN-ODT) 4 MG disintegrating tablet Take 4 mg by mouth every 8 hours as needed for Nausea or Vomiting   Yes Historical Provider, MD   oxybutynin (DITROPAN-XL) 10 MG extended release tablet Take 10 mg by mouth daily as needed (BLADDER SPASMS)   Yes Historical Provider, MD   Semaglutide, 1 MG/DOSE, (OZEMPIC, 1 MG/DOSE,) 4 MG/3ML SOPN Inject 1 mg into the skin once a week **SATURDAYS**   Yes Historical Provider, MD   tamsulosin (FLOMAX) 0.4 MG capsule Take 0.4 mg by mouth every morning   Yes Historical Provider, MD   clonazePAM (KLONOPIN) 0.5 MG tablet Take 0.5 mg by mouth 2 times daily as needed for Anxiety. Yes Historical Provider, MD   famotidine (PEPCID) 40 MG tablet Take 40 mg by mouth daily as needed (HEARTBURN)   Yes Historical Provider, MD   DULoxetine (CYMBALTA) 60 MG extended release capsule Take 60 mg by mouth nightly 12/3/20  Yes Historical Provider, MD   sildenafil (VIAGRA) 100 MG tablet Take 100 mg by mouth as needed for Erectile Dysfunction    Historical Provider, MD   QUEtiapine (SEROQUEL) 200 MG tablet Take 200 mg by mouth nightly 11/22/17   Historical Provider, MD       Allergies:    Statins    Social History:    reports that he has never smoked. He has never used smokeless tobacco. He reports that he does not drink alcohol and does not use drugs. Family History:   Family history is unknown by patient.        PHYSICAL EXAM:  Vitals:  /66   Pulse (!) 107   Temp 100.4 °F (38 °C) (Oral)   Resp 18   Ht 6' 3\" (1.905 m)   Wt 230 lb (104.3 kg)   SpO2 92%   BMI 28.75 kg/m²     General Appearance: alert and oriented to person, place and time and in no acute distress  Skin: warm and dry  Head: normocephalic and atraumatic  Eyes: pupils equal, round, and reactive to light, extraocular eye movements intact, conjunctivae normal  Neck: neck supple and non tender without mass   Pulmonary/Chest: Crackles at bases, normal air movement, no respiratory distress  Cardiovascular: normal rate, normal S1 and S2 and no carotid bruits  Abdomen: soft, non-tender, non-distended, normal bowel sounds, no masses or organomegaly  Extremities: no cyanosis, no clubbing and no edema  Neurologic: no cranial nerve deficit and speech normal        LABS:  Recent Labs     11/18/22  0545 11/19/22  0513 11/20/22  1716    135 135   K 3.9 3.8 3.6    101 97*   CO2 24 27 27   BUN 21* 14 20   CREATININE 1.3* 1.1 1.4*   GLUCOSE 93 110* 109*   CALCIUM 8.1* 8.2* 8.6       Recent Labs     11/18/22  0545 11/19/22  0513 11/20/22  1716   WBC 12.8* 12.6* 12.0*   RBC 3.45* 3.46* 3.75*   HGB 10.3* 10.5* 11.1*   HCT 31.9* 31.5* 33.5*   MCV 92.5 91.0 89.3   MCH 29.9 30.3 29.6   MCHC 32.3 33.3 33.1   RDW 14.4 14.3 14.2    264 338   MPV 9.4 9.6 9.1       No results for input(s): POCGLU in the last 72 hours. CBC:   Lab Results   Component Value Date/Time    WBC 12.0 11/20/2022 05:16 PM    RBC 3.75 11/20/2022 05:16 PM    HGB 11.1 11/20/2022 05:16 PM    HCT 33.5 11/20/2022 05:16 PM    MCV 89.3 11/20/2022 05:16 PM    MCH 29.6 11/20/2022 05:16 PM    MCHC 33.1 11/20/2022 05:16 PM    RDW 14.2 11/20/2022 05:16 PM     11/20/2022 05:16 PM    MPV 9.1 11/20/2022 05:16 PM     BMP:    Lab Results   Component Value Date/Time     11/20/2022 05:16 PM    K 3.6 11/20/2022 05:16 PM    K 3.5 06/13/2021 03:00 AM    CL 97 11/20/2022 05:16 PM    CO2 27 11/20/2022 05:16 PM    BUN 20 11/20/2022 05:16 PM    LABALBU 2.8 11/20/2022 05:16 PM    CREATININE 1.4 11/20/2022 05:16 PM    CALCIUM 8.6 11/20/2022 05:16 PM    GFRAA >60 06/13/2021 03:00 AM    LABGLOM 58 11/20/2022 05:16 PM    GLUCOSE 109 11/20/2022 05:16 PM       Radiology:   CT ABDOMEN PELVIS WO CONTRAST Additional Contrast? None   Final Result   1. Stable, satisfactory position of left ureteral stent.    2. Redemonstration of mild left hydronephrosis and left perinephric edema. 3. Nonobstructing right renal calculus. 4. Diverticulosis. 5. New bilateral pleural effusions with new opacities in posterior lower   lobes related to atelectasis or pneumonia. 6. Hypoattenuating lesions associated with both kidneys likely represent   cysts. Ultrasound recommended for further evaluation. XR CHEST PORTABLE   Final Result   1. No acute cardiopulmonary pathology seen. Stable atherosclerotic disease. 2.  Stable rib fracture on the right. Interval removal of right IJ catheter. EKG:     ASSESSMENT:      Principal Problem:    Sepsis ([address]  Resolved Problems:    * No resolved hospital problems. *      PLAN:    1.  UTI-continue azithromycin and cefepime. Urology consultation. Pending urine cultures. Monitor BMP and CBC daily. Monitor vitals every 8 hours. 2.  Left hydronephrosis-stent satisfactory in place. Urology consultation. Continue antibiotics. Monitor labs daily. 3.  Hospital-acquired pneumonia-patient recently discharged from the ICU for sepsis secondary to UTI. Imaging concerning for pneumonia. Patient is also requiring 2 L of oxygen with a baseline of room air. Continue azithromycin and cefepime. Pending procalcitonin. 4.  Sepsis-likely secondary to #1 and #3. Continue IV antibiotics. Gentle hydration. Monitor volume status secondary to pleural effusions. 5.  Bilateral pleural effusions-pulmonology consultation. Gentle hydration. Monitor volume status frequently. Monitor vitals every 8 hours. Monitor CBC and BMP daily. Consider thoracentesis if indicated by pulmonology. 6.  TOSHIA/ATN-likely secondary to recent antibiotic use versus prerenal azotemia from sepsis. Continue gentle hydration. Hold nephrotoxic agents this time. Monitor BMP daily. Consult nephrology if indicated  7. Acute hypoxic respiratory failure-likely secondary to #3 to #5.   Continue as #3 #5. Pulmonology consulted. Continue hyperinflation, mucus mobilization, oxygenation, and bronchodilation therapies. Presence of respiratory distress, tachypnea, dyspnea, shortness of breath,   wheezing  Unable to speak in complete sentences  8. Anxiety/depression-continue home medications. 9.  Diabetes type 2-diabetic diet. BMP daily. Patient takes Ozempic once a week  10. GERD-continue H2 blocker    Code Status: Full code  DVT prophylaxis: Lovenox      NOTE: This report was transcribed using voice recognition software. Every effort was made to ensure accuracy; however, inadvertent computerized transcription errors may be present.   Electronically signed by Karen Davila MD on 11/20/2022 at 7:56 PM

## 2022-11-21 NOTE — PROGRESS NOTES
Aubrey Aguayo was ordered amphetamine-dextroamphetamine (ADDERALL) and Semaglutide [facility]) which are nonformulary medications. The patient has indicated that the home supply of these medications will be brought in to the hospital for inpatient use. If the medications have not been administered by 1400 on the following day from the time the orders were placed, a pharmacist will follow-up with the nurse of the patient to assess the capability of the patient to bring in the medications. If it is determined that the patient cannot supply the medications and they are not available to be dispensed from the pharmacy, a call will be placed to the ordering provider to discuss alternative options.   Khloe Ruiz, [address]  11/21/2022  12:02 AM

## 2022-11-21 NOTE — PROGRESS NOTES
Consults sent to ID Dr. Daryn Chung, Urology Dr. Nagi Donnelly.     Electronically signed by Ismael Anderson RN on 11/21/2022 at 6:54 AM

## 2022-11-21 NOTE — CARE COORDINATION
Social Work / Discharge Planning :Patient with Pleural Effusion. Patient recently discharged days ago from admission due to septic shock. Patient discharged ON ORAL. ID has been  Patient had cysto/stent placement in [facility] last week. Patient resides with spouse in ranch home. Independent  Follows with physicians at the [facility]. Dr Sampson Pretty. Plan is home . If IV needed at discharge currently once a day. Patient can do infusion center. Await treatment plan. SW to follow.  Electronically signed by INA Callejas on 11/21/22 at 9:10 AM EST

## 2022-11-21 NOTE — PROGRESS NOTES
Heidi Devlin Hospitalist   Progress Note    Admitting Date and Time: 11/20/2022  4:45 PM  Admit Dx: Pleural effusion [J90]  Sepsis ([facility] [A41.9]  Sepsis without acute organ dysfunction, due to unspecified organism ([facility] [A41.9]    Subjective:    Patient was admitted with Pleural effusion [J90]  Sepsis ([facility] [A41.9]  Sepsis without acute organ dysfunction, due to unspecified organism ([facility] [A41.9]. Patient denies fever, chills, cp, sob, n/v.     oseltamivir  75 mg Oral BID    meropenem  1,000 mg IntraVENous Q8H    DULoxetine  60 mg Oral Nightly    lactobacillus  1 capsule Oral Daily    QUEtiapine  200 mg Oral Nightly    [START ON 11/26/2022] Semaglutide (1 MG/DOSE)  1 mg SubCUTAneous Weekly    tamsulosin  0.4 mg Oral QAM    sodium chloride flush  5-40 mL IntraVENous 2 times per day    enoxaparin  30 mg SubCUTAneous BID    ipratropium-albuterol  1 ampule Inhalation Q4H WA     benzonatate, 100 mg, TID PRN  HYDROcodone homatropine, 5 mL, Q4H PRN  amphetamine-dextroamphetamine, 20 mg, TID PRN  clonazePAM, 0.5 mg, BID PRN  famotidine, 40 mg, Daily PRN  oxybutynin, 10 mg, Daily PRN  oxyCODONE, 5 mg, Q6H PRN  sodium chloride flush, 5-40 mL, PRN  sodium chloride, , PRN  ondansetron, 4 mg, Q8H PRN   Or  ondansetron, 4 mg, Q6H PRN  polyethylene glycol, 17 g, Daily PRN  acetaminophen, 650 mg, Q6H PRN   Or  acetaminophen, 650 mg, Q6H PRN         Objective:        PHYSICAL EXAM:    Vitals:  BP (!) 145/82   Pulse 91   Temp 99.5 °F (37.5 °C) (Oral)   Resp 18   Ht 6' 3\" (1.905 m)   Wt 226 lb 3.2 oz (102.6 kg)   SpO2 98%   BMI 28.27 kg/m²     General:  Appears uncomfortable. Answers questions appropriately and cooperative with exam  HEENT:  Mucous membranes moist. No erythema, rhinorrhea, or post-nasal drip noted. Neck:  No carotid bruits. Heart:  Rhythm regular at rate of 92  Lungs:  CTA. No wheeze, rales, or rhonchi  Abdomen:  Positive bowel sounds positive. Soft. Non-tender.  No guarding, rebound or rigidity. Breast/Rectal/Genitourinary: not pertinent. Extremities:  Negative for lower extremity edema  Skin:  Warm and dry  Vascular: 2/4 Dorsalis Pedis pulses bilaterally. Neuro:  Cranial nerves 2-12 grossly intact, no focal weakness or change in sensation noted. Extraocular muscles intact. Pupils equal, round, reactive to light. Recent Labs     11/19/22  0513 11/20/22  1716 11/21/22  1050    135 135   K 3.8 3.6 3.4*    97* 98   CO2 27 27 26   BUN 14 20 25*   CREATININE 1.1 1.4* 1.4*   GLUCOSE 110* 109* 114*   CALCIUM 8.2* 8.6 8.1*       Recent Labs     11/19/22  0513 11/20/22  1716   WBC 12.6* 12.0*   RBC 3.46* 3.75*   HGB 10.5* 11.1*   HCT 31.5* 33.5*   MCV 91.0 89.3   MCH 30.3 29.6   MCHC 33.3 33.1   RDW 14.3 14.2    338   MPV 9.6 9.1       CMP:    Lab Results   Component Value Date/Time     11/21/2022 10:50 AM    K 3.4 11/21/2022 10:50 AM    CL 98 11/21/2022 10:50 AM    CO2 26 11/21/2022 10:50 AM    BUN 25 11/21/2022 10:50 AM    CREATININE 1.4 11/21/2022 10:50 AM    GFRAA >60 06/13/2021 03:00 AM    LABGLOM 58 11/21/2022 10:50 AM    GLUCOSE 114 11/21/2022 10:50 AM    PROT 5.4 11/21/2022 10:50 AM    LABALBU 2.5 11/21/2022 10:50 AM    CALCIUM 8.1 11/21/2022 10:50 AM    BILITOT 0.4 11/21/2022 10:50 AM    ALKPHOS 82 11/21/2022 10:50 AM    AST 96 11/21/2022 10:50 AM    ALT 80 11/21/2022 10:50 AM     Magnesium:    Lab Results   Component Value Date/Time    MG 1.9 11/21/2022 10:50 AM        Radiology:   CT ABDOMEN PELVIS WO CONTRAST Additional Contrast? None   Final Result   1. Stable, satisfactory position of left ureteral stent. 2. Redemonstration of mild left hydronephrosis and left perinephric edema. 3. Nonobstructing right renal calculus. 4. Diverticulosis. 5. New bilateral pleural effusions with new opacities in posterior lower   lobes related to atelectasis or pneumonia. 6. Hypoattenuating lesions associated with both kidneys likely represent   cysts. Ultrasound recommended for further evaluation. XR CHEST PORTABLE   Final Result   1. No acute cardiopulmonary pathology seen. Stable atherosclerotic disease. 2.  Stable rib fracture on the right. Interval removal of right IJ catheter. XR CHEST PORTABLE    (Results Pending)       Assessment:    Principal Problem:    Sepsis ([address]  Resolved Problems:    * No resolved hospital problems.  *      Plan:  Sepsis(fever, tachycardia, infection)POA supportive care and tx underlying infection  Complicated uti with recent stent placement   continue abx  ID following   Influenza A infection continue tamiflu  Keron  iv fluids  Dehydration iv fluids  Hypokalemia monitor and replace prn  Depression continue med    Chart reviewed and updated by nursing    Time spent is 35 min    Electronically signed by Jose Avila DO on 11/21/2022 at 5:18 PM

## 2022-11-21 NOTE — PROGRESS NOTES
Occupational Therapy      Occupational Therapy referral received  Spoke with patient - reports he has been walking to/from bathroom- able to complete own self care   No acute OT needs at this time   OT order discontinued

## 2022-11-22 ENCOUNTER — APPOINTMENT (OUTPATIENT)
Dept: GENERAL RADIOLOGY | Age: 57
DRG: 871 | End: 2022-11-22
Payer: MEDICARE

## 2022-11-22 LAB
ALBUMIN SERPL-MCNC: 2.6 G/DL (ref 3.5–5.2)
ALP BLD-CCNC: 74 U/L (ref 40–129)
ALT SERPL-CCNC: 68 U/L (ref 0–40)
ANION GAP SERPL CALCULATED.3IONS-SCNC: 9 MMOL/L (ref 7–16)
AST SERPL-CCNC: 78 U/L (ref 0–39)
BASOPHILS ABSOLUTE: 0.03 E9/L (ref 0–0.2)
BASOPHILS RELATIVE PERCENT: 0.2 % (ref 0–2)
BILIRUB SERPL-MCNC: 0.4 MG/DL (ref 0–1.2)
BUN BLDV-MCNC: 19 MG/DL (ref 6–20)
CALCIUM SERPL-MCNC: 8.1 MG/DL (ref 8.6–10.2)
CHLORIDE BLD-SCNC: 100 MMOL/L (ref 98–107)
CO2: 27 MMOL/L (ref 22–29)
CREAT SERPL-MCNC: 1.4 MG/DL (ref 0.7–1.2)
EOSINOPHILS ABSOLUTE: 0.03 E9/L (ref 0.05–0.5)
EOSINOPHILS RELATIVE PERCENT: 0.2 % (ref 0–6)
GFR SERPL CREATININE-BSD FRML MDRD: 58 ML/MIN/1.73
GLUCOSE BLD-MCNC: 96 MG/DL (ref 74–99)
HCT VFR BLD CALC: 31 % (ref 37–54)
HEMOGLOBIN: 10 G/DL (ref 12.5–16.5)
IMMATURE GRANULOCYTES #: 0.21 E9/L
IMMATURE GRANULOCYTES %: 1.7 % (ref 0–5)
L. PNEUMOPHILA SEROGP 1 UR AG: NORMAL
LYMPHOCYTES ABSOLUTE: 0.7 E9/L (ref 1.5–4)
LYMPHOCYTES RELATIVE PERCENT: 5.8 % (ref 20–42)
MAGNESIUM: 2 MG/DL (ref 1.6–2.6)
MCH RBC QN AUTO: 29.8 PG (ref 26–35)
MCHC RBC AUTO-ENTMCNC: 32.3 % (ref 32–34.5)
MCV RBC AUTO: 92.3 FL (ref 80–99.9)
MONOCYTES ABSOLUTE: 0.96 E9/L (ref 0.1–0.95)
MONOCYTES RELATIVE PERCENT: 7.9 % (ref 2–12)
NEUTROPHILS ABSOLUTE: 10.18 E9/L (ref 1.8–7.3)
NEUTROPHILS RELATIVE PERCENT: 84.2 % (ref 43–80)
PDW BLD-RTO: 14.9 FL (ref 11.5–15)
PHOSPHORUS: 4.3 MG/DL (ref 2.5–4.5)
PLATELET # BLD: 343 E9/L (ref 130–450)
PMV BLD AUTO: 9.9 FL (ref 7–12)
POTASSIUM SERPL-SCNC: 3.8 MMOL/L (ref 3.5–5)
RBC # BLD: 3.36 E12/L (ref 3.8–5.8)
SODIUM BLD-SCNC: 136 MMOL/L (ref 132–146)
STREP PNEUMONIAE ANTIGEN, URINE: NORMAL
TOTAL PROTEIN: 5.7 G/DL (ref 6.4–8.3)
WBC # BLD: 12.1 E9/L (ref 4.5–11.5)

## 2022-11-22 PROCEDURE — 71045 X-RAY EXAM CHEST 1 VIEW: CPT

## 2022-11-22 PROCEDURE — 87040 BLOOD CULTURE FOR BACTERIA: CPT

## 2022-11-22 PROCEDURE — A4216 STERILE WATER/SALINE, 10 ML: HCPCS | Performed by: REGISTERED NURSE

## 2022-11-22 PROCEDURE — 84100 ASSAY OF PHOSPHORUS: CPT

## 2022-11-22 PROCEDURE — 83735 ASSAY OF MAGNESIUM: CPT

## 2022-11-22 PROCEDURE — 94640 AIRWAY INHALATION TREATMENT: CPT

## 2022-11-22 PROCEDURE — 36415 COLL VENOUS BLD VENIPUNCTURE: CPT

## 2022-11-22 PROCEDURE — 6370000000 HC RX 637 (ALT 250 FOR IP): Performed by: STUDENT IN AN ORGANIZED HEALTH CARE EDUCATION/TRAINING PROGRAM

## 2022-11-22 PROCEDURE — 6360000002 HC RX W HCPCS: Performed by: SPECIALIST

## 2022-11-22 PROCEDURE — 99232 SBSQ HOSP IP/OBS MODERATE 35: CPT | Performed by: INTERNAL MEDICINE

## 2022-11-22 PROCEDURE — 80053 COMPREHEN METABOLIC PANEL: CPT

## 2022-11-22 PROCEDURE — 6360000002 HC RX W HCPCS: Performed by: STUDENT IN AN ORGANIZED HEALTH CARE EDUCATION/TRAINING PROGRAM

## 2022-11-22 PROCEDURE — 2580000003 HC RX 258: Performed by: INTERNAL MEDICINE

## 2022-11-22 PROCEDURE — 6360000002 HC RX W HCPCS: Performed by: REGISTERED NURSE

## 2022-11-22 PROCEDURE — 2580000003 HC RX 258: Performed by: REGISTERED NURSE

## 2022-11-22 PROCEDURE — 6370000000 HC RX 637 (ALT 250 FOR IP): Performed by: SPECIALIST

## 2022-11-22 PROCEDURE — 2580000003 HC RX 258: Performed by: SPECIALIST

## 2022-11-22 PROCEDURE — 6370000000 HC RX 637 (ALT 250 FOR IP): Performed by: INTERNAL MEDICINE

## 2022-11-22 PROCEDURE — 85025 COMPLETE CBC W/AUTO DIFF WBC: CPT

## 2022-11-22 PROCEDURE — 2060000000 HC ICU INTERMEDIATE R&B

## 2022-11-22 RX ADMIN — ENOXAPARIN SODIUM 30 MG: 100 INJECTION SUBCUTANEOUS at 20:59

## 2022-11-22 RX ADMIN — OXYBUTYNIN CHLORIDE 10 MG: 10 TABLET, EXTENDED RELEASE ORAL at 09:38

## 2022-11-22 RX ADMIN — ENOXAPARIN SODIUM 30 MG: 100 INJECTION SUBCUTANEOUS at 09:38

## 2022-11-22 RX ADMIN — IPRATROPIUM BROMIDE AND ALBUTEROL SULFATE 1 AMPULE: 2.5; .5 SOLUTION RESPIRATORY (INHALATION) at 09:24

## 2022-11-22 RX ADMIN — SODIUM CHLORIDE: 9 INJECTION, SOLUTION INTRAVENOUS at 20:59

## 2022-11-22 RX ADMIN — OSELTAMIVIR PHOSPHATE 75 MG: 75 CAPSULE ORAL at 09:38

## 2022-11-22 RX ADMIN — HYDROCODONE BITARTRATE AND HOMATROPINE METHYLBROMIDE 5 ML: 5; 1.5 SOLUTION ORAL at 04:02

## 2022-11-22 RX ADMIN — MEROPENEM 1000 MG: 1 INJECTION, POWDER, FOR SOLUTION INTRAVENOUS at 12:34

## 2022-11-22 RX ADMIN — MEROPENEM 1000 MG: 1 INJECTION, POWDER, FOR SOLUTION INTRAVENOUS at 04:04

## 2022-11-22 RX ADMIN — Medication 1 CAPSULE: at 09:38

## 2022-11-22 RX ADMIN — TAMSULOSIN HYDROCHLORIDE 0.4 MG: 0.4 CAPSULE ORAL at 09:38

## 2022-11-22 RX ADMIN — OSELTAMIVIR PHOSPHATE 75 MG: 75 CAPSULE ORAL at 21:00

## 2022-11-22 RX ADMIN — IPRATROPIUM BROMIDE AND ALBUTEROL SULFATE 1 AMPULE: 2.5; .5 SOLUTION RESPIRATORY (INHALATION) at 13:11

## 2022-11-22 RX ADMIN — ERTAPENEM SODIUM 1000 MG: 1 INJECTION, POWDER, LYOPHILIZED, FOR SOLUTION INTRAMUSCULAR; INTRAVENOUS at 21:01

## 2022-11-22 RX ADMIN — IPRATROPIUM BROMIDE AND ALBUTEROL SULFATE 1 AMPULE: 2.5; .5 SOLUTION RESPIRATORY (INHALATION) at 16:26

## 2022-11-22 ASSESSMENT — PAIN SCALES - GENERAL: PAINLEVEL_OUTOF10: 0

## 2022-11-22 NOTE — CONSULTS
Titi Dunn M.D. Toño Alas M.D. Acey Gable, M.D. Charmayne Crystal, D.O. Patient:  Noah Smalls 61 y.o. male MRN: [id number]     Date of Service: 11/21/2022      PULMONARY CONSULTATION    Reason for Consultation: Pleural effusion, pneumonia  Referring Physician: Theo Phillips DO    Chief Complaint   Patient presents with    Fever     Discharged yesterday from here, hospitalized for sepsis          Code Status: Full Code        SUBJECTIVE:    HPI:  This is a 26-year-old male with recent hospitalization for complicated UTI with septic shock with recent stent placement. Patient was in critical care unit for a day. Patient treated with antibiotics and IV fluid resuscitation. He was discharged 11/19. Patient returns with fever, cough, malaise and chills 11/20. CT abdomen pelvis performed showing basilar effusions with atelectasis or pneumonia. Pulmonary consult for further recommendations. Further testing has revealed positive influenza A. He is bacteremic with ESBL E. coli. Patient having significant cough that is minimally productive. Having some chest discomfort with a cough.       Past Medical History:   Diagnosis Date    Anxiety     Depression     Diabetes mellitus ([address]     Fibromyalgia     Left retinal detachment     Metabolic syndrome     Obesity      Past Surgical History:   Procedure Laterality Date    CHOLECYSTECTOMY      RETINAL DETACHMENT SURGERY Left 8/28/2020    LEFT EYE PARS PLANA VITRECTOMY 25 GAUGE RETINAL DETACHMENT REPAIR LASER AND GAS FLUID EXCHANGE [facility]) performed by Nannette Arrieta MD at [address] [place] [address]  2015    VASECTOMY  2004     Family History   Family history unknown: Yes         Social History:   Social History     Socioeconomic History    Marital status:      Spouse name: Not on file    Number of children: Not on file    Years of education: Not on file    Highest education level: Not on file   Occupational History    Not on file   Tobacco Use    Smoking status: Never    Smokeless tobacco: Never   Vaping Use    Vaping Use: Never used   Substance and Sexual Activity    Alcohol use: No    Drug use: No    Sexual activity: Not on file   Other Topics Concern    Not on file   Social History Narrative    Not on file     Social Determinants of Health     Financial Resource Strain: Not on file   Food Insecurity: Not on file   Transportation Needs: Not on file   Physical Activity: Not on file   Stress: Not on file   Social Connections: Not on file   Intimate Partner Violence: Not on file   Housing Stability: Not on file         Vaccines:    Immunization History   Administered Date(s) Administered    COVID-19, MODERNA BLUE border, Primary or Immunocompromised, (age 12y+), IM, 100 mcg/0.5mL 03/20/2021, 04/28/2021    COVID-19, PFIZER Bivalent BOOSTER, (age 12y+), IM, 30 mcg/0.3 mL dose 10/07/2022    Influenza Vaccine, unspecified formulation 12/20/2010    Influenza Virus Vaccine 12/20/2010    Influenza Whole 10/19/2007        Home Meds: Medications Prior to Admission: cefdinir (OMNICEF) 300 MG capsule, Take 1 capsule by mouth every 12 hours for 10 days  levoFLOXacin (LEVAQUIN) 500 MG tablet, Take 1 tablet by mouth daily for 10 days  lactobacillus (CULTURELLE) CAPS capsule, Take 1 capsule by mouth daily  oxyCODONE 5 MG capsule, Take 1 capsule by mouth every 6 hours as needed for Pain for up to 3 days. amphetamine-dextroamphetamine (ADDERALL) 20 MG tablet, Take 20 mg by mouth 3 times daily as needed (TO IMPROVE FOCUS).   ondansetron (ZOFRAN-ODT) 4 MG disintegrating tablet, Take 4 mg by mouth every 8 hours as needed for Nausea or Vomiting  oxybutynin (DITROPAN-XL) 10 MG extended release tablet, Take 10 mg by mouth daily as needed (BLADDER SPASMS)  Semaglutide, 1 MG/DOSE, (OZEMPIC, 1 MG/DOSE,) 4 MG/3ML SOPN, Inject 1 mg into the skin once a week **SATURDAYS**  tamsulosin (FLOMAX) 0.4 MG capsule, Take 0.4 mg by mouth every morning  clonazePAM (KLONOPIN) 0.5 MG tablet, Take 0.5 mg by mouth 2 times daily as needed for Anxiety. famotidine (PEPCID) 40 MG tablet, Take 40 mg by mouth daily as needed (HEARTBURN)  DULoxetine (CYMBALTA) 60 MG extended release capsule, Take 60 mg by mouth nightly  sildenafil (VIAGRA) 100 MG tablet, Take 100 mg by mouth as needed for Erectile Dysfunction  QUEtiapine (SEROQUEL) 200 MG tablet, Take 200 mg by mouth nightly    CURRENT MEDS :  Scheduled Meds:   oseltamivir  75 mg Oral BID    meropenem  1,000 mg IntraVENous Q8H    DULoxetine  60 mg Oral Nightly    lactobacillus  1 capsule Oral Daily    QUEtiapine  200 mg Oral Nightly    [START ON 11/26/2022] Semaglutide (1 MG/DOSE)  1 mg SubCUTAneous Weekly    tamsulosin  0.4 mg Oral QAM    sodium chloride flush  5-40 mL IntraVENous 2 times per day    enoxaparin  30 mg SubCUTAneous BID    ipratropium-albuterol  1 ampule Inhalation Q4H WA       Continuous Infusions:   sodium chloride      sodium chloride         Allergies   Allergen Reactions    Statins Myalgia       REVIEW OF SYSTEMS:  REVIEW OF SYMPTOMS:  Review of Systems   Constitutional:  Positive for chills, diaphoresis, fatigue and fever. Eyes: Negative. Respiratory:  Positive for cough. Cardiovascular:  Negative for chest pain and leg swelling. Gastrointestinal:  Negative for abdominal pain, nausea and vomiting. Endocrine: Negative for cold intolerance and heat intolerance. Genitourinary:  Negative for difficulty urinating and dysuria. Musculoskeletal: Negative. Skin:  Negative for color change and rash. Allergic/Immunologic: Negative for environmental allergies and immunocompromised state. Neurological:  Negative for dizziness and weakness. Psychiatric/Behavioral:  Negative for agitation and confusion.          OBJECTIVE:   BP (!) 145/82   Pulse 91   Temp 99.5 °F (37.5 °C) (Oral)   Resp 18   Ht 6' 3\" (1.905 m)   Wt 226 lb 3.2 oz (102.6 kg)   SpO2 98%   BMI 28.27 kg/m²   SpO2 Readings from Last 1 Encounters:   11/21/22 98%        I/O:  No intake or output data in the 24 hours ending 11/21/22 1910                   PHYSICAL EXAM:  Physical Exam  Constitutional:       General: He is not in acute distress. HENT:      Head: Normocephalic and atraumatic. Mouth/Throat:      Pharynx: No oropharyngeal exudate. Eyes:      General: No scleral icterus. Conjunctiva/sclera: Conjunctivae normal.   Neck:      Trachea: No tracheal deviation. Cardiovascular:      Rate and Rhythm: Normal rate. Heart sounds: Normal heart sounds. Pulmonary:      Effort: Pulmonary effort is normal.      Breath sounds: Normal breath sounds. Abdominal:      Palpations: Abdomen is soft. Tenderness: There is no abdominal tenderness. Musculoskeletal:         General: No swelling or deformity. Cervical back: Neck supple. Lymphadenopathy:      Cervical: No cervical adenopathy. Skin:     General: Skin is warm. Findings: No rash. Neurological:      General: No focal deficit present. Mental Status: He is alert and oriented to person, place, and time. Psychiatric:         Behavior: Behavior normal.       Pulmonary Function Testing personally reviewed and interpreted. PERTINENT LAB RESULTS: Labs reviewed. DIAGNOSTICS: Pertinent imaging reviewed. ASSESSMENT:     1.  Bilateral small pleural effusions with associated atelectasis. Findings of pleural effusions setting of previous volume resuscitation. Doubt pneumonia. 2.  Influenza A infection    3. Sepsis with bacteremia from complicated UTI with ESBL E. Coli    4. CKD      PLAN:     Continue Tamiflu  Antibiotics per ID  Antitussives prn  Incentive spirometry    Pulmonary will sign off at this time. Reconsult if needed. Thank you for allowing me to participate in the care of [facility]. Please feel free to call with questions.        Electronically signed by Belle Burroughs DO on 11/21/2022 at 7:10 PM

## 2022-11-22 NOTE — PLAN OF CARE
Problem: Pain  Goal: Verbalizes/displays adequate comfort level or baseline comfort level  Outcome: Progressing     Problem: Safety - Adult  Goal: Free from fall injury  Outcome: Progressing  Flowsheets (Taken 11/22/2022 2698)  Free From Fall Injury: Instruct family/caregiver on patient safety     Problem: Chronic Conditions and Co-morbidities  Goal: Patient's chronic conditions and co-morbidity symptoms are monitored and maintained or improved  Outcome: Progressing

## 2022-11-22 NOTE — CARE COORDINATION
Social Work / Discharge Planning : ID updated SW that patient will need IV invanz once a day for 9 days. SW spoke to patient. Patient asked about [facility]. SW explained patient is independent and does  NOT meet homebound criteria. SW explained Infusion Center. Patient agreeable. SW called  [facility] and spoke to Evan Roth and gave referral. Eunice Cara faxed [phone]. Patient  will have to get his IV antibiotic tomorrow prior to D/C per infusion center. SW to follow.  Electronically signed by INA Rodarte on 11/22/22 at 2:13 PM EST

## 2022-11-22 NOTE — PROGRESS NOTES
Heidi Devlin Hospitalist   Progress Note    Admitting Date and Time: 11/20/2022  4:45 PM  Admit Dx: Pleural effusion [J90]  Sepsis ([facility] [A41.9]  Sepsis without acute organ dysfunction, due to unspecified organism ([facility] [A41.9]    Subjective:    Patient was admitted with Pleural effusion [J90]  Sepsis ([facility] [A41.9]  Sepsis without acute organ dysfunction, due to unspecified organism ([facility] [A41.9]. Patient denies  chills, cp, sob, n/v. Pt states he had a fever during the night.      oseltamivir  75 mg Oral BID    meropenem  1,000 mg IntraVENous Q8H    DULoxetine  60 mg Oral Nightly    lactobacillus  1 capsule Oral Daily    QUEtiapine  200 mg Oral Nightly    [START ON 11/26/2022] Semaglutide (1 MG/DOSE)  1 mg SubCUTAneous Weekly    tamsulosin  0.4 mg Oral QAM    sodium chloride flush  5-40 mL IntraVENous 2 times per day    enoxaparin  30 mg SubCUTAneous BID    ipratropium-albuterol  1 ampule Inhalation Q4H WA     benzonatate, 100 mg, TID PRN  HYDROcodone homatropine, 5 mL, Q4H PRN  amphetamine-dextroamphetamine, 20 mg, TID PRN  clonazePAM, 0.5 mg, BID PRN  famotidine, 40 mg, Daily PRN  oxybutynin, 10 mg, Daily PRN  oxyCODONE, 5 mg, Q6H PRN  sodium chloride flush, 5-40 mL, PRN  sodium chloride, , PRN  ondansetron, 4 mg, Q8H PRN   Or  ondansetron, 4 mg, Q6H PRN  polyethylene glycol, 17 g, Daily PRN  acetaminophen, 650 mg, Q6H PRN   Or  acetaminophen, 650 mg, Q6H PRN         Objective:    /71   Pulse 83   Temp 98.2 °F (36.8 °C) (Oral)   Resp 18   Ht 6' 3\" (1.905 m)   Wt 227 lb 6.4 oz (103.1 kg)   SpO2 96%   BMI 28.42 kg/m²   Skin: warm and dry, no rash or erythema  Pulmonary/Chest: clear to auscultation bilaterally- no wheezes, rales or rhonchi, normal air movement, no respiratory distress  Cardiovascular: rhythm reg at rate of 84  Abdomen: soft, non-tender, non-distended, normal bowel sounds, no masses or organomegaly  Extremities: no cyanosis, no clubbing, and no edema      Recent Labs 11/20/22  1716 11/21/22  1050 11/22/22  0318    135 136   K 3.6 3.4* 3.8   CL 97* 98 100   CO2 27 26 27   BUN 20 25* 19   CREATININE 1.4* 1.4* 1.4*   GLUCOSE 109* 114* 96   CALCIUM 8.6 8.1* 8.1*       Recent Labs     11/20/22  1716 11/22/22 0318   WBC 12.0* 12.1*   RBC 3.75* 3.36*   HGB 11.1* 10.0*   HCT 33.5* 31.0*   MCV 89.3 92.3   MCH 29.6 29.8   MCHC 33.1 32.3   RDW 14.2 14.9    343   MPV 9.1 9.9       CBC with Differential:    Lab Results   Component Value Date/Time    WBC 12.1 11/22/2022 03:18 AM    RBC 3.36 11/22/2022 03:18 AM    HGB 10.0 11/22/2022 03:18 AM    HCT 31.0 11/22/2022 03:18 AM     11/22/2022 03:18 AM    MCV 92.3 11/22/2022 03:18 AM    MCH 29.8 11/22/2022 03:18 AM    MCHC 32.3 11/22/2022 03:18 AM    RDW 14.9 11/22/2022 03:18 AM    NRBC 0.0 11/16/2022 11:54 AM    SEGSPCT 77 09/18/2013 08:40 PM    METASPCT 2.0 11/20/2022 05:16 PM    LYMPHOPCT 5.8 11/22/2022 03:18 AM    MONOPCT 7.9 11/22/2022 03:18 AM    BASOPCT 0.2 11/22/2022 03:18 AM    MONOSABS 0.96 11/22/2022 03:18 AM    LYMPHSABS 0.70 11/22/2022 03:18 AM    EOSABS 0.03 11/22/2022 03:18 AM    BASOSABS 0.03 11/22/2022 03:18 AM     CMP:    Lab Results   Component Value Date/Time     11/22/2022 03:18 AM    K 3.8 11/22/2022 03:18 AM    K 3.4 11/21/2022 10:50 AM     11/22/2022 03:18 AM    CO2 27 11/22/2022 03:18 AM    BUN 19 11/22/2022 03:18 AM    CREATININE 1.4 11/22/2022 03:18 AM    GFRAA >60 06/13/2021 03:00 AM    LABGLOM 58 11/22/2022 03:18 AM    GLUCOSE 96 11/22/2022 03:18 AM    PROT 5.7 11/22/2022 03:18 AM    LABALBU 2.6 11/22/2022 03:18 AM    CALCIUM 8.1 11/22/2022 03:18 AM    BILITOT 0.4 11/22/2022 03:18 AM    ALKPHOS 74 11/22/2022 03:18 AM    AST 78 11/22/2022 03:18 AM    ALT 68 11/22/2022 03:18 AM     Magnesium:    Lab Results   Component Value Date/Time    MG 2.0 11/22/2022 03:18 AM     Phosphorus:    Lab Results   Component Value Date/Time    PHOS 4.3 11/22/2022 03:18 AM        Radiology:   CT ABDOMEN PELVIS WO CONTRAST Additional Contrast? None   Final Result   1. Stable, satisfactory position of left ureteral stent. 2. Redemonstration of mild left hydronephrosis and left perinephric edema. 3. Nonobstructing right renal calculus. 4. Diverticulosis. 5. New bilateral pleural effusions with new opacities in posterior lower   lobes related to atelectasis or pneumonia. 6. Hypoattenuating lesions associated with both kidneys likely represent   cysts. Ultrasound recommended for further evaluation. XR CHEST PORTABLE   Final Result   1. No acute cardiopulmonary pathology seen. Stable atherosclerotic disease. 2.  Stable rib fracture on the right. Interval removal of right IJ catheter. XR CHEST PORTABLE    (Results Pending)       Assessment:    Principal Problem:    Sepsis ([address]  Active Problems:    Complicated UTI (urinary tract infection)    Influenza A    Dehydration  Resolved Problems:    * No resolved hospital problems. *      Plan:  Sepsis(fever, tachycardia, infection)POA supportive care and tx underlying infection  Complicated uti with recent stent placement   continue abx  ID following   Influenza A infection continue tamiflu  Keron  iv fluids  Dehydration iv fluids  Hypokalemia monitor and replace prn  Depression continue med    Continue to monitor fever trend. Continue iv fluids as is today and consider stopping tomorrow.          Electronically signed by Taylor Heath DO on 11/22/2022 at 9:13 AM

## 2022-11-22 NOTE — PROGRESS NOTES
[address] [place] [address] [facility]  ROMANIDA  Progress Note    SUBJECTIVE:  Chief Complaint   Patient presents with    Fever     Discharged yesterday from here, hospitalized for sepsis      Patient is tolerating medications. No reported adverse drug reactions. No nausea, vomiting, diarrhea. Says the chills have subsided  No fevers charted. Review of systems:  As stated above in the chief complaint, otherwise negative. Medications:  Scheduled Meds:   oseltamivir  75 mg Oral BID    meropenem  1,000 mg IntraVENous Q8H    DULoxetine  60 mg Oral Nightly    lactobacillus  1 capsule Oral Daily    QUEtiapine  200 mg Oral Nightly    [START ON 2022] Semaglutide (1 MG/DOSE)  1 mg SubCUTAneous Weekly    tamsulosin  0.4 mg Oral QAM    sodium chloride flush  5-40 mL IntraVENous 2 times per day    enoxaparin  30 mg SubCUTAneous BID    ipratropium-albuterol  1 ampule Inhalation Q4H WA     Continuous Infusions:   sodium chloride      sodium chloride       PRN Meds:benzonatate, HYDROcodone homatropine, amphetamine-dextroamphetamine, clonazePAM, famotidine, oxybutynin, oxyCODONE, sodium chloride flush, sodium chloride, ondansetron **OR** ondansetron, polyethylene glycol, acetaminophen **OR** acetaminophen    OBJECTIVE:  /71   Pulse 83   Temp 98.2 °F (36.8 °C) (Oral)   Resp 18   Ht 6' 3\" (1.905 m)   Wt 227 lb 6.4 oz (103.1 kg)   SpO2 92%   BMI 28.42 kg/m²   Temp  Av.1 °F (37.3 °C)  Min: 98.2 °F (36.8 °C)  Max: 100 °F (37.8 °C)  Constitutional: The patient is awake, alert, and oriented. Skin: Warm and dry. No rashes were noted. HEENT: Round and reactive pupils. Moist mucous membranes. No ulcerations or thrush. Neck: Supple to movements. Chest: No use of accessory muscles to breathe. Symmetrical expansion. No wheezing, crackles or rhonchi. Cardiovascular: S1 and S2 are rhythmic and regular. No murmurs appreciated. Abdomen: Positive bowel sounds to auscultation. Benign to palpation.  No masses felt. No hepatosplenomegaly. Extremities: No clubbing, no cyanosis, no edema. Lines: peripheral    Laboratory and Tests Review:  Lab Results   Component Value Date    WBC 12.1 (H) 11/22/2022    WBC 12.0 (H) 11/20/2022    WBC 12.6 (H) 11/19/2022    HGB 10.0 (L) 11/22/2022    HCT 31.0 (L) 11/22/2022    MCV 92.3 11/22/2022     11/22/2022     Lab Results   Component Value Date    NEUTROABS 10.18 (H) 11/22/2022    NEUTROABS 11.04 (H) 11/20/2022    NEUTROABS 9.62 (H) 11/19/2022     No results found for: CRPHS  Lab Results   Component Value Date    ALT 68 (H) 11/22/2022    AST 78 (H) 11/22/2022    ALKPHOS 74 11/22/2022    BILITOT 0.4 11/22/2022     Lab Results   Component Value Date/Time     11/22/2022 03:18 AM    K 3.8 11/22/2022 03:18 AM    K 3.4 11/21/2022 10:50 AM     11/22/2022 03:18 AM    CO2 27 11/22/2022 03:18 AM    BUN 19 11/22/2022 03:18 AM    CREATININE 1.4 11/22/2022 03:18 AM    CREATININE 1.4 11/21/2022 10:50 AM    CREATININE 1.4 11/20/2022 05:16 PM    GFRAA >60 06/13/2021 03:00 AM    LABGLOM 58 11/22/2022 03:18 AM    GLUCOSE 96 11/22/2022 03:18 AM    PROT 5.7 11/22/2022 03:18 AM    LABALBU 2.6 11/22/2022 03:18 AM    CALCIUM 8.1 11/22/2022 03:18 AM    BILITOT 0.4 11/22/2022 03:18 AM    ALKPHOS 74 11/22/2022 03:18 AM    AST 78 11/22/2022 03:18 AM    ALT 68 11/22/2022 03:18 AM     Lab Results   Component Value Date    CRP 13.4 (H) 11/19/2022    CRP 13.4 (H) 11/18/2022    CRP 17.9 (H) 11/17/2022     Lab Results   Component Value Date    SEDRATE 66 (H) 11/19/2022    SEDRATE 63 (H) 11/18/2022    SEDRATE 70 (H) 11/17/2022     Radiology:  Reviewed     Microbiology:   Urine culture 11/16/2022: ESBL + E.coli, Strep species  Blood cultures 11/20/2022: ESBL + E.coli   Blood cultures 11/22/2022: pending  RVP: Influenza A H3  Streptococcus pneumoniae/Legionella urine Ag: pending    Recent Labs     11/21/22  1050   PROCAL 87.25*       ASSESSMENT:  Complicated UTI with sepsis.   Urine cultures from previous admission were ordered but not done until later when we asked microbiology to search with the specimen. These grew ESBL + E. coli and Streptococcus species  Status post cystoscopy with left ureteral stent 11/11/2022 in [facility]  ESBL + E. coli septicemia associated to the above  Influenza A infection  Leukocytosis associated to the above  Elevation of procalcitonin secondary to sepsis    PLAN:  Continue Meropenem and oral Oseltamivir  Repeat blood cultures today   Check final & repeat cultures  Monitor labs -- repeat procal tomorrow   If repeat blood cultures are negative by tomorrow we are hoping to place a PICC for IV antibiotics    ADALBERTO Palacios CNP  9:51 AM  11/22/2022     Patient seen and examined. I had a face to face encounter with the patient. Agree with exam.  Assessment and plan as outlined above and directed by me. Addition and corrections were done as deemed appropriate. My exam and plan include: The patient is feeling better. He did have some chills but no longer diaphoresis. He had a low-grade fever. Cough is improving. Change Meropenem to Ertapenem for a minimum of 10 days at home. If blood cultures are negative by tomorrow he can have a PICC and be discharged. Spoke with case management.     Luis Carroll MD  11/22/2022  2:02 PM

## 2022-11-22 NOTE — DISCHARGE INSTRUCTIONS
[address] [place] [address] [facility]  ([address])  [facility]  [facility] [address]  [place], [address]  Phone [phone]   Fax [phone]    Helen Lo. Na North MD, MD Ivett Ji MD Climmie Promise, MD Génesis Vale, MD Judy Garcia, CNS   Francisco Rayaer, APRN, CNS  Chica Jill, APRN-CNP    Michell Guerra, APRN, CNS  Nghiaraymundo Haynes, APRN-CNP   Brady Chao MD               STANDING ORDERS (ID Protocol)     Visiting nurses are to write the Primary Care Physician and their own call back number on all laboratory requisition forms. Abnormal lab values are called to the physician by the nurse and NOT by the laboratory. Fax all labs to the office in a timely manner, during office hours. All faxes should include nurses name and call back number. Vascular Access Devices or VADs (TLC, PICC, Midline, etc) will be replaced as necessary. Draw all blood work from VADs, except for drug levels. If unable to access a VAD, insert a peripheral catheter temporarily. Contact the Primary Care Physician or [facility] office for surgical referral.  Use tPA (Catana Olivier) as per agency protocol to restore patency of VAD. Saline flush 10ml or heparin flush 10U/cc IV daily and as needed to maintain line patency. Remove VAD upon completion of IV antibiotics, unless otherwise specified by the ordering physician. If VAD cannot be removed, schedule appointment at office for removal.  If VAD was placed by Radiology, schedule appointment for removal.  Notify ordering physician or office if patient requires admission to the hospital with reason for admission. Discontinue all blood work upon completion of IV antibiotics, unless otherwise specified by ordering physician. Notify ordering physician if the patient does not receive the scheduled antibiotic for 24 hours or more.   The Pharmacy and [address] [place] [address] may adjust the timing of the infusion and blood work to accommodate the patients home care conditions. When PICC or VAD is to be removed, documentation of length of inserted PICC. PICC or VAD length is to correlate with inserted length and sent to physician at the time of removal.  Give the patient a list of antibiotics being administered with:  Drug name  Route  Frequency  Start/Stop date      ROUTINE LABS TO BE DRAWN/ORDERED:  Twice weekly (preferably every Monday & Thursday):  CMP  Complete Blood Count with differential (CBC with dif)  Once weekly:  C-Reactive Protein (not high sensitivity CRP)  Erythrocyte/Westergren Sedimentation Rate (WSR or ESR)  Total CPK for patients on Daptomycin (Cubicin®). Obtain CPK more often if the patient is experiencing muscle weakness or myalgias. Clinical Pharmacist is to adjust Vancomycin and Aminoglycosides. Clinical pharmacist is  encouraged to follow: \"Therapeutic Monitoring of Vancomycin for Serious Methicillin-resistant Staphylococcus aureus Infections: A Revised Consensus Guideline and Review by the American Society of Health-system Pharmacists, the Infectious Diseases Society of [facility]2 Dwayne Escalante, the Pediatric Infectious Diseases Society, and the Society of Infectious Diseases Pharmacists\" (https://doi.org/10.1093/belén/mpzb368). If a clinical calculator is not available, clinical pharmacist is to follow the orders below:  Draw Vancomycin trough 30 minutes before the third dose  After starting drug, or   After the dosing or interval is changed. If the trough level is between 5 and 20 continue dose as ordered. Draw troughs twice weekly thereafter until troughs are stable (i.e. until trough is between 10 and 20 mcg/ml for two consecutive laboratory values). Once stable check troughs once weekly or every third dose. Please do not call physician unless the trough is < 5 or >20. If the trough is <20 continue dosing as ordered. If the trough is >20 call the office for further orders.   Do not hold the dose while waiting for the trough result. Amingoglycosides (e.g. Gentamicin, Tobramycin and Amikacin) peaks and troughs should be drawn twice weekly (preferably on Mondays and Thursdays) or every third dose. Aminoglycoside peaks are not to be drawn if patient on Once-Daily Dosing (ODD). Call physician or office if the trough is:     >1 for gentamicin,   >2 for tobramycin, or   >5 for amikacin  When clinically indicated obtain:  Urine culture. If the patient has a fever with purulent drainage from Gerard or suprapubic catheter, or foul smelling urine. Do not irrigate a clogged Gerard catheter. Replace it. Blood cultures and Wound Gram stain with culture & sensitivity. If the patient has a fever or increasing drainage or foul odor from a wound. Notify the treating physician in a timely manner  Stool specimen. If diarrhea occurs while on antibiotics, send stools for C. difficile and WBCs. When a drug is discontinued due to a low white blood cell count (WBCs) draw two consecutive CBC with differential and BUN, Ceatinine. ALLERGIC OR ADVERSE REACTIONS TO MEDICATIONS  Mild reaction: (itching, with or without rash):  Administer Benadryl 50mg po x 1, then 25mg po q6h prn. Notify office or physician in a timely matter. Moderate reaction (itching with or without rash and/or wheezing, dyspnea, itchy throat):  Administer Benadryl 50 mg IV push x 1. Notify office or physician in a timely manner. Severe reaction i.e. Anaphylaxis (wheezing or stidor, sudden rash, lightheadedness, hypotension):  Administer epinephrine subcutaneous 0.3mg (1:1000) x 1 dose. May repeat twice every 5 minutes if needed. Call EMS and notify office or physician immediately. For all above reactions: administer Solu-Cortef 100mg slow IV push x 1. VASCULAR ACCESS DRESSING CHANGE PROTOCOL  Cleanse insertion site with ChlorPrep® or equivalent three times. Secure catheter with Steri-Strips®, Bone®, or equivalent securing device.   Apply Opsite® 3000 or equivalent transparent dressing. Change dressing twice weekly, maintaining sterile technique. If there is a BioPatch®, SilverSite® or equivalent, change once weekly only or as needed. FOLLOW-UP VISITS  Nursing staff should call the office during business hours to schedule a follow-up appointment once the patient is admitted to the service or facility. Every effort should be made to have patient follow-up within 2 weeks of discharge. Exception is made for ventilator-dependent patients. Continue IV antibiotic therapy until patient is seen in the office or unless specific stop date is noted on the original order or unless otherwise ordered by physician. Call office to ensure stop date is correct before stopping antibiotics.         Yung Lundberg MD

## 2022-11-23 VITALS
BODY MASS INDEX: 28.6 KG/M2 | HEART RATE: 79 BPM | TEMPERATURE: 98.3 F | DIASTOLIC BLOOD PRESSURE: 88 MMHG | SYSTOLIC BLOOD PRESSURE: 135 MMHG | RESPIRATION RATE: 18 BRPM | WEIGHT: 230 LBS | HEIGHT: 75 IN | OXYGEN SATURATION: 99 %

## 2022-11-23 LAB
ALBUMIN SERPL-MCNC: 2.7 G/DL (ref 3.5–5.2)
ALP BLD-CCNC: 67 U/L (ref 40–129)
ALT SERPL-CCNC: 58 U/L (ref 0–40)
ANION GAP SERPL CALCULATED.3IONS-SCNC: 9 MMOL/L (ref 7–16)
AST SERPL-CCNC: 66 U/L (ref 0–39)
BASOPHILS ABSOLUTE: 0.02 E9/L (ref 0–0.2)
BASOPHILS RELATIVE PERCENT: 0.2 % (ref 0–2)
BILIRUB SERPL-MCNC: 0.4 MG/DL (ref 0–1.2)
BUN BLDV-MCNC: 15 MG/DL (ref 6–20)
CALCIUM SERPL-MCNC: 8.4 MG/DL (ref 8.6–10.2)
CHLORIDE BLD-SCNC: 98 MMOL/L (ref 98–107)
CO2: 26 MMOL/L (ref 22–29)
CREAT SERPL-MCNC: 1.2 MG/DL (ref 0.7–1.2)
EOSINOPHILS ABSOLUTE: 0.13 E9/L (ref 0.05–0.5)
EOSINOPHILS RELATIVE PERCENT: 1.5 % (ref 0–6)
GFR SERPL CREATININE-BSD FRML MDRD: >60 ML/MIN/1.73
GLUCOSE BLD-MCNC: 84 MG/DL (ref 74–99)
HCT VFR BLD CALC: 29 % (ref 37–54)
HEMOGLOBIN: 9.5 G/DL (ref 12.5–16.5)
IMMATURE GRANULOCYTES #: 0.1 E9/L
IMMATURE GRANULOCYTES %: 1.2 % (ref 0–5)
LYMPHOCYTES ABSOLUTE: 1.03 E9/L (ref 1.5–4)
LYMPHOCYTES RELATIVE PERCENT: 12.1 % (ref 20–42)
MCH RBC QN AUTO: 29.2 PG (ref 26–35)
MCHC RBC AUTO-ENTMCNC: 32.8 % (ref 32–34.5)
MCV RBC AUTO: 89.2 FL (ref 80–99.9)
MONOCYTES ABSOLUTE: 0.75 E9/L (ref 0.1–0.95)
MONOCYTES RELATIVE PERCENT: 8.8 % (ref 2–12)
NEUTROPHILS ABSOLUTE: 6.48 E9/L (ref 1.8–7.3)
NEUTROPHILS RELATIVE PERCENT: 76.2 % (ref 43–80)
PDW BLD-RTO: 14.7 FL (ref 11.5–15)
PLATELET # BLD: 364 E9/L (ref 130–450)
PMV BLD AUTO: 9.7 FL (ref 7–12)
POTASSIUM SERPL-SCNC: 3.8 MMOL/L (ref 3.5–5)
PROCALCITONIN: 93.02 NG/ML (ref 0–0.08)
RBC # BLD: 3.25 E12/L (ref 3.8–5.8)
SODIUM BLD-SCNC: 133 MMOL/L (ref 132–146)
TOTAL PROTEIN: 5.9 G/DL (ref 6.4–8.3)
WBC # BLD: 8.5 E9/L (ref 4.5–11.5)

## 2022-11-23 PROCEDURE — 76937 US GUIDE VASCULAR ACCESS: CPT

## 2022-11-23 PROCEDURE — 84145 PROCALCITONIN (PCT): CPT

## 2022-11-23 PROCEDURE — C1751 CATH, INF, PER/CENT/MIDLINE: HCPCS

## 2022-11-23 PROCEDURE — 36415 COLL VENOUS BLD VENIPUNCTURE: CPT

## 2022-11-23 PROCEDURE — 2500000003 HC RX 250 WO HCPCS

## 2022-11-23 PROCEDURE — 6370000000 HC RX 637 (ALT 250 FOR IP): Performed by: INTERNAL MEDICINE

## 2022-11-23 PROCEDURE — 85025 COMPLETE CBC W/AUTO DIFF WBC: CPT

## 2022-11-23 PROCEDURE — 6370000000 HC RX 637 (ALT 250 FOR IP): Performed by: STUDENT IN AN ORGANIZED HEALTH CARE EDUCATION/TRAINING PROGRAM

## 2022-11-23 PROCEDURE — 80053 COMPREHEN METABOLIC PANEL: CPT

## 2022-11-23 PROCEDURE — 36569 INSJ PICC 5 YR+ W/O IMAGING: CPT

## 2022-11-23 PROCEDURE — 2580000003 HC RX 258: Performed by: INTERNAL MEDICINE

## 2022-11-23 PROCEDURE — 2580000003 HC RX 258

## 2022-11-23 PROCEDURE — 6370000000 HC RX 637 (ALT 250 FOR IP): Performed by: SPECIALIST

## 2022-11-23 PROCEDURE — 94640 AIRWAY INHALATION TREATMENT: CPT

## 2022-11-23 PROCEDURE — 99239 HOSP IP/OBS DSCHRG MGMT >30: CPT | Performed by: INTERNAL MEDICINE

## 2022-11-23 PROCEDURE — 02HV33Z INSERTION OF INFUSION DEVICE INTO SUPERIOR VENA CAVA, PERCUTANEOUS APPROACH: ICD-10-PCS | Performed by: INTERNAL MEDICINE

## 2022-11-23 PROCEDURE — 6360000002 HC RX W HCPCS: Performed by: STUDENT IN AN ORGANIZED HEALTH CARE EDUCATION/TRAINING PROGRAM

## 2022-11-23 RX ORDER — OXYCODONE HYDROCHLORIDE 5 MG/1
5 CAPSULE ORAL EVERY 6 HOURS PRN
Qty: 12 CAPSULE | Refills: 0 | Status: SHIPPED | OUTPATIENT
Start: 2022-11-23 | End: 2022-11-26

## 2022-11-23 RX ORDER — OSELTAMIVIR PHOSPHATE 75 MG/1
75 CAPSULE ORAL 2 TIMES DAILY
Qty: 5 CAPSULE | Refills: 0 | Status: SHIPPED | OUTPATIENT
Start: 2022-11-23 | End: 2022-11-26

## 2022-11-23 RX ORDER — HEPARIN SODIUM (PORCINE) LOCK FLUSH IV SOLN 100 UNIT/ML 100 UNIT/ML
3 SOLUTION INTRAVENOUS PRN
Status: DISCONTINUED | OUTPATIENT
Start: 2022-11-23 | End: 2022-11-23 | Stop reason: HOSPADM

## 2022-11-23 RX ORDER — LIDOCAINE HYDROCHLORIDE 10 MG/ML
5 INJECTION, SOLUTION EPIDURAL; INFILTRATION; INTRACAUDAL; PERINEURAL ONCE
Status: COMPLETED | OUTPATIENT
Start: 2022-11-23 | End: 2022-11-23

## 2022-11-23 RX ORDER — SODIUM CHLORIDE 0.9 % (FLUSH) 0.9 %
5-40 SYRINGE (ML) INJECTION EVERY 12 HOURS SCHEDULED
Status: DISCONTINUED | OUTPATIENT
Start: 2022-11-23 | End: 2022-11-23 | Stop reason: HOSPADM

## 2022-11-23 RX ORDER — SODIUM CHLORIDE 9 MG/ML
INJECTION, SOLUTION INTRAVENOUS PRN
Status: DISCONTINUED | OUTPATIENT
Start: 2022-11-23 | End: 2022-11-23 | Stop reason: HOSPADM

## 2022-11-23 RX ORDER — HEPARIN SODIUM (PORCINE) LOCK FLUSH IV SOLN 100 UNIT/ML 100 UNIT/ML
3 SOLUTION INTRAVENOUS EVERY 12 HOURS SCHEDULED
Status: DISCONTINUED | OUTPATIENT
Start: 2022-11-23 | End: 2022-11-23 | Stop reason: HOSPADM

## 2022-11-23 RX ORDER — SODIUM CHLORIDE 0.9 % (FLUSH) 0.9 %
5-40 SYRINGE (ML) INJECTION PRN
Status: DISCONTINUED | OUTPATIENT
Start: 2022-11-23 | End: 2022-11-23 | Stop reason: HOSPADM

## 2022-11-23 RX ORDER — SODIUM CHLORIDE 0.9 % (FLUSH) 0.9 %
5-40 SYRINGE (ML) INJECTION PRN
Status: CANCELLED | OUTPATIENT
Start: 2022-11-24

## 2022-11-23 RX ADMIN — OSELTAMIVIR PHOSPHATE 75 MG: 75 CAPSULE ORAL at 09:27

## 2022-11-23 RX ADMIN — IPRATROPIUM BROMIDE AND ALBUTEROL SULFATE 1 AMPULE: 2.5; .5 SOLUTION RESPIRATORY (INHALATION) at 13:43

## 2022-11-23 RX ADMIN — Medication 30 ML: at 17:15

## 2022-11-23 RX ADMIN — HYDROCODONE BITARTRATE AND HOMATROPINE METHYLBROMIDE 5 ML: 5; 1.5 SOLUTION ORAL at 00:08

## 2022-11-23 RX ADMIN — TAMSULOSIN HYDROCHLORIDE 0.4 MG: 0.4 CAPSULE ORAL at 09:26

## 2022-11-23 RX ADMIN — LIDOCAINE HYDROCHLORIDE 1.5 ML: 10 SOLUTION INTRAVENOUS at 16:50

## 2022-11-23 RX ADMIN — SODIUM CHLORIDE: 9 INJECTION, SOLUTION INTRAVENOUS at 09:27

## 2022-11-23 RX ADMIN — HYDROCODONE BITARTRATE AND HOMATROPINE METHYLBROMIDE 5 ML: 5; 1.5 SOLUTION ORAL at 03:57

## 2022-11-23 RX ADMIN — IPRATROPIUM BROMIDE AND ALBUTEROL SULFATE 1 AMPULE: 2.5; .5 SOLUTION RESPIRATORY (INHALATION) at 09:29

## 2022-11-23 RX ADMIN — OXYBUTYNIN CHLORIDE 10 MG: 10 TABLET, EXTENDED RELEASE ORAL at 09:26

## 2022-11-23 RX ADMIN — ENOXAPARIN SODIUM 30 MG: 100 INJECTION SUBCUTANEOUS at 09:26

## 2022-11-23 RX ADMIN — Medication 1 CAPSULE: at 09:26

## 2022-11-23 ASSESSMENT — PAIN SCALES - GENERAL
PAINLEVEL_OUTOF10: 0
PAINLEVEL_OUTOF10: 0

## 2022-11-23 NOTE — PLAN OF CARE
Problem: Pain  Goal: Verbalizes/displays adequate comfort level or baseline comfort level  11/23/2022 1749 by Ava Miller RN  Outcome: Adequate for Discharge  11/23/2022 1453 by Ava Miller RN  Outcome: Progressing     Problem: Safety - Adult  Goal: Free from fall injury  11/23/2022 1749 by Ava Miller RN  Outcome: Adequate for Discharge  11/23/2022 1453 by Ava Miller RN  Outcome: Progressing     Problem: Chronic Conditions and Co-morbidities  Goal: Patient's chronic conditions and co-morbidity symptoms are monitored and maintained or improved  11/23/2022 1749 by Ava Miller RN  Outcome: Adequate for Discharge  11/23/2022 1453 by Ava Miller RN  Outcome: Progressing

## 2022-11-23 NOTE — PROGRESS NOTES
Pt seen earlier today. On presentation to room, I came and addressed myself and asked pt how he was doing. I further asked about symptoms. I had remarked a positive statement on how things were going well. However, pt disagreed with me. During the bulk of this conversation, pt fixated on tv and had minimal eye contact. Wife at edge of bed sitting away from me watching tv during the initial part of conversation. When did inquire what his concerns were, he states he was told that he was going to be discharged by this afternoon and that he already should have had a picc line in. I did point out that during my conversations with him that I did not give him these expectations in order to put things into perspective. Wife stated why were the cultures taking so long to get back. I did attempt to answer that cultures do take time and about ID monitoring them. However, by this time, both pt and wife started to make comments and questions such as communication between me and ID in a derisive tone and in rapid succession that it was hard for me to address these comments/questions. I asked them if they would like me to leave the room. They replied yes. I said my goodbye and promptly exited the room at their request. I informed nursing staff and I had also had conversation with Dr Tamar Ramos.

## 2022-11-23 NOTE — DISCHARGE SUMMARY
[facility] EMERGENCY SERVICE Physician Discharge Summary       Ayla Chu, [address] [facility] [address]  [address] [phone]    Schedule an appointment as soon as possible for a visit in 2 week(s)  For outpatient follow up    [address] [address]  [phone]          Activity level: as rossy    Diet: ADULT DIET; Regular; 4 carb choices (60 gm/meal)    Dispo:home    Condition at discharge: fair          Patient ID:  Alma Brady  [id number]  65 y.o.  1965    Admit date: 11/20/2022    Discharge date and time:  11/23/2022  4:54 PM    Admission Diagnoses: Principal Problem:    Sepsis ([address]  Active Problems:    Complicated UTI (urinary tract infection)    Influenza A    Dehydration  Resolved Problems:    * No resolved hospital problems. *      Discharge Diagnoses: Principal Problem:    Sepsis ([address]  Active Problems:    Complicated UTI (urinary tract infection)    Influenza A    Dehydration  Resolved Problems:    * No resolved hospital problems. *    Sepsis  Complicated uti  Influenza A  Keron  Dehydration  Hypokalemia  depression      Consults:  IP CONSULT TO FAMILY MEDICINE  IP CONSULT TO INFECTIOUS DISEASES  IP CONSULT TO PULMONOLOGY  IP CONSULT TO UROLOGY  IP CONSULT TO IV TEAM  IP CONSULT TO PHARMACY    Procedures: none    Hospital Course: Patient was admitted with Pleural effusion [J90]  Sepsis ([address] [A41.9]  Sepsis without acute organ dysfunction, due to unspecified organism ([address] [A41.9]. Patient is a 65-year-old male presents emergency department for nausea, vomiting, and left sided flank pain started this morning. Patient was recently discharged for sepsis secondary to UTI requiring ICU admission at that time. Patient states that he has vomited a few times and has had fevers as well as fast heart rate. Patient was discharged on outpatient antibiotics. He states that he is compliant with all his medications. Patient denies chest pain, abdominal pain, numbness, tingling, or headache. Pt seen and examined by consultants. Pt improved on iv fluids, abx, and tamiflu. On day of discharge, pt denied fevers, chills,n/v.       Discharge Exam:  Vitals:    11/23/22 0742 11/23/22 0930 11/23/22 1343 11/23/22 1552   BP: (!) 136/93   135/88   Pulse: 72 78 68 79   Resp: 18 18 18 18   Temp: 98.6 °F (37 °C)   98.3 °F (36.8 °C)   TempSrc: Oral   Oral   SpO2:  96% 99%    Weight:       Height:           General Appearance: in no acute distress and alert  Skin: no rash or erythema and no diaphoresis, jaundice noted  Head: normocephalic and atraumatic  Eyes: extraocular eye movements intact and sclera anicteric  ENT: mucous membranes moist  Pulmonary/Chest: respiratory rate 16, no dyspnea, pt able to speak in complete sentences  Extremities: no edema  I/O last 3 completed shifts:  In: -   Out: 400 [Urine:400]  No intake/output data recorded. LABS:  Recent Labs     11/21/22  1050 11/22/22  0318 11/23/22  0400    136 133   K 3.4* 3.8 3.8   CL 98 100 98   CO2 26 27 26   BUN 25* 19 15   CREATININE 1.4* 1.4* 1.2   GLUCOSE 114* 96 84   CALCIUM 8.1* 8.1* 8.4*       Recent Labs     11/20/22  1716 11/22/22  0318 11/23/22  0400   WBC 12.0* 12.1* 8.5   RBC 3.75* 3.36* 3.25*   HGB 11.1* 10.0* 9.5*   HCT 33.5* 31.0* 29.0*   MCV 89.3 92.3 89.2   MCH 29.6 29.8 29.2   MCHC 33.1 32.3 32.8   RDW 14.2 14.9 14.7    343 364   MPV 9.1 9.9 9.7       No results for input(s): POCGLU in the last 72 hours.     CBC with Differential:    Lab Results   Component Value Date/Time    WBC 8.5 11/23/2022 04:00 AM    RBC 3.25 11/23/2022 04:00 AM    HGB 9.5 11/23/2022 04:00 AM    HCT 29.0 11/23/2022 04:00 AM     11/23/2022 04:00 AM    MCV 89.2 11/23/2022 04:00 AM    MCH 29.2 11/23/2022 04:00 AM    MCHC 32.8 11/23/2022 04:00 AM    RDW 14.7 11/23/2022 04:00 AM    NRBC 0.0 11/16/2022 11:54 AM    SEGSPCT 77 09/18/2013 08:40 PM    METASPCT 2.0 11/20/2022 05:16 PM    LYMPHOPCT 12.1 11/23/2022 04:00 AM    MONOPCT 8.8 11/23/2022 04:00 AM    BASOPCT 0.2 11/23/2022 04:00 AM    MONOSABS 0.75 11/23/2022 04:00 AM    LYMPHSABS 1.03 11/23/2022 04:00 AM    EOSABS 0.13 11/23/2022 04:00 AM    BASOSABS 0.02 11/23/2022 04:00 AM     CMP:    Lab Results   Component Value Date/Time     11/23/2022 04:00 AM    K 3.8 11/23/2022 04:00 AM    K 3.4 11/21/2022 10:50 AM    CL 98 11/23/2022 04:00 AM    CO2 26 11/23/2022 04:00 AM    BUN 15 11/23/2022 04:00 AM    CREATININE 1.2 11/23/2022 04:00 AM    GFRAA >60 06/13/2021 03:00 AM    LABGLOM >60 11/23/2022 04:00 AM    GLUCOSE 84 11/23/2022 04:00 AM    PROT 5.9 11/23/2022 04:00 AM    LABALBU 2.7 11/23/2022 04:00 AM    CALCIUM 8.4 11/23/2022 04:00 AM    BILITOT 0.4 11/23/2022 04:00 AM    ALKPHOS 67 11/23/2022 04:00 AM    AST 66 11/23/2022 04:00 AM    ALT 58 11/23/2022 04:00 AM       Imaging:   XR CHEST PORTABLE   Final Result   No acute process. CT ABDOMEN PELVIS WO CONTRAST Additional Contrast? None   Final Result   1. Stable, satisfactory position of left ureteral stent. 2. Redemonstration of mild left hydronephrosis and left perinephric edema. 3. Nonobstructing right renal calculus. 4. Diverticulosis. 5. New bilateral pleural effusions with new opacities in posterior lower   lobes related to atelectasis or pneumonia. 6. Hypoattenuating lesions associated with both kidneys likely represent   cysts. Ultrasound recommended for further evaluation. XR CHEST PORTABLE   Final Result   1. No acute cardiopulmonary pathology seen. Stable atherosclerotic disease. 2.  Stable rib fracture on the right. Interval removal of right IJ catheter. Patient Instructions:      Medication List        START taking these medications      ertapenem  infusion  Commonly known as: INVanz  Infuse 1,000 mg intravenously every 24 hours for 9 days Compound per protocol.      oseltamivir 75 MG capsule  Commonly known as: TAMIFLU  Take 1 capsule by mouth 2 times daily for 5 doses            CONTINUE taking these medications      amphetamine-dextroamphetamine 20 MG tablet  Commonly known as: ADDERALL     clonazePAM 0.5 MG tablet  Commonly known as: KLONOPIN     DULoxetine 60 MG extended release capsule  Commonly known as: CYMBALTA     famotidine 40 MG tablet  Commonly known as: PEPCID     lactobacillus Caps capsule  Take 1 capsule by mouth daily     ondansetron 4 MG disintegrating tablet  Commonly known as: ZOFRAN-ODT     oxybutynin 10 MG extended release tablet  Commonly known as: DITROPAN-XL     oxyCODONE 5 MG capsule  Take 1 capsule by mouth every 6 hours as needed for Pain for up to 3 days.      Ozempic (1 MG/DOSE) 4 MG/3ML Sopn  Generic drug: Semaglutide (1 MG/DOSE)     QUEtiapine 200 MG tablet  Commonly known as: SEROQUEL     sildenafil 100 MG tablet  Commonly known as: VIAGRA     tamsulosin 0.4 MG capsule  Commonly known as: FLOMAX            STOP taking these medications      cefdinir 300 MG capsule  Commonly known as: OMNICEF     levoFLOXacin 500 MG tablet  Commonly known as: LEVAQUIN               Where to Get Your Medications        These medications were sent to [address] - [address]      Phone: [phone]   oseltamivir 75 MG capsule       You can get these medications from any pharmacy    Bring a paper prescription for each of these medications  ertapenem  infusion  oxyCODONE 5 MG capsule         Total time for discharge is 37 min    Signed:  Electronically signed by Tamanna Vines DO on 11/23/2022 at 4:54 PM

## 2022-11-23 NOTE — PROCEDURES
PICC   Catheter insertion date: 11/23/2022     Product Number:  [id number]   Lot No: [id number]   Gauge: 17   Lumen: 4.5 Yoruba/ SINGLE   R Basilic    Vein Diameter: 0.43CM   Arm circumference at insertion site: 30CM   Catheter Length: 47CM   Internal Length: 45CM   External Catheter Length: 2CM   Ultrasound Used: YES  VPS [facility] [place] confirms PICC tip is placed in the lower 1/3 of the SVC or at the Cavoatrial junction. Floor nurse notified PICC is okay to use.    : Adan Li RN

## 2022-11-23 NOTE — PROGRESS NOTES
[address] [place] [address] [facility]  ROMANDEEJAY  Progress Note    SUBJECTIVE:  Chief Complaint   Patient presents with    Fever     Discharged yesterday from here, hospitalized for sepsis      Patient is tolerating medications. No reported adverse drug reactions. No nausea, vomiting, diarrhea. Up in the bathroom, doing okay  Afebrile     Review of systems:  As stated above in the chief complaint, otherwise negative. Medications:  Scheduled Meds:   ertapenem (INVanz) IVPB  1,000 mg IntraVENous Q24H    oseltamivir  75 mg Oral BID    DULoxetine  60 mg Oral Nightly    lactobacillus  1 capsule Oral Daily    QUEtiapine  200 mg Oral Nightly    [START ON 2022] Semaglutide (1 MG/DOSE)  1 mg SubCUTAneous Weekly    tamsulosin  0.4 mg Oral QAM    sodium chloride flush  5-40 mL IntraVENous 2 times per day    enoxaparin  30 mg SubCUTAneous BID    ipratropium-albuterol  1 ampule Inhalation Q4H WA     Continuous Infusions:   sodium chloride 75 mL/hr at 22    sodium chloride       PRN Meds:benzonatate, HYDROcodone homatropine, amphetamine-dextroamphetamine, clonazePAM, famotidine, oxybutynin, oxyCODONE, sodium chloride flush, sodium chloride, ondansetron **OR** ondansetron, polyethylene glycol, acetaminophen **OR** acetaminophen    OBJECTIVE:  BP (!) 136/93   Pulse 72   Temp 98.6 °F (37 °C) (Oral)   Resp 18   Ht 6' 3\" (1.905 m)   Wt 230 lb (104.3 kg)   SpO2 97%   BMI 28.75 kg/m²   Temp  Av.6 °F (37 °C)  Min: 98.2 °F (36.8 °C)  Max: 99 °F (37.2 °C)  Constitutional: The patient is awake, alert, and oriented. Up in bathroom. Skin: Warm and dry. No rashes were noted. HEENT: Round and reactive pupils. Moist mucous membranes. No ulcerations or thrush. Neck: Supple to movements. Chest: No use of accessory muscles to breathe. Symmetrical expansion. No wheezing, crackles or rhonchi. Cardiovascular: S1 and S2 are rhythmic and regular. No murmurs appreciated.    Abdomen: Positive bowel sounds to auscultation. Benign to palpation. Extremities: No clubbing, no cyanosis, no edema. Lines: peripheral    Laboratory and Tests Review:  Lab Results   Component Value Date    WBC 8.5 11/23/2022    WBC 12.1 (H) 11/22/2022    WBC 12.0 (H) 11/20/2022    HGB 9.5 (L) 11/23/2022    HCT 29.0 (L) 11/23/2022    MCV 89.2 11/23/2022     11/23/2022     Lab Results   Component Value Date    NEUTROABS 6.48 11/23/2022    NEUTROABS 10.18 (H) 11/22/2022    NEUTROABS 11.04 (H) 11/20/2022     No results found for: [facility] [[email or web address]]  Lab Results   Component Value Date    ALT 58 (H) 11/23/2022    AST 66 (H) 11/23/2022    ALKPHOS 67 11/23/2022    BILITOT 0.4 11/23/2022     Lab Results   Component Value Date/Time     11/23/2022 04:00 AM    K 3.8 11/23/2022 04:00 AM    K 3.4 11/21/2022 10:50 AM    CL 98 11/23/2022 04:00 AM    CO2 26 11/23/2022 04:00 AM    BUN 15 11/23/2022 04:00 AM    CREATININE 1.2 11/23/2022 04:00 AM    CREATININE 1.4 11/22/2022 03:18 AM    CREATININE 1.4 11/21/2022 10:50 AM    GFRAA >60 06/13/2021 03:00 AM    LABGLOM >60 11/23/2022 04:00 AM    GLUCOSE 84 11/23/2022 04:00 AM    PROT 5.9 11/23/2022 04:00 AM    LABALBU 2.7 11/23/2022 04:00 AM    CALCIUM 8.4 11/23/2022 04:00 AM    BILITOT 0.4 11/23/2022 04:00 AM    ALKPHOS 67 11/23/2022 04:00 AM    AST 66 11/23/2022 04:00 AM    ALT 58 11/23/2022 04:00 AM     Lab Results   Component Value Date    CRP 13.4 (H) 11/19/2022    CRP 13.4 (H) 11/18/2022    CRP 17.9 (H) 11/17/2022     Lab Results   Component Value Date    SEDRATE 66 (H) 11/19/2022    SEDRATE 63 (H) 11/18/2022    SEDRATE 70 (H) 11/17/2022     Radiology:  Reviewed     Microbiology:   Urine culture 11/16/2022: ESBL + E.coli, Strep species  Blood cultures 11/20/2022: ESBL + E.coli   Blood cultures 11/22/2022: pending  RVP: Influenza A H3  Streptococcus pneumoniae/Legionella urine Ag: negative     Recent Labs     11/21/22  1050   PROCAL 87.25*         ASSESSMENT:  Complicated UTI with sepsis.   Urine cultures from previous admission were ordered but not done until later when we asked microbiology to search with the specimen. These grew ESBL + E. coli and Streptococcus species  Status post cystoscopy with left ureteral stent 11/11/2022 in [place]  ESBL + E. coli septicemia associated to the above  Influenza A infection  Leukocytosis associated to the above  Elevation of procalcitonin secondary to sepsis    PLAN:  Continue Ertapenem for a minimum of 10 days - reconciled   Continue oral Oseltamivir  Repeat blood cultures pending - will order PICC if negative   Check final & repeat cultures  Monitor labs -- procal= 93.02    Elizabeth Adame, APRN - CNP  8:41 AM  11/23/2022     Patient seen and examined. I had a face to face encounter with the patient. Agree with exam.  Assessment and plan as outlined above and directed by me. Addition and corrections were done as deemed appropriate. My exam and plan include: The patient is doing well. Tolerating antibiotics. Feeling better. We will go ahead and order a PICC for IV antibiotic. He will be going to the infusion center. Case discussed with Dr. Nu Moreira. Informed Consent for PICC:  I explained the risks, benefits, alternative options, and potential complications associated with insertion of Peripherally Inserted Central Catheter (PICC) with the patient prior to the procedure.     Electronically signed by Samir Figueredo MD on 11/23/2022 at 2:43 PM     Samir Figueredo MD  11/23/2022  2:42 PM

## 2022-11-24 ENCOUNTER — HOSPITAL ENCOUNTER (OUTPATIENT)
Dept: INFUSION THERAPY | Age: 57
Setting detail: INFUSION SERIES
Discharge: HOME | End: 2022-11-24
Payer: COMMERCIAL

## 2022-11-24 VITALS
WEIGHT: 225 LBS | SYSTOLIC BLOOD PRESSURE: 124 MMHG | HEART RATE: 84 BPM | DIASTOLIC BLOOD PRESSURE: 83 MMHG | BODY MASS INDEX: 27.98 KG/M2 | RESPIRATION RATE: 16 BRPM | OXYGEN SATURATION: 99 % | TEMPERATURE: 97.4 F | HEIGHT: 75 IN

## 2022-11-24 DIAGNOSIS — N39.0 COMPLICATED UTI (URINARY TRACT INFECTION): Primary | ICD-10-CM

## 2022-11-24 LAB — ORGANISM: ABNORMAL

## 2022-11-24 PROCEDURE — A4216 STERILE WATER/SALINE, 10 ML: HCPCS | Performed by: REGISTERED NURSE

## 2022-11-24 PROCEDURE — 2580000003 HC RX 258: Performed by: REGISTERED NURSE

## 2022-11-24 PROCEDURE — 96374 THER/PROPH/DIAG INJ IV PUSH: CPT

## 2022-11-24 PROCEDURE — 6360000002 HC RX W HCPCS: Performed by: REGISTERED NURSE

## 2022-11-24 RX ORDER — SODIUM CHLORIDE 0.9 % (FLUSH) 0.9 %
5-40 SYRINGE (ML) INJECTION PRN
Status: CANCELLED | OUTPATIENT
Start: 2022-11-25

## 2022-11-24 RX ORDER — HEPARIN SODIUM (PORCINE) LOCK FLUSH IV SOLN 100 UNIT/ML 100 UNIT/ML
300 SOLUTION INTRAVENOUS PRN
Status: DISCONTINUED | OUTPATIENT
Start: 2022-11-24 | End: 2022-11-25 | Stop reason: HOSPADM

## 2022-11-24 RX ORDER — SODIUM CHLORIDE 0.9 % (FLUSH) 0.9 %
5-40 SYRINGE (ML) INJECTION PRN
Status: DISCONTINUED | OUTPATIENT
Start: 2022-11-24 | End: 2022-11-25 | Stop reason: HOSPADM

## 2022-11-24 RX ADMIN — Medication 300 UNITS: at 09:13

## 2022-11-24 RX ADMIN — SODIUM CHLORIDE, PRESERVATIVE FREE 10 ML: 5 INJECTION INTRAVENOUS at 09:06

## 2022-11-24 RX ADMIN — SODIUM CHLORIDE, PRESERVATIVE FREE 10 ML: 5 INJECTION INTRAVENOUS at 09:13

## 2022-11-24 RX ADMIN — ERTAPENEM SODIUM 1000 MG: 1 INJECTION, POWDER, LYOPHILIZED, FOR SOLUTION INTRAMUSCULAR; INTRAVENOUS at 09:08

## 2022-11-25 ENCOUNTER — HOSPITAL ENCOUNTER (OUTPATIENT)
Dept: INFUSION THERAPY | Age: 57
Setting detail: INFUSION SERIES
Discharge: HOME | End: 2022-11-25
Payer: COMMERCIAL

## 2022-11-25 VITALS
BODY MASS INDEX: 27.98 KG/M2 | RESPIRATION RATE: 16 BRPM | HEIGHT: 75 IN | SYSTOLIC BLOOD PRESSURE: 109 MMHG | WEIGHT: 225 LBS | DIASTOLIC BLOOD PRESSURE: 70 MMHG | HEART RATE: 90 BPM | OXYGEN SATURATION: 96 % | TEMPERATURE: 97.1 F

## 2022-11-25 DIAGNOSIS — N39.0 COMPLICATED UTI (URINARY TRACT INFECTION): Primary | ICD-10-CM

## 2022-11-25 LAB
ALBUMIN SERPL-MCNC: 2.9 G/DL (ref 3.5–5.2)
ALP BLD-CCNC: 65 U/L (ref 40–129)
ALT SERPL-CCNC: 58 U/L (ref 0–40)
ANION GAP SERPL CALCULATED.3IONS-SCNC: 11 MMOL/L (ref 7–16)
AST SERPL-CCNC: 54 U/L (ref 0–39)
BASOPHILS ABSOLUTE: 0.01 E9/L (ref 0–0.2)
BASOPHILS RELATIVE PERCENT: 0.1 % (ref 0–2)
BILIRUB SERPL-MCNC: 0.6 MG/DL (ref 0–1.2)
BUN BLDV-MCNC: 15 MG/DL (ref 6–20)
C-REACTIVE PROTEIN: 6.1 MG/DL (ref 0–0.4)
CALCIUM SERPL-MCNC: 8.8 MG/DL (ref 8.6–10.2)
CHLORIDE BLD-SCNC: 99 MMOL/L (ref 98–107)
CO2: 26 MMOL/L (ref 22–29)
CREAT SERPL-MCNC: 1.1 MG/DL (ref 0.7–1.2)
EOSINOPHILS ABSOLUTE: 0.09 E9/L (ref 0.05–0.5)
EOSINOPHILS RELATIVE PERCENT: 0.9 % (ref 0–6)
GFR SERPL CREATININE-BSD FRML MDRD: >60 ML/MIN/1.73
GLUCOSE BLD-MCNC: 146 MG/DL (ref 74–99)
HCT VFR BLD CALC: 32.9 % (ref 37–54)
HEMOGLOBIN: 10.7 G/DL (ref 12.5–16.5)
IMMATURE GRANULOCYTES #: 0.11 E9/L
IMMATURE GRANULOCYTES %: 1.1 % (ref 0–5)
LYMPHOCYTES ABSOLUTE: 1.14 E9/L (ref 1.5–4)
LYMPHOCYTES RELATIVE PERCENT: 11.5 % (ref 20–42)
MCH RBC QN AUTO: 29.6 PG (ref 26–35)
MCHC RBC AUTO-ENTMCNC: 32.5 % (ref 32–34.5)
MCV RBC AUTO: 91.1 FL (ref 80–99.9)
MONOCYTES ABSOLUTE: 0.63 E9/L (ref 0.1–0.95)
MONOCYTES RELATIVE PERCENT: 6.4 % (ref 2–12)
NEUTROPHILS ABSOLUTE: 7.93 E9/L (ref 1.8–7.3)
NEUTROPHILS RELATIVE PERCENT: 80 % (ref 43–80)
PDW BLD-RTO: 14.6 FL (ref 11.5–15)
PLATELET # BLD: 405 E9/L (ref 130–450)
PMV BLD AUTO: 9.8 FL (ref 7–12)
POTASSIUM SERPL-SCNC: 4.2 MMOL/L (ref 3.5–5)
RBC # BLD: 3.61 E12/L (ref 3.8–5.8)
SEDIMENTATION RATE, ERYTHROCYTE: 62 MM/HR (ref 0–15)
SODIUM BLD-SCNC: 136 MMOL/L (ref 132–146)
TOTAL PROTEIN: 6.2 G/DL (ref 6.4–8.3)
WBC # BLD: 9.9 E9/L (ref 4.5–11.5)

## 2022-11-25 PROCEDURE — 86140 C-REACTIVE PROTEIN: CPT

## 2022-11-25 PROCEDURE — 85651 RBC SED RATE NONAUTOMATED: CPT

## 2022-11-25 PROCEDURE — 96374 THER/PROPH/DIAG INJ IV PUSH: CPT

## 2022-11-25 PROCEDURE — A4216 STERILE WATER/SALINE, 10 ML: HCPCS | Performed by: REGISTERED NURSE

## 2022-11-25 PROCEDURE — 2580000003 HC RX 258: Performed by: REGISTERED NURSE

## 2022-11-25 PROCEDURE — 36415 COLL VENOUS BLD VENIPUNCTURE: CPT

## 2022-11-25 PROCEDURE — 6360000002 HC RX W HCPCS: Performed by: REGISTERED NURSE

## 2022-11-25 PROCEDURE — 85025 COMPLETE CBC W/AUTO DIFF WBC: CPT

## 2022-11-25 PROCEDURE — 80053 COMPREHEN METABOLIC PANEL: CPT

## 2022-11-25 RX ORDER — HEPARIN SODIUM (PORCINE) LOCK FLUSH IV SOLN 100 UNIT/ML 100 UNIT/ML
300 SOLUTION INTRAVENOUS PRN
Status: DISCONTINUED | OUTPATIENT
Start: 2022-11-25 | End: 2022-11-26 | Stop reason: HOSPADM

## 2022-11-25 RX ORDER — HEPARIN SODIUM (PORCINE) LOCK FLUSH IV SOLN 100 UNIT/ML 100 UNIT/ML
300 SOLUTION INTRAVENOUS PRN
Status: CANCELLED | OUTPATIENT
Start: 2022-11-25

## 2022-11-25 RX ORDER — SODIUM CHLORIDE 0.9 % (FLUSH) 0.9 %
5-40 SYRINGE (ML) INJECTION PRN
Status: CANCELLED | OUTPATIENT
Start: 2022-11-26

## 2022-11-25 RX ORDER — HEPARIN SODIUM (PORCINE) LOCK FLUSH IV SOLN 100 UNIT/ML 100 UNIT/ML
300 SOLUTION INTRAVENOUS PRN
Status: CANCELLED | OUTPATIENT
Start: 2022-11-26

## 2022-11-25 RX ORDER — SODIUM CHLORIDE 0.9 % (FLUSH) 0.9 %
5-40 SYRINGE (ML) INJECTION PRN
Status: DISCONTINUED | OUTPATIENT
Start: 2022-11-25 | End: 2022-11-26 | Stop reason: HOSPADM

## 2022-11-25 RX ADMIN — SODIUM CHLORIDE, PRESERVATIVE FREE 10 ML: 5 INJECTION INTRAVENOUS at 09:34

## 2022-11-25 RX ADMIN — SODIUM CHLORIDE, PRESERVATIVE FREE 10 ML: 5 INJECTION INTRAVENOUS at 09:35

## 2022-11-25 RX ADMIN — ERTAPENEM SODIUM 1000 MG: 1 INJECTION, POWDER, LYOPHILIZED, FOR SOLUTION INTRAMUSCULAR; INTRAVENOUS at 09:29

## 2022-11-25 RX ADMIN — SODIUM CHLORIDE, PRESERVATIVE FREE 10 ML: 5 INJECTION INTRAVENOUS at 09:26

## 2022-11-25 RX ADMIN — SODIUM CHLORIDE, PRESERVATIVE FREE 10 ML: 5 INJECTION INTRAVENOUS at 09:25

## 2022-11-25 RX ADMIN — Medication 300 UNITS: at 09:35

## 2022-11-25 NOTE — PROGRESS NOTES
Patient instructed on s/s infection/complication with PICC line to report and instructed on keeping PICC dressing clean, dry and intact patient verbalizes understanding. Tolerated infusion well. Reviewed therapy plan, offered education material and/or discharge material, reviewed medication information and signs and symptoms  and educated on possible side effects, verbalizes good knowledge of current plan patient verbalizes understanding, and has no signs or symptoms to report at this time. Patient discharged. Patient alert and oriented x3. No distress noted. Vital signs stable. Patient denies any new or worsening pain. Patient denies any needs. All questions answered. Next appointment scheduled.  declines copy of AVS.

## 2022-11-26 ENCOUNTER — HOSPITAL ENCOUNTER (OUTPATIENT)
Dept: INFUSION THERAPY | Age: 57
Setting detail: INFUSION SERIES
Discharge: HOME | End: 2022-11-26
Payer: COMMERCIAL

## 2022-11-26 VITALS
SYSTOLIC BLOOD PRESSURE: 103 MMHG | DIASTOLIC BLOOD PRESSURE: 68 MMHG | OXYGEN SATURATION: 96 % | TEMPERATURE: 97.4 F | HEART RATE: 134 BPM | RESPIRATION RATE: 18 BRPM

## 2022-11-26 DIAGNOSIS — N39.0 COMPLICATED UTI (URINARY TRACT INFECTION): Primary | ICD-10-CM

## 2022-11-26 LAB
BLOOD CULTURE, ROUTINE: ABNORMAL
BLOOD CULTURE, ROUTINE: ABNORMAL
ORGANISM: ABNORMAL

## 2022-11-26 PROCEDURE — A4216 STERILE WATER/SALINE, 10 ML: HCPCS | Performed by: REGISTERED NURSE

## 2022-11-26 PROCEDURE — 2580000003 HC RX 258: Performed by: REGISTERED NURSE

## 2022-11-26 PROCEDURE — 96374 THER/PROPH/DIAG INJ IV PUSH: CPT

## 2022-11-26 PROCEDURE — 6360000002 HC RX W HCPCS: Performed by: REGISTERED NURSE

## 2022-11-26 RX ORDER — HEPARIN SODIUM (PORCINE) LOCK FLUSH IV SOLN 100 UNIT/ML 100 UNIT/ML
300 SOLUTION INTRAVENOUS PRN
Status: DISCONTINUED | OUTPATIENT
Start: 2022-11-26 | End: 2022-11-27 | Stop reason: HOSPADM

## 2022-11-26 RX ORDER — HEPARIN SODIUM (PORCINE) LOCK FLUSH IV SOLN 100 UNIT/ML 100 UNIT/ML
300 SOLUTION INTRAVENOUS PRN
Status: CANCELLED | OUTPATIENT
Start: 2022-11-27

## 2022-11-26 RX ORDER — SODIUM CHLORIDE 0.9 % (FLUSH) 0.9 %
5-40 SYRINGE (ML) INJECTION PRN
Status: DISCONTINUED | OUTPATIENT
Start: 2022-11-26 | End: 2022-11-27 | Stop reason: HOSPADM

## 2022-11-26 RX ORDER — SODIUM CHLORIDE 0.9 % (FLUSH) 0.9 %
5-40 SYRINGE (ML) INJECTION PRN
Status: CANCELLED | OUTPATIENT
Start: 2022-11-27

## 2022-11-26 RX ADMIN — Medication 300 UNITS: at 09:38

## 2022-11-26 RX ADMIN — SODIUM CHLORIDE, PRESERVATIVE FREE 10 ML: 5 INJECTION INTRAVENOUS at 09:46

## 2022-11-26 RX ADMIN — ERTAPENEM SODIUM 1000 MG: 1 INJECTION, POWDER, LYOPHILIZED, FOR SOLUTION INTRAMUSCULAR; INTRAVENOUS at 09:31

## 2022-11-26 RX ADMIN — SODIUM CHLORIDE, PRESERVATIVE FREE 10 ML: 5 INJECTION INTRAVENOUS at 09:30

## 2022-11-26 NOTE — PROGRESS NOTES
Patient instructed on s/s infection/complication with PICC line to report and instructed on keeping PICC dressing clean, dry and intact patient verbalizes understanding. Tolerated infusion well. Reviewed therapy plan, offered education material and/or discharge material, reviewed medication information and signs and symptoms  and educated on possible side effects, verbalizes good knowledge of current plan patient verbalizes understanding, and has no signs or symptoms to report at this time. Patient discharged. Patient alert and oriented x3. No distress noted. Vital signs stable. Patient denies any new or worsening pain. Patient denies any needs. All questions answered. Next appointment scheduled.  Declined copy of AVS.

## 2022-11-27 ENCOUNTER — HOSPITAL ENCOUNTER (OUTPATIENT)
Dept: INFUSION THERAPY | Age: 57
Setting detail: INFUSION SERIES
Discharge: HOME | End: 2022-11-27
Payer: COMMERCIAL

## 2022-11-27 VITALS
DIASTOLIC BLOOD PRESSURE: 73 MMHG | TEMPERATURE: 97 F | SYSTOLIC BLOOD PRESSURE: 123 MMHG | HEART RATE: 103 BPM | OXYGEN SATURATION: 97 % | RESPIRATION RATE: 18 BRPM

## 2022-11-27 DIAGNOSIS — N39.0 COMPLICATED UTI (URINARY TRACT INFECTION): Primary | ICD-10-CM

## 2022-11-27 LAB — BLOOD CULTURE, ROUTINE: NORMAL

## 2022-11-27 PROCEDURE — 6360000002 HC RX W HCPCS: Performed by: REGISTERED NURSE

## 2022-11-27 PROCEDURE — 2580000003 HC RX 258: Performed by: REGISTERED NURSE

## 2022-11-27 PROCEDURE — 96374 THER/PROPH/DIAG INJ IV PUSH: CPT

## 2022-11-27 PROCEDURE — A4216 STERILE WATER/SALINE, 10 ML: HCPCS | Performed by: REGISTERED NURSE

## 2022-11-27 RX ORDER — HEPARIN SODIUM (PORCINE) LOCK FLUSH IV SOLN 100 UNIT/ML 100 UNIT/ML
300 SOLUTION INTRAVENOUS PRN
Status: DISCONTINUED | OUTPATIENT
Start: 2022-11-27 | End: 2022-11-28 | Stop reason: HOSPADM

## 2022-11-27 RX ORDER — SODIUM CHLORIDE 0.9 % (FLUSH) 0.9 %
5-40 SYRINGE (ML) INJECTION PRN
Status: DISCONTINUED | OUTPATIENT
Start: 2022-11-27 | End: 2022-11-28 | Stop reason: HOSPADM

## 2022-11-27 RX ORDER — SODIUM CHLORIDE 0.9 % (FLUSH) 0.9 %
5-40 SYRINGE (ML) INJECTION PRN
Status: CANCELLED | OUTPATIENT
Start: 2022-11-28

## 2022-11-27 RX ORDER — HEPARIN SODIUM (PORCINE) LOCK FLUSH IV SOLN 100 UNIT/ML 100 UNIT/ML
300 SOLUTION INTRAVENOUS PRN
Status: CANCELLED | OUTPATIENT
Start: 2022-11-28

## 2022-11-27 RX ADMIN — SODIUM CHLORIDE, PRESERVATIVE FREE 10 ML: 5 INJECTION INTRAVENOUS at 09:29

## 2022-11-27 RX ADMIN — SODIUM CHLORIDE, PRESERVATIVE FREE 10 ML: 5 INJECTION INTRAVENOUS at 09:36

## 2022-11-27 RX ADMIN — ERTAPENEM SODIUM 1000 MG: 1 INJECTION, POWDER, LYOPHILIZED, FOR SOLUTION INTRAMUSCULAR; INTRAVENOUS at 09:30

## 2022-11-27 RX ADMIN — Medication 300 UNITS: at 09:37

## 2022-11-28 ENCOUNTER — HOSPITAL ENCOUNTER (OUTPATIENT)
Dept: INFUSION THERAPY | Age: 57
Setting detail: INFUSION SERIES
Discharge: HOME | End: 2022-11-28
Payer: COMMERCIAL

## 2022-11-28 VITALS
DIASTOLIC BLOOD PRESSURE: 84 MMHG | HEIGHT: 75 IN | WEIGHT: 225 LBS | OXYGEN SATURATION: 98 % | HEART RATE: 101 BPM | TEMPERATURE: 97.6 F | RESPIRATION RATE: 18 BRPM | SYSTOLIC BLOOD PRESSURE: 127 MMHG | BODY MASS INDEX: 27.98 KG/M2

## 2022-11-28 DIAGNOSIS — N39.0 COMPLICATED UTI (URINARY TRACT INFECTION): Primary | ICD-10-CM

## 2022-11-28 LAB
ALBUMIN SERPL-MCNC: 3.1 G/DL (ref 3.5–5.2)
ALP BLD-CCNC: 57 U/L (ref 40–129)
ALT SERPL-CCNC: 34 U/L (ref 0–40)
ANION GAP SERPL CALCULATED.3IONS-SCNC: 7 MMOL/L (ref 7–16)
AST SERPL-CCNC: 22 U/L (ref 0–39)
BASOPHILS ABSOLUTE: 0.02 E9/L (ref 0–0.2)
BASOPHILS RELATIVE PERCENT: 0.2 % (ref 0–2)
BILIRUB SERPL-MCNC: 0.5 MG/DL (ref 0–1.2)
BUN BLDV-MCNC: 18 MG/DL (ref 6–20)
C-REACTIVE PROTEIN: 3.8 MG/DL (ref 0–0.4)
CALCIUM SERPL-MCNC: 9 MG/DL (ref 8.6–10.2)
CHLORIDE BLD-SCNC: 100 MMOL/L (ref 98–107)
CO2: 26 MMOL/L (ref 22–29)
CREAT SERPL-MCNC: 1.2 MG/DL (ref 0.7–1.2)
EOSINOPHILS ABSOLUTE: 0.06 E9/L (ref 0.05–0.5)
EOSINOPHILS RELATIVE PERCENT: 0.6 % (ref 0–6)
GFR SERPL CREATININE-BSD FRML MDRD: >60 ML/MIN/1.73
GLUCOSE BLD-MCNC: 131 MG/DL (ref 74–99)
HCT VFR BLD CALC: 32 % (ref 37–54)
HEMOGLOBIN: 10.2 G/DL (ref 12.5–16.5)
IMMATURE GRANULOCYTES #: 0.13 E9/L
IMMATURE GRANULOCYTES %: 1.2 % (ref 0–5)
LYMPHOCYTES ABSOLUTE: 1.27 E9/L (ref 1.5–4)
LYMPHOCYTES RELATIVE PERCENT: 11.9 % (ref 20–42)
MCH RBC QN AUTO: 29.1 PG (ref 26–35)
MCHC RBC AUTO-ENTMCNC: 31.9 % (ref 32–34.5)
MCV RBC AUTO: 91.2 FL (ref 80–99.9)
MONOCYTES ABSOLUTE: 0.82 E9/L (ref 0.1–0.95)
MONOCYTES RELATIVE PERCENT: 7.7 % (ref 2–12)
NEUTROPHILS ABSOLUTE: 8.39 E9/L (ref 1.8–7.3)
NEUTROPHILS RELATIVE PERCENT: 78.4 % (ref 43–80)
PDW BLD-RTO: 14.2 FL (ref 11.5–15)
PLATELET # BLD: 400 E9/L (ref 130–450)
PMV BLD AUTO: 9.2 FL (ref 7–12)
POTASSIUM SERPL-SCNC: 3.9 MMOL/L (ref 3.5–5)
RBC # BLD: 3.51 E12/L (ref 3.8–5.8)
SEDIMENTATION RATE, ERYTHROCYTE: 70 MM/HR (ref 0–15)
SODIUM BLD-SCNC: 133 MMOL/L (ref 132–146)
TOTAL PROTEIN: 6.5 G/DL (ref 6.4–8.3)
WBC # BLD: 10.7 E9/L (ref 4.5–11.5)

## 2022-11-28 PROCEDURE — A4216 STERILE WATER/SALINE, 10 ML: HCPCS | Performed by: REGISTERED NURSE

## 2022-11-28 PROCEDURE — 80053 COMPREHEN METABOLIC PANEL: CPT

## 2022-11-28 PROCEDURE — 6360000002 HC RX W HCPCS: Performed by: REGISTERED NURSE

## 2022-11-28 PROCEDURE — 2580000003 HC RX 258: Performed by: REGISTERED NURSE

## 2022-11-28 PROCEDURE — 36415 COLL VENOUS BLD VENIPUNCTURE: CPT

## 2022-11-28 PROCEDURE — 86140 C-REACTIVE PROTEIN: CPT

## 2022-11-28 PROCEDURE — 85651 RBC SED RATE NONAUTOMATED: CPT

## 2022-11-28 PROCEDURE — 96374 THER/PROPH/DIAG INJ IV PUSH: CPT

## 2022-11-28 PROCEDURE — 85025 COMPLETE CBC W/AUTO DIFF WBC: CPT

## 2022-11-28 RX ORDER — HEPARIN SODIUM (PORCINE) LOCK FLUSH IV SOLN 100 UNIT/ML 100 UNIT/ML
300 SOLUTION INTRAVENOUS PRN
Status: DISCONTINUED | OUTPATIENT
Start: 2022-11-28 | End: 2022-11-29 | Stop reason: HOSPADM

## 2022-11-28 RX ORDER — HEPARIN SODIUM (PORCINE) LOCK FLUSH IV SOLN 100 UNIT/ML 100 UNIT/ML
300 SOLUTION INTRAVENOUS PRN
Status: CANCELLED | OUTPATIENT
Start: 2022-11-29

## 2022-11-28 RX ORDER — SODIUM CHLORIDE 0.9 % (FLUSH) 0.9 %
5-40 SYRINGE (ML) INJECTION PRN
Status: DISCONTINUED | OUTPATIENT
Start: 2022-11-28 | End: 2022-11-29 | Stop reason: HOSPADM

## 2022-11-28 RX ORDER — SODIUM CHLORIDE 0.9 % (FLUSH) 0.9 %
5-40 SYRINGE (ML) INJECTION PRN
Status: CANCELLED | OUTPATIENT
Start: 2022-11-29

## 2022-11-28 RX ADMIN — Medication 300 UNITS: at 14:24

## 2022-11-28 RX ADMIN — SODIUM CHLORIDE, PRESERVATIVE FREE 10 ML: 5 INJECTION INTRAVENOUS at 14:23

## 2022-11-28 RX ADMIN — SODIUM CHLORIDE, PRESERVATIVE FREE 10 ML: 5 INJECTION INTRAVENOUS at 14:14

## 2022-11-28 RX ADMIN — ERTAPENEM SODIUM 1000 MG: 1 INJECTION, POWDER, LYOPHILIZED, FOR SOLUTION INTRAMUSCULAR; INTRAVENOUS at 14:18

## 2022-11-28 RX ADMIN — SODIUM CHLORIDE, PRESERVATIVE FREE 10 ML: 5 INJECTION INTRAVENOUS at 14:16

## 2022-11-29 ENCOUNTER — HOSPITAL ENCOUNTER (OUTPATIENT)
Dept: INFUSION THERAPY | Age: 57
Setting detail: INFUSION SERIES
Discharge: HOME | End: 2022-11-29
Payer: COMMERCIAL

## 2022-11-29 VITALS
RESPIRATION RATE: 18 BRPM | WEIGHT: 225 LBS | TEMPERATURE: 98 F | HEIGHT: 75 IN | SYSTOLIC BLOOD PRESSURE: 112 MMHG | BODY MASS INDEX: 27.98 KG/M2 | DIASTOLIC BLOOD PRESSURE: 70 MMHG | OXYGEN SATURATION: 97 % | HEART RATE: 107 BPM

## 2022-11-29 DIAGNOSIS — N39.0 COMPLICATED UTI (URINARY TRACT INFECTION): Primary | ICD-10-CM

## 2022-11-29 LAB
EKG ATRIAL RATE: 129 BPM
EKG P AXIS: 41 DEGREES
EKG P-R INTERVAL: 134 MS
EKG Q-T INTERVAL: 326 MS
EKG QRS DURATION: 74 MS
EKG QTC CALCULATION (BAZETT): 477 MS
EKG R AXIS: 54 DEGREES
EKG T AXIS: 42 DEGREES
EKG VENTRICULAR RATE: 129 BPM

## 2022-11-29 PROCEDURE — 2580000003 HC RX 258: Performed by: REGISTERED NURSE

## 2022-11-29 PROCEDURE — A4216 STERILE WATER/SALINE, 10 ML: HCPCS | Performed by: REGISTERED NURSE

## 2022-11-29 PROCEDURE — 96374 THER/PROPH/DIAG INJ IV PUSH: CPT

## 2022-11-29 PROCEDURE — 6360000002 HC RX W HCPCS: Performed by: REGISTERED NURSE

## 2022-11-29 RX ORDER — HEPARIN SODIUM (PORCINE) LOCK FLUSH IV SOLN 100 UNIT/ML 100 UNIT/ML
300 SOLUTION INTRAVENOUS PRN
Status: DISCONTINUED | OUTPATIENT
Start: 2022-11-29 | End: 2022-11-30 | Stop reason: HOSPADM

## 2022-11-29 RX ORDER — SODIUM CHLORIDE 0.9 % (FLUSH) 0.9 %
5-40 SYRINGE (ML) INJECTION PRN
Status: DISCONTINUED | OUTPATIENT
Start: 2022-11-29 | End: 2022-11-30 | Stop reason: HOSPADM

## 2022-11-29 RX ORDER — HEPARIN SODIUM (PORCINE) LOCK FLUSH IV SOLN 100 UNIT/ML 100 UNIT/ML
300 SOLUTION INTRAVENOUS PRN
Status: CANCELLED | OUTPATIENT
Start: 2022-11-30

## 2022-11-29 RX ORDER — SODIUM CHLORIDE 0.9 % (FLUSH) 0.9 %
5-40 SYRINGE (ML) INJECTION PRN
Status: CANCELLED | OUTPATIENT
Start: 2022-11-30

## 2022-11-29 RX ADMIN — ERTAPENEM SODIUM 1000 MG: 1 INJECTION, POWDER, LYOPHILIZED, FOR SOLUTION INTRAMUSCULAR; INTRAVENOUS at 14:23

## 2022-11-29 RX ADMIN — SODIUM CHLORIDE, PRESERVATIVE FREE 10 ML: 5 INJECTION INTRAVENOUS at 14:28

## 2022-11-29 RX ADMIN — SODIUM CHLORIDE, PRESERVATIVE FREE 10 ML: 5 INJECTION INTRAVENOUS at 14:16

## 2022-11-29 RX ADMIN — Medication 300 UNITS: at 14:29

## 2022-11-30 ENCOUNTER — HOSPITAL ENCOUNTER (OUTPATIENT)
Dept: INFUSION THERAPY | Age: 57
Setting detail: INFUSION SERIES
Discharge: HOME | End: 2022-11-30
Payer: COMMERCIAL

## 2022-11-30 VITALS
SYSTOLIC BLOOD PRESSURE: 101 MMHG | DIASTOLIC BLOOD PRESSURE: 73 MMHG | HEART RATE: 101 BPM | RESPIRATION RATE: 18 BRPM | OXYGEN SATURATION: 97 % | BODY MASS INDEX: 27.98 KG/M2 | WEIGHT: 225 LBS | HEIGHT: 75 IN | TEMPERATURE: 97.8 F

## 2022-11-30 DIAGNOSIS — N39.0 COMPLICATED UTI (URINARY TRACT INFECTION): Primary | ICD-10-CM

## 2022-11-30 PROCEDURE — 6360000002 HC RX W HCPCS: Performed by: REGISTERED NURSE

## 2022-11-30 PROCEDURE — 2580000003 HC RX 258: Performed by: REGISTERED NURSE

## 2022-11-30 PROCEDURE — A4216 STERILE WATER/SALINE, 10 ML: HCPCS | Performed by: REGISTERED NURSE

## 2022-11-30 PROCEDURE — 96374 THER/PROPH/DIAG INJ IV PUSH: CPT

## 2022-11-30 RX ORDER — SODIUM CHLORIDE 0.9 % (FLUSH) 0.9 %
5-40 SYRINGE (ML) INJECTION PRN
Status: DISCONTINUED | OUTPATIENT
Start: 2022-11-30 | End: 2022-12-01 | Stop reason: HOSPADM

## 2022-11-30 RX ORDER — HEPARIN SODIUM (PORCINE) LOCK FLUSH IV SOLN 100 UNIT/ML 100 UNIT/ML
300 SOLUTION INTRAVENOUS PRN
Status: CANCELLED | OUTPATIENT
Start: 2022-12-01

## 2022-11-30 RX ORDER — HEPARIN SODIUM (PORCINE) LOCK FLUSH IV SOLN 100 UNIT/ML 100 UNIT/ML
300 SOLUTION INTRAVENOUS PRN
Status: DISCONTINUED | OUTPATIENT
Start: 2022-11-30 | End: 2022-12-01 | Stop reason: HOSPADM

## 2022-11-30 RX ORDER — SODIUM CHLORIDE 0.9 % (FLUSH) 0.9 %
5-40 SYRINGE (ML) INJECTION PRN
Status: CANCELLED | OUTPATIENT
Start: 2022-12-01

## 2022-11-30 RX ADMIN — Medication 300 UNITS: at 14:25

## 2022-11-30 RX ADMIN — SODIUM CHLORIDE, PRESERVATIVE FREE 10 ML: 5 INJECTION INTRAVENOUS at 14:24

## 2022-11-30 RX ADMIN — ERTAPENEM SODIUM 1000 MG: 1 INJECTION, POWDER, LYOPHILIZED, FOR SOLUTION INTRAMUSCULAR; INTRAVENOUS at 14:19

## 2022-11-30 RX ADMIN — SODIUM CHLORIDE, PRESERVATIVE FREE 10 ML: 5 INJECTION INTRAVENOUS at 14:18

## 2022-12-01 ENCOUNTER — HOSPITAL ENCOUNTER (OUTPATIENT)
Dept: INFUSION THERAPY | Age: 57
Setting detail: INFUSION SERIES
Discharge: HOME | End: 2022-12-01
Payer: COMMERCIAL

## 2022-12-01 VITALS
OXYGEN SATURATION: 98 % | BODY MASS INDEX: 28.12 KG/M2 | DIASTOLIC BLOOD PRESSURE: 88 MMHG | WEIGHT: 225 LBS | SYSTOLIC BLOOD PRESSURE: 119 MMHG | RESPIRATION RATE: 16 BRPM | TEMPERATURE: 97.8 F

## 2022-12-01 DIAGNOSIS — N39.0 COMPLICATED UTI (URINARY TRACT INFECTION): Primary | ICD-10-CM

## 2022-12-01 LAB
ALBUMIN SERPL-MCNC: 3.1 G/DL (ref 3.5–5.2)
ALP BLD-CCNC: 57 U/L (ref 40–129)
ALT SERPL-CCNC: 23 U/L (ref 0–40)
ANION GAP SERPL CALCULATED.3IONS-SCNC: 8 MMOL/L (ref 7–16)
AST SERPL-CCNC: 18 U/L (ref 0–39)
BASOPHILS ABSOLUTE: 0.04 E9/L (ref 0–0.2)
BASOPHILS RELATIVE PERCENT: 0.4 % (ref 0–2)
BILIRUB SERPL-MCNC: 0.3 MG/DL (ref 0–1.2)
BUN BLDV-MCNC: 26 MG/DL (ref 6–20)
CALCIUM SERPL-MCNC: 9.2 MG/DL (ref 8.6–10.2)
CHLORIDE BLD-SCNC: 102 MMOL/L (ref 98–107)
CO2: 26 MMOL/L (ref 22–29)
CREAT SERPL-MCNC: 1.2 MG/DL (ref 0.7–1.2)
EOSINOPHILS ABSOLUTE: 0.06 E9/L (ref 0.05–0.5)
EOSINOPHILS RELATIVE PERCENT: 0.6 % (ref 0–6)
GFR SERPL CREATININE-BSD FRML MDRD: >60 ML/MIN/1.73
GLUCOSE BLD-MCNC: 119 MG/DL (ref 74–99)
HCT VFR BLD CALC: 31.4 % (ref 37–54)
HEMOGLOBIN: 10.1 G/DL (ref 12.5–16.5)
IMMATURE GRANULOCYTES #: 0.08 E9/L
IMMATURE GRANULOCYTES %: 0.8 % (ref 0–5)
LYMPHOCYTES ABSOLUTE: 1.25 E9/L (ref 1.5–4)
LYMPHOCYTES RELATIVE PERCENT: 12.5 % (ref 20–42)
MCH RBC QN AUTO: 29 PG (ref 26–35)
MCHC RBC AUTO-ENTMCNC: 32.2 % (ref 32–34.5)
MCV RBC AUTO: 90.2 FL (ref 80–99.9)
MONOCYTES ABSOLUTE: 0.89 E9/L (ref 0.1–0.95)
MONOCYTES RELATIVE PERCENT: 8.9 % (ref 2–12)
NEUTROPHILS ABSOLUTE: 7.7 E9/L (ref 1.8–7.3)
NEUTROPHILS RELATIVE PERCENT: 76.8 % (ref 43–80)
PDW BLD-RTO: 13.7 FL (ref 11.5–15)
PLATELET # BLD: 343 E9/L (ref 130–450)
PMV BLD AUTO: 9 FL (ref 7–12)
POTASSIUM SERPL-SCNC: 4 MMOL/L (ref 3.5–5)
RBC # BLD: 3.48 E12/L (ref 3.8–5.8)
SODIUM BLD-SCNC: 136 MMOL/L (ref 132–146)
TOTAL PROTEIN: 6.6 G/DL (ref 6.4–8.3)
WBC # BLD: 10 E9/L (ref 4.5–11.5)

## 2022-12-01 PROCEDURE — A4216 STERILE WATER/SALINE, 10 ML: HCPCS | Performed by: REGISTERED NURSE

## 2022-12-01 PROCEDURE — 2580000003 HC RX 258: Performed by: REGISTERED NURSE

## 2022-12-01 PROCEDURE — 85025 COMPLETE CBC W/AUTO DIFF WBC: CPT

## 2022-12-01 PROCEDURE — 80053 COMPREHEN METABOLIC PANEL: CPT

## 2022-12-01 PROCEDURE — 36415 COLL VENOUS BLD VENIPUNCTURE: CPT

## 2022-12-01 PROCEDURE — 96374 THER/PROPH/DIAG INJ IV PUSH: CPT

## 2022-12-01 PROCEDURE — 6360000002 HC RX W HCPCS: Performed by: REGISTERED NURSE

## 2022-12-01 RX ORDER — HEPARIN SODIUM (PORCINE) LOCK FLUSH IV SOLN 100 UNIT/ML 100 UNIT/ML
300 SOLUTION INTRAVENOUS PRN
OUTPATIENT
Start: 2022-12-02

## 2022-12-01 RX ORDER — SODIUM CHLORIDE 0.9 % (FLUSH) 0.9 %
5-40 SYRINGE (ML) INJECTION PRN
Status: DISCONTINUED | OUTPATIENT
Start: 2022-12-01 | End: 2022-12-02 | Stop reason: HOSPADM

## 2022-12-01 RX ORDER — HEPARIN SODIUM (PORCINE) LOCK FLUSH IV SOLN 100 UNIT/ML 100 UNIT/ML
300 SOLUTION INTRAVENOUS PRN
Status: DISCONTINUED | OUTPATIENT
Start: 2022-12-01 | End: 2022-12-02 | Stop reason: HOSPADM

## 2022-12-01 RX ORDER — SODIUM CHLORIDE 0.9 % (FLUSH) 0.9 %
5-40 SYRINGE (ML) INJECTION PRN
OUTPATIENT
Start: 2022-12-02

## 2022-12-01 RX ADMIN — ERTAPENEM SODIUM 1000 MG: 1 INJECTION, POWDER, LYOPHILIZED, FOR SOLUTION INTRAMUSCULAR; INTRAVENOUS at 14:10

## 2022-12-01 RX ADMIN — SODIUM CHLORIDE, PRESERVATIVE FREE 10 ML: 5 INJECTION INTRAVENOUS at 14:15

## 2022-12-01 RX ADMIN — SODIUM CHLORIDE, PRESERVATIVE FREE 10 ML: 5 INJECTION INTRAVENOUS at 14:06

## 2022-12-01 RX ADMIN — Medication 300 UNITS: at 14:14

## 2022-12-01 ASSESSMENT — PAIN DESCRIPTION - FREQUENCY: FREQUENCY: CONTINUOUS

## 2022-12-01 ASSESSMENT — PAIN DESCRIPTION - PAIN TYPE: TYPE: ACUTE PAIN

## 2022-12-01 ASSESSMENT — PAIN DESCRIPTION - ONSET: ONSET: ON-GOING

## 2022-12-01 NOTE — PROGRESS NOTES
Picc line removed  48 cm intact removed   pressure applied   4x4 and tape applied dressing  intact   no active bleeding

## 2022-12-01 NOTE — PROGRESS NOTES
Tolerated infusion well. Reviewed therapy plan, offered education material and/or discharge material, reviewed medication information and signs and symptoms  and educated on possible side effects, verbalizes good knowledge of current plan patient verbalizes understanding, and has no signs or symptoms to report at this time. Patient discharged. Patient alert and oriented x3. No distress noted. Vital signs stable. Patient denies any new or worsening pain. Patient denies any needs. All questions answered. Next appointment scheduled. Declines copy of AVS. Patient instructed on s/s infection/complication with midline to report and instructed on keeping midline dressing clean, dry and intact patient verbalizes understanding.

## 2022-12-02 ENCOUNTER — HOSPITAL ENCOUNTER (OUTPATIENT)
Dept: INFUSION THERAPY | Age: 57
Setting detail: INFUSION SERIES
Discharge: HOME | End: 2022-12-02

## 2022-12-08 ENCOUNTER — HOSPITAL ENCOUNTER (OUTPATIENT)
Age: 57
Discharge: HOME | End: 2022-12-08
Payer: MEDICARE

## 2022-12-08 DIAGNOSIS — N39.0 COMPLICATED UTI (URINARY TRACT INFECTION): ICD-10-CM

## 2022-12-08 PROCEDURE — 87088 URINE BACTERIA CULTURE: CPT

## 2022-12-10 LAB — URINE CULTURE, ROUTINE: NORMAL

## 2022-12-21 PROBLEM — J10.1 INFLUENZA A: Status: RESOLVED | Noted: 2022-11-21 | Resolved: 2022-12-21

## 2022-12-21 PROBLEM — E86.0 DEHYDRATION: Status: RESOLVED | Noted: 2022-11-21 | Resolved: 2022-12-21

## 2023-01-01 NOTE — PROGRESS NOTES
Physician Progress Note      Bridgette Scruggs  CSN #:                  [id number]  :                       1965  ADMIT DATE:       2022 4:45 PM  [address] DATE:        2022 6:05 PM  RESPONDING  PROVIDER #:        Rhae Boas MD          QUERY TEXT:    Dr. Marco A Valera,    Patient admitted with UTI with sepsis. Noted documentation of acute   respiratory failure in the  H&P. In order to support the diagnosis of   acute respiratory failure, please include additional clinical indicators in   your documentation. Or please document if the diagnosis of acute respiratory   failure has been ruled out after further study. The medical record reflects the following:  Risk Factors: Sepsis  Clinical Indicators: RR 16-20, SpO2 89% on room air, the H&P states \"Acute   hypoxic respiratory failure\", per the  Progress note \"no respiratory   distress\" the H&P states \"Acute hypoxic respiratory failure\"  Treatment: Supplemental O2, nebulizer treatments    Thank you,  Kylah Millan RN  Clinical Documentation Improvement  [email or web address]    Acute Respiratory Failure Clinical Indicators per  MS-DRG Training Guide and   Quick Reference Guide:  pO2 < 60 mmHg or SpO2 (pulse oximetry) < 91% breathing room air  pCO2 > 50 and pH < 7.35  P/F ratio (pO2 / FIO2) < 300  pO2 decrease or pCO2 increase by 10 mmHg from baseline (if known)  Supplemental oxygen of 40% or more  Presence of respiratory distress, tachypnea, dyspnea, shortness of breath,   wheezing  Unable to speak in complete sentences  Use of accessory muscles to breathe  Extreme anxiety and feeling of impending doom  Tripod position  Confusion/altered mental status/obtunded  Options provided:  -- Acute Respiratory Failure as evidenced by, Please document evidence.   -- Acute Respiratory Failure ruled out after study  -- Other - I will add my own diagnosis  -- Disagree - Not applicable / Not valid  -- Disagree - Clinically unable to determine / Unknown  -- Refer to Clinical Documentation Reviewer    PROVIDER RESPONSE TEXT:    This patient is in acute respiratory failure as evidenced by pO2 < 60 mmHg or   SpO2 (pulse oximetry) < 91% breathing room air    Query created by: Amaury Burch on 12/6/2022 3:00 PM      Electronically signed by:   Ji Gaxiola MD 12/31/2022 7:13 PM

## 2024-04-30 ENCOUNTER — APPOINTMENT (OUTPATIENT)
Dept: CT IMAGING | Age: 59
End: 2024-04-30
Payer: MEDICARE

## 2024-04-30 ENCOUNTER — APPOINTMENT (OUTPATIENT)
Dept: ULTRASOUND IMAGING | Age: 59
End: 2024-04-30
Payer: MEDICARE

## 2024-04-30 ENCOUNTER — APPOINTMENT (OUTPATIENT)
Dept: GENERAL RADIOLOGY | Age: 59
End: 2024-04-30
Payer: MEDICARE

## 2024-04-30 ENCOUNTER — HOSPITAL ENCOUNTER (INPATIENT)
Age: 59
LOS: 2 days | Discharge: HOME | End: 2024-05-02
Attending: EMERGENCY MEDICINE | Admitting: STUDENT IN AN ORGANIZED HEALTH CARE EDUCATION/TRAINING PROGRAM
Payer: MEDICARE

## 2024-04-30 DIAGNOSIS — R11.2 NAUSEA AND VOMITING, UNSPECIFIED VOMITING TYPE: ICD-10-CM

## 2024-04-30 DIAGNOSIS — K52.9 COLITIS: ICD-10-CM

## 2024-04-30 DIAGNOSIS — E87.20 LACTIC ACIDOSIS: ICD-10-CM

## 2024-04-30 DIAGNOSIS — R55 SYNCOPE AND COLLAPSE: Primary | ICD-10-CM

## 2024-04-30 DIAGNOSIS — A41.9 SEPTICEMIA (HCC): ICD-10-CM

## 2024-04-30 DIAGNOSIS — N20.0 NEPHROLITHIASIS: ICD-10-CM

## 2024-04-30 LAB
ALBUMIN SERPL-MCNC: 4 G/DL (ref 3.5–5.2)
ALP SERPL-CCNC: 58 U/L (ref 40–129)
ALT SERPL-CCNC: 14 U/L (ref 0–40)
ANION GAP SERPL CALCULATED.3IONS-SCNC: 12 MMOL/L (ref 7–16)
AST SERPL-CCNC: 17 U/L (ref 0–39)
BACTERIA URNS QL MICRO: ABNORMAL
BASOPHILS # BLD: 0.03 K/UL (ref 0–0.2)
BASOPHILS NFR BLD: 0 % (ref 0–2)
BILIRUB SERPL-MCNC: 0.4 MG/DL (ref 0–1.2)
BILIRUB UR QL STRIP: NEGATIVE
BUN SERPL-MCNC: 38 MG/DL (ref 6–23)
CALCIUM SERPL-MCNC: 9 MG/DL (ref 8.6–10.2)
CHLORIDE SERPL-SCNC: 105 MMOL/L (ref 98–107)
CLARITY UR: CLEAR
CO2 SERPL-SCNC: 24 MMOL/L (ref 22–29)
COLOR UR: YELLOW
CORTIS SERPL-MCNC: 22.8 UG/DL (ref 2.7–18.4)
CREAT SERPL-MCNC: 1.7 MG/DL (ref 0.7–1.2)
EKG ATRIAL RATE: 67 BPM
EKG P AXIS: 60 DEGREES
EKG P-R INTERVAL: 156 MS
EKG Q-T INTERVAL: 434 MS
EKG QRS DURATION: 82 MS
EKG QTC CALCULATION (BAZETT): 458 MS
EKG R AXIS: 72 DEGREES
EKG T AXIS: 70 DEGREES
EKG VENTRICULAR RATE: 67 BPM
EOSINOPHIL # BLD: 0.2 K/UL (ref 0.05–0.5)
EOSINOPHILS RELATIVE PERCENT: 2 % (ref 0–6)
ERYTHROCYTE [DISTWIDTH] IN BLOOD BY AUTOMATED COUNT: 13.3 % (ref 11.5–15)
GFR SERPL CREATININE-BSD FRML MDRD: 44 ML/MIN/1.73M2
GLUCOSE BLD-MCNC: 134 MG/DL (ref 74–99)
GLUCOSE BLD-MCNC: 135 MG/DL (ref 74–99)
GLUCOSE BLD-MCNC: 160 MG/DL (ref 74–99)
GLUCOSE SERPL-MCNC: 139 MG/DL (ref 74–99)
GLUCOSE UR STRIP-MCNC: NEGATIVE MG/DL
HCT VFR BLD AUTO: 43.3 % (ref 37–54)
HGB BLD-MCNC: 14.2 G/DL (ref 12.5–16.5)
HGB UR QL STRIP.AUTO: NEGATIVE
IMM GRANULOCYTES # BLD AUTO: 0.04 K/UL (ref 0–0.58)
IMM GRANULOCYTES NFR BLD: 1 % (ref 0–5)
KETONES UR STRIP-MCNC: NEGATIVE MG/DL
LACTATE BLDV-SCNC: 2.3 MMOL/L (ref 0.5–1.9)
LACTATE BLDV-SCNC: 2.7 MMOL/L (ref 0.5–1.9)
LACTATE BLDV-SCNC: 2.9 MMOL/L (ref 0.5–2.2)
LACTATE BLDV-SCNC: 3.4 MMOL/L (ref 0.5–1.9)
LEUKOCYTE ESTERASE UR QL STRIP: ABNORMAL
LYMPHOCYTES NFR BLD: 3.25 K/UL (ref 1.5–4)
LYMPHOCYTES RELATIVE PERCENT: 39 % (ref 20–42)
MAGNESIUM SERPL-MCNC: 2.3 MG/DL (ref 1.6–2.6)
MCH RBC QN AUTO: 31 PG (ref 26–35)
MCHC RBC AUTO-ENTMCNC: 32.8 G/DL (ref 32–34.5)
MCV RBC AUTO: 94.5 FL (ref 80–99.9)
MONOCYTES NFR BLD: 0.95 K/UL (ref 0.1–0.95)
MONOCYTES NFR BLD: 11 % (ref 2–12)
NEUTROPHILS NFR BLD: 46 % (ref 43–80)
NEUTS SEG NFR BLD: 3.85 K/UL (ref 1.8–7.3)
NITRITE UR QL STRIP: NEGATIVE
PH UR STRIP: 6 [PH] (ref 5–9)
PLATELET # BLD AUTO: 226 K/UL (ref 130–450)
PMV BLD AUTO: 8.9 FL (ref 7–12)
POTASSIUM SERPL-SCNC: 3.2 MMOL/L (ref 3.5–5)
PROCALCITONIN SERPL-MCNC: 0.2 NG/ML (ref 0–0.08)
PROT SERPL-MCNC: 6.5 G/DL (ref 6.4–8.3)
PROT UR STRIP-MCNC: NEGATIVE MG/DL
RBC # BLD AUTO: 4.58 M/UL (ref 3.8–5.8)
RBC #/AREA URNS HPF: ABNORMAL /HPF
SODIUM SERPL-SCNC: 141 MMOL/L (ref 132–146)
SP GR UR STRIP: 1.01 (ref 1–1.03)
T4 FREE SERPL-MCNC: 1.1 NG/DL (ref 0.9–1.7)
TROPONIN I SERPL HS-MCNC: 11 NG/L (ref 0–11)
TSH SERPL DL<=0.05 MIU/L-ACNC: 5.73 UIU/ML (ref 0.27–4.2)
UROBILINOGEN UR STRIP-ACNC: 0.2 EU/DL (ref 0–1)
WBC #/AREA URNS HPF: ABNORMAL /HPF
WBC OTHER # BLD: 8.3 K/UL (ref 4.5–11.5)

## 2024-04-30 PROCEDURE — 83735 ASSAY OF MAGNESIUM: CPT

## 2024-04-30 PROCEDURE — 87449 NOS EACH ORGANISM AG IA: CPT

## 2024-04-30 PROCEDURE — 87427 SHIGA-LIKE TOXIN AG IA: CPT

## 2024-04-30 PROCEDURE — 83605 ASSAY OF LACTIC ACID: CPT

## 2024-04-30 PROCEDURE — 6360000002 HC RX W HCPCS

## 2024-04-30 PROCEDURE — 85025 COMPLETE CBC W/AUTO DIFF WBC: CPT

## 2024-04-30 PROCEDURE — 84145 PROCALCITONIN (PCT): CPT

## 2024-04-30 PROCEDURE — 82962 GLUCOSE BLOOD TEST: CPT

## 2024-04-30 PROCEDURE — 96361 HYDRATE IV INFUSION ADD-ON: CPT

## 2024-04-30 PROCEDURE — 6360000004 HC RX CONTRAST MEDICATION: Performed by: RADIOLOGY

## 2024-04-30 PROCEDURE — 87040 BLOOD CULTURE FOR BACTERIA: CPT

## 2024-04-30 PROCEDURE — 87324 CLOSTRIDIUM AG IA: CPT

## 2024-04-30 PROCEDURE — 99223 1ST HOSP IP/OBS HIGH 75: CPT | Performed by: STUDENT IN AN ORGANIZED HEALTH CARE EDUCATION/TRAINING PROGRAM

## 2024-04-30 PROCEDURE — 6360000002 HC RX W HCPCS: Performed by: INTERNAL MEDICINE

## 2024-04-30 PROCEDURE — 2580000003 HC RX 258: Performed by: EMERGENCY MEDICINE

## 2024-04-30 PROCEDURE — 93005 ELECTROCARDIOGRAM TRACING: CPT | Performed by: STUDENT IN AN ORGANIZED HEALTH CARE EDUCATION/TRAINING PROGRAM

## 2024-04-30 PROCEDURE — 74177 CT ABD & PELVIS W/CONTRAST: CPT

## 2024-04-30 PROCEDURE — 2580000003 HC RX 258: Performed by: STUDENT IN AN ORGANIZED HEALTH CARE EDUCATION/TRAINING PROGRAM

## 2024-04-30 PROCEDURE — 93010 ELECTROCARDIOGRAM REPORT: CPT | Performed by: INTERNAL MEDICINE

## 2024-04-30 PROCEDURE — 76770 US EXAM ABDO BACK WALL COMP: CPT

## 2024-04-30 PROCEDURE — 81001 URINALYSIS AUTO W/SCOPE: CPT

## 2024-04-30 PROCEDURE — 96365 THER/PROPH/DIAG IV INF INIT: CPT

## 2024-04-30 PROCEDURE — 2580000003 HC RX 258: Performed by: INTERNAL MEDICINE

## 2024-04-30 PROCEDURE — 6370000000 HC RX 637 (ALT 250 FOR IP): Performed by: STUDENT IN AN ORGANIZED HEALTH CARE EDUCATION/TRAINING PROGRAM

## 2024-04-30 PROCEDURE — 1200000000 HC SEMI PRIVATE

## 2024-04-30 PROCEDURE — 84439 ASSAY OF FREE THYROXINE: CPT

## 2024-04-30 PROCEDURE — 84443 ASSAY THYROID STIM HORMONE: CPT

## 2024-04-30 PROCEDURE — 84484 ASSAY OF TROPONIN QUANT: CPT

## 2024-04-30 PROCEDURE — 87046 STOOL CULTR AEROBIC BACT EA: CPT

## 2024-04-30 PROCEDURE — 82533 TOTAL CORTISOL: CPT

## 2024-04-30 PROCEDURE — 87045 FECES CULTURE AEROBIC BACT: CPT

## 2024-04-30 PROCEDURE — 80053 COMPREHEN METABOLIC PANEL: CPT

## 2024-04-30 PROCEDURE — 6370000000 HC RX 637 (ALT 250 FOR IP): Performed by: INTERNAL MEDICINE

## 2024-04-30 PROCEDURE — 87086 URINE CULTURE/COLONY COUNT: CPT

## 2024-04-30 PROCEDURE — 71045 X-RAY EXAM CHEST 1 VIEW: CPT

## 2024-04-30 PROCEDURE — 6360000002 HC RX W HCPCS: Performed by: STUDENT IN AN ORGANIZED HEALTH CARE EDUCATION/TRAINING PROGRAM

## 2024-04-30 PROCEDURE — 99285 EMERGENCY DEPT VISIT HI MDM: CPT

## 2024-04-30 PROCEDURE — 96375 TX/PRO/DX INJ NEW DRUG ADDON: CPT

## 2024-04-30 RX ORDER — ONDANSETRON 4 MG/1
4 TABLET, ORALLY DISINTEGRATING ORAL EVERY 8 HOURS PRN
Status: DISCONTINUED | OUTPATIENT
Start: 2024-04-30 | End: 2024-05-02 | Stop reason: HOSPADM

## 2024-04-30 RX ORDER — OXYBUTYNIN CHLORIDE 10 MG/1
10 TABLET, EXTENDED RELEASE ORAL DAILY PRN
Status: DISCONTINUED | OUTPATIENT
Start: 2024-04-30 | End: 2024-05-02 | Stop reason: HOSPADM

## 2024-04-30 RX ORDER — DULOXETIN HYDROCHLORIDE 60 MG/1
120 CAPSULE, DELAYED RELEASE ORAL NIGHTLY
Status: DISCONTINUED | OUTPATIENT
Start: 2024-04-30 | End: 2024-05-02 | Stop reason: HOSPADM

## 2024-04-30 RX ORDER — INSULIN LISPRO 100 [IU]/ML
0-8 INJECTION, SOLUTION INTRAVENOUS; SUBCUTANEOUS
Status: DISCONTINUED | OUTPATIENT
Start: 2024-04-30 | End: 2024-05-02 | Stop reason: HOSPADM

## 2024-04-30 RX ORDER — SODIUM CHLORIDE 0.9 % (FLUSH) 0.9 %
5-40 SYRINGE (ML) INJECTION EVERY 12 HOURS SCHEDULED
Status: DISCONTINUED | OUTPATIENT
Start: 2024-04-30 | End: 2024-05-02 | Stop reason: HOSPADM

## 2024-04-30 RX ORDER — PANTOPRAZOLE SODIUM 40 MG/1
40 TABLET, DELAYED RELEASE ORAL DAILY PRN
Status: DISCONTINUED | OUTPATIENT
Start: 2024-04-30 | End: 2024-05-02 | Stop reason: HOSPADM

## 2024-04-30 RX ORDER — MAGNESIUM SULFATE IN WATER 40 MG/ML
2000 INJECTION, SOLUTION INTRAVENOUS PRN
Status: DISCONTINUED | OUTPATIENT
Start: 2024-04-30 | End: 2024-05-02 | Stop reason: HOSPADM

## 2024-04-30 RX ORDER — ACETAMINOPHEN 650 MG/1
650 SUPPOSITORY RECTAL EVERY 6 HOURS PRN
Status: DISCONTINUED | OUTPATIENT
Start: 2024-04-30 | End: 2024-05-02 | Stop reason: HOSPADM

## 2024-04-30 RX ORDER — OXYCODONE HYDROCHLORIDE AND ACETAMINOPHEN 5; 325 MG/1; MG/1
1 TABLET ORAL EVERY 6 HOURS PRN
Status: DISCONTINUED | OUTPATIENT
Start: 2024-04-30 | End: 2024-05-02 | Stop reason: HOSPADM

## 2024-04-30 RX ORDER — POLYETHYLENE GLYCOL 3350 17 G/17G
17 POWDER, FOR SOLUTION ORAL DAILY PRN
Status: DISCONTINUED | OUTPATIENT
Start: 2024-04-30 | End: 2024-05-02 | Stop reason: HOSPADM

## 2024-04-30 RX ORDER — DEXTROSE MONOHYDRATE 100 MG/ML
INJECTION, SOLUTION INTRAVENOUS CONTINUOUS PRN
Status: DISCONTINUED | OUTPATIENT
Start: 2024-04-30 | End: 2024-05-02 | Stop reason: HOSPADM

## 2024-04-30 RX ORDER — PANTOPRAZOLE SODIUM 40 MG/1
40 TABLET, DELAYED RELEASE ORAL DAILY PRN
COMMUNITY
Start: 2023-04-07

## 2024-04-30 RX ORDER — QUETIAPINE FUMARATE 100 MG/1
200 TABLET, FILM COATED ORAL NIGHTLY
Status: DISCONTINUED | OUTPATIENT
Start: 2024-04-30 | End: 2024-05-02 | Stop reason: HOSPADM

## 2024-04-30 RX ORDER — ONDANSETRON 2 MG/ML
4 INJECTION INTRAMUSCULAR; INTRAVENOUS EVERY 6 HOURS PRN
Status: DISCONTINUED | OUTPATIENT
Start: 2024-04-30 | End: 2024-05-02 | Stop reason: HOSPADM

## 2024-04-30 RX ORDER — POTASSIUM CHLORIDE 20 MEQ/1
40 TABLET, EXTENDED RELEASE ORAL ONCE
Status: COMPLETED | OUTPATIENT
Start: 2024-04-30 | End: 2024-04-30

## 2024-04-30 RX ORDER — POTASSIUM CHLORIDE 20 MEQ/1
40 TABLET, EXTENDED RELEASE ORAL PRN
Status: DISCONTINUED | OUTPATIENT
Start: 2024-04-30 | End: 2024-05-02 | Stop reason: HOSPADM

## 2024-04-30 RX ORDER — SODIUM CHLORIDE 0.9 % (FLUSH) 0.9 %
5-40 SYRINGE (ML) INJECTION PRN
Status: DISCONTINUED | OUTPATIENT
Start: 2024-04-30 | End: 2024-05-02 | Stop reason: HOSPADM

## 2024-04-30 RX ORDER — ACETAMINOPHEN 325 MG/1
650 TABLET ORAL EVERY 6 HOURS PRN
Status: DISCONTINUED | OUTPATIENT
Start: 2024-04-30 | End: 2024-05-02 | Stop reason: HOSPADM

## 2024-04-30 RX ORDER — SODIUM CHLORIDE 9 MG/ML
INJECTION, SOLUTION INTRAVENOUS PRN
Status: DISCONTINUED | OUTPATIENT
Start: 2024-04-30 | End: 2024-05-02 | Stop reason: HOSPADM

## 2024-04-30 RX ORDER — ENOXAPARIN SODIUM 100 MG/ML
30 INJECTION SUBCUTANEOUS 2 TIMES DAILY
Status: DISCONTINUED | OUTPATIENT
Start: 2024-04-30 | End: 2024-05-02 | Stop reason: HOSPADM

## 2024-04-30 RX ORDER — 0.9 % SODIUM CHLORIDE 0.9 %
1000 INTRAVENOUS SOLUTION INTRAVENOUS ONCE
Status: COMPLETED | OUTPATIENT
Start: 2024-04-30 | End: 2024-04-30

## 2024-04-30 RX ORDER — 0.9 % SODIUM CHLORIDE 0.9 %
30 INTRAVENOUS SOLUTION INTRAVENOUS ONCE
Status: COMPLETED | OUTPATIENT
Start: 2024-04-30 | End: 2024-04-30

## 2024-04-30 RX ORDER — METRONIDAZOLE 500 MG/100ML
500 INJECTION, SOLUTION INTRAVENOUS EVERY 8 HOURS
Status: DISCONTINUED | OUTPATIENT
Start: 2024-04-30 | End: 2024-05-01

## 2024-04-30 RX ORDER — LISINOPRIL 40 MG/1
40 TABLET ORAL DAILY
COMMUNITY
Start: 2021-10-11

## 2024-04-30 RX ORDER — PROCHLORPERAZINE EDISYLATE 5 MG/ML
10 INJECTION INTRAMUSCULAR; INTRAVENOUS EVERY 6 HOURS PRN
Status: DISCONTINUED | OUTPATIENT
Start: 2024-04-30 | End: 2024-05-02 | Stop reason: HOSPADM

## 2024-04-30 RX ORDER — GLUCAGON 1 MG/ML
1 KIT INJECTION PRN
Status: DISCONTINUED | OUTPATIENT
Start: 2024-04-30 | End: 2024-05-02 | Stop reason: HOSPADM

## 2024-04-30 RX ORDER — INSULIN LISPRO 100 [IU]/ML
0-4 INJECTION, SOLUTION INTRAVENOUS; SUBCUTANEOUS NIGHTLY
Status: DISCONTINUED | OUTPATIENT
Start: 2024-04-30 | End: 2024-05-02 | Stop reason: HOSPADM

## 2024-04-30 RX ORDER — CLONAZEPAM 0.5 MG/1
0.5 TABLET ORAL 2 TIMES DAILY PRN
Status: DISCONTINUED | OUTPATIENT
Start: 2024-04-30 | End: 2024-05-02 | Stop reason: HOSPADM

## 2024-04-30 RX ORDER — SODIUM CHLORIDE 9 MG/ML
INJECTION, SOLUTION INTRAVENOUS CONTINUOUS
Status: DISCONTINUED | OUTPATIENT
Start: 2024-04-30 | End: 2024-05-02

## 2024-04-30 RX ORDER — DEXTROAMPHETAMINE SACCHARATE, AMPHETAMINE ASPARTATE, DEXTROAMPHETAMINE SULFATE AND AMPHETAMINE SULFATE 5; 5; 5; 5 MG/1; MG/1; MG/1; MG/1
20 TABLET ORAL 3 TIMES DAILY PRN
Status: DISCONTINUED | OUTPATIENT
Start: 2024-04-30 | End: 2024-05-02 | Stop reason: HOSPADM

## 2024-04-30 RX ORDER — POTASSIUM CHLORIDE 7.45 MG/ML
10 INJECTION INTRAVENOUS PRN
Status: DISCONTINUED | OUTPATIENT
Start: 2024-04-30 | End: 2024-05-02 | Stop reason: HOSPADM

## 2024-04-30 RX ORDER — FAMOTIDINE 20 MG/1
40 TABLET, FILM COATED ORAL DAILY PRN
Status: DISCONTINUED | OUTPATIENT
Start: 2024-04-30 | End: 2024-05-02 | Stop reason: HOSPADM

## 2024-04-30 RX ORDER — TRIAMTERENE AND HYDROCHLOROTHIAZIDE 37.5; 25 MG/1; MG/1
1 TABLET ORAL DAILY
Status: DISCONTINUED | OUTPATIENT
Start: 2024-04-30 | End: 2024-05-02 | Stop reason: HOSPADM

## 2024-04-30 RX ORDER — TRIAMTERENE AND HYDROCHLOROTHIAZIDE 37.5; 25 MG/1; MG/1
1 CAPSULE ORAL DAILY
COMMUNITY
Start: 2023-12-08 | End: 2024-12-07

## 2024-04-30 RX ORDER — LISINOPRIL 20 MG/1
40 TABLET ORAL DAILY
Status: DISCONTINUED | OUTPATIENT
Start: 2024-04-30 | End: 2024-05-02 | Stop reason: HOSPADM

## 2024-04-30 RX ADMIN — ONDANSETRON 4 MG: 2 INJECTION INTRAMUSCULAR; INTRAVENOUS at 15:48

## 2024-04-30 RX ADMIN — OXYCODONE HYDROCHLORIDE AND ACETAMINOPHEN 1 TABLET: 5; 325 TABLET ORAL at 22:58

## 2024-04-30 RX ADMIN — METRONIDAZOLE 500 MG: 500 INJECTION, SOLUTION INTRAVENOUS at 15:33

## 2024-04-30 RX ADMIN — METRONIDAZOLE 500 MG: 500 INJECTION, SOLUTION INTRAVENOUS at 21:21

## 2024-04-30 RX ADMIN — IOPAMIDOL 75 ML: 755 INJECTION, SOLUTION INTRAVENOUS at 11:34

## 2024-04-30 RX ADMIN — SODIUM CHLORIDE 3402 ML: 9 INJECTION, SOLUTION INTRAVENOUS at 08:13

## 2024-04-30 RX ADMIN — QUETIAPINE FUMARATE 200 MG: 100 TABLET ORAL at 21:12

## 2024-04-30 RX ADMIN — WATER 100 MG: 1 INJECTION INTRAMUSCULAR; INTRAVENOUS; SUBCUTANEOUS at 10:10

## 2024-04-30 RX ADMIN — PIPERACILLIN AND TAZOBACTAM 4500 MG: 4; .5 INJECTION, POWDER, LYOPHILIZED, FOR SOLUTION INTRAVENOUS at 08:17

## 2024-04-30 RX ADMIN — SODIUM CHLORIDE 1000 ML: 9 INJECTION, SOLUTION INTRAVENOUS at 10:09

## 2024-04-30 RX ADMIN — ONDANSETRON 4 MG: 2 INJECTION INTRAMUSCULAR; INTRAVENOUS at 21:33

## 2024-04-30 RX ADMIN — ENOXAPARIN SODIUM 30 MG: 100 INJECTION SUBCUTANEOUS at 21:13

## 2024-04-30 RX ADMIN — OXYBUTYNIN CHLORIDE 10 MG: 10 TABLET, EXTENDED RELEASE ORAL at 15:31

## 2024-04-30 RX ADMIN — SODIUM CHLORIDE, PRESERVATIVE FREE 10 ML: 5 INJECTION INTRAVENOUS at 21:13

## 2024-04-30 RX ADMIN — POTASSIUM CHLORIDE 40 MEQ: 1500 TABLET, EXTENDED RELEASE ORAL at 14:05

## 2024-04-30 RX ADMIN — PROCHLORPERAZINE EDISYLATE 10 MG: 5 INJECTION INTRAMUSCULAR; INTRAVENOUS at 17:24

## 2024-04-30 RX ADMIN — ENOXAPARIN SODIUM 30 MG: 100 INJECTION SUBCUTANEOUS at 15:31

## 2024-04-30 RX ADMIN — DULOXETINE HYDROCHLORIDE 120 MG: 60 CAPSULE, DELAYED RELEASE ORAL at 21:12

## 2024-04-30 RX ADMIN — SODIUM CHLORIDE: 9 INJECTION, SOLUTION INTRAVENOUS at 15:05

## 2024-04-30 RX ADMIN — OXYCODONE HYDROCHLORIDE AND ACETAMINOPHEN 1 TABLET: 5; 325 TABLET ORAL at 16:46

## 2024-04-30 ASSESSMENT — PAIN DESCRIPTION - DESCRIPTORS
DESCRIPTORS: CRAMPING
DESCRIPTORS: CRAMPING

## 2024-04-30 ASSESSMENT — ENCOUNTER SYMPTOMS
ALLERGIC/IMMUNOLOGIC NEGATIVE: 1
DIARRHEA: 1
SHORTNESS OF BREATH: 0
ABDOMINAL DISTENTION: 0
BLOOD IN STOOL: 0
COUGH: 0
VOMITING: 0
RECTAL PAIN: 0
NAUSEA: 1
ABDOMINAL PAIN: 1
CONSTIPATION: 0

## 2024-04-30 ASSESSMENT — PAIN DESCRIPTION - PAIN TYPE: TYPE: ACUTE PAIN

## 2024-04-30 ASSESSMENT — PAIN DESCRIPTION - ORIENTATION
ORIENTATION: MID;LOWER
ORIENTATION: LOWER

## 2024-04-30 ASSESSMENT — PAIN - FUNCTIONAL ASSESSMENT
PAIN_FUNCTIONAL_ASSESSMENT: ACTIVITIES ARE NOT PREVENTED
PAIN_FUNCTIONAL_ASSESSMENT: 0-10

## 2024-04-30 ASSESSMENT — PAIN SCALES - GENERAL
PAINLEVEL_OUTOF10: 7
PAINLEVEL_OUTOF10: 2
PAINLEVEL_OUTOF10: 7
PAINLEVEL_OUTOF10: 7

## 2024-04-30 ASSESSMENT — PAIN DESCRIPTION - LOCATION
LOCATION: ABDOMEN

## 2024-04-30 ASSESSMENT — LIFESTYLE VARIABLES
HOW MANY STANDARD DRINKS CONTAINING ALCOHOL DO YOU HAVE ON A TYPICAL DAY: PATIENT DOES NOT DRINK
HOW OFTEN DO YOU HAVE A DRINK CONTAINING ALCOHOL: NEVER

## 2024-04-30 NOTE — ED NOTES
500 of NSS infused by EMS PTA. They also gave 4mg of zofran IVP and 10 mcg of push-dose epinephrine due to hypotension.

## 2024-04-30 NOTE — H&P
[facility] Hospitalist Group History and Physical    CHIEF COMPLAINT:  LOC, hypotension, emesis    History of Present Illness:      This is a 59 year old male with past medical history of anxiety, depression, type 2 DM, obesity who presents to ER with multiple brief episodes of loss of consciousness, hypotension, bradycardia, emesis. He was given 10 mcg Epi and 4 mg Zofran by EMS. Reportedly, patient thought he was constipated due to abdominal cramping and had a syncopal episode in the bathroom. BP 86/53 on arrival to ER. Lab workup: K 3.2, Creatinine 1.7, LA 3.4 < 2.7 < 2.3, PCT 0.20, TSH 5.73, Cortisol 22.8, UA moderate leukocyte esterase. CXR showing cardiomegaly without edema. CT imaging shows nonspecific colitis, colonic diverticulosis, right nephrolithiasis, prostatomegaly. He was given Solu-Cortef, Zosyn, 4.4 mL bolus in ER. Decision to admit.     Informant(s) for H&P: patient     REVIEW OF SYSTEMS:  A comprehensive review of systems was negative except for: what is in the HPI    PMH:  Past Medical History:   Diagnosis Date    Anxiety     Depression     Diabetes mellitus (HCC)     Fibromyalgia     Left retinal detachment     Metabolic syndrome     Obesity        Surgical History:  Past Surgical History:   Procedure Laterality Date    CHOLECYSTECTOMY      PICC INSERTION VASCULAR ACCESS TEAM  11/23/2022    RETINAL DETACHMENT SURGERY Left 8/28/2020    LEFT EYE PARS PLANA VITRECTOMY 25 GAUGE RETINAL DETACHMENT REPAIR LASER AND GAS FLUID EXCHANGE (SF6) performed by Antonio Hernandez MD at [facility] OR    UMBILICAL HERNIA REPAIR  2015    VASECTOMY  2004       Medications Prior to Admission:    Prior to Admission medications    Medication Sig Start Date End Date Taking? Authorizing Provider   lisinopril (PRINIVIL;ZESTRIL) 40 MG tablet Take 1 tablet by mouth daily 10/11/21  Yes Provider, MD [place]   triamterene-hydroCHLOROthiazide (DYAZIDE) 37.5-25 MG per capsule Take 1 capsule by mouth daily 12/8/23 12/7/24 Yes  Creatinine 1.7, US retroperitoneal pending. Continue IVF. Monitor labs.   Hypokalemia - K 3.2, replaced. Monitor labs in AM.   Type 2 DM - hold home medications. Started on medium dose sliding scale, hypoglycemia protocol, A1C pending. Carb controlled diet.   HTN - hold antihypertensives due to TOSHIA. Monitor BP and adjust accordingly.   Mood disorder - continue cymbalta and seroquel.   ADD - Ok to bring Addraheem from home.   ED - last took Viagra yesterday. Hold.     Code Status: full  DVT prophylaxis: Lovenox    30 minutes time spent reviewing patient chart, assessing patient, discussing plan of care with patient and family, discussing plan of care with collaborating physician, and charting.    Electronically signed by ADALBERTO Alvarez NP on 4/30/2024 at 1:48 PM

## 2024-04-30 NOTE — ED NOTES
ED to Inpatient Handoff Report    Notified FRANCIS Johnson that electronic handoff available and patient ready for transport to room 531.    Safety Risks: None identified    Patient in Restraints: no    Constant Observer or Patient : no    Telemetry Monitoring Ordered :Yes           Order to transfer to unit without monitor:NO      Deterioration Index Score:   Predictive Model Details          19 (Normal)  Factor Value    Calculated 4/30/2024 14:08 62% Age 59 years old    Deterioration Index Model 19% Systolic 157     7% Potassium abnormal (3.2 mmol/L)     6% BUN abnormal (38 mg/dL)     4% Sodium 141 mmol/L     1% WBC count 8.3 k/uL     1% Pulse oximetry 98 %     0% Pulse 76     0% Temperature 98.1 °F (36.7 °C)     0% Respiratory rate 16     0% Hematocrit 43.3 %        Vitals:    04/30/24 1135 04/30/24 1220 04/30/24 1306 04/30/24 1359   BP: 108/67 138/85 (!) 133/91 (!) 157/97   Pulse: 67 67 77 76   Resp:  16  16   Temp:    98.1 °F (36.7 °C)   TempSrc:    Oral   SpO2:  97% 98% 98%   Weight:       Height:             Opportunity for questions and clarification was provided.

## 2024-04-30 NOTE — CONSULTS
GENERAL SURGERY  CONSULT NOTE  4/30/2024    Physician Consulted: Dr. Santiago  Reason for Consult: Colitis  Referring Physician: Dr. Janelle NARANJO  Aleksey Echevarria is a 59 y.o. male who presents to the general surgery service for evaluation of colitis. Patient had some abdominal cramps, went to bathroom and then passed out. He was found to have low bp and ruhsed to ED because of that. He was found to have mild colitis on CTAP and that's why general surgery was consulted. Patient has ah istory of urosepsis due to obstructed stone. He was recently being treated for a UTI with keflex. Patient says that he has some mild abdominal pain. He endorses nausea, but no more vomiting. He said he had 3 bouts of diarrhea since coming in the hospital. He denies any blood in his stool. He did say that he is starting to have hematuria. Otherwise, says he is hungry and willing to try some food. Patient vitals have been stable since admission.   BMP unremarkable other than for lactic acidosis taht has cleared. Hepatic functional panel unremarakble. CBC unremarkable. CTAP showed mild colitis in descending colon.    Medical history is significant for anxiety, depression, DM. Abdominal surgical history is significant for UHR and cholecystectomy . He is not currently taking any blodo thinning medicatios. His las cscope was 7-8 yrs ago andw as normal.     He  reports that he has never smoked. He has never used smokeless tobacco. He reports that he does not drink alcohol and does not use drugs.        Past Medical History:   Diagnosis Date    Anxiety     Depression     Diabetes mellitus (HCC)     Fibromyalgia     Left retinal detachment     Metabolic syndrome     Obesity        Past Surgical History:   Procedure Laterality Date    CHOLECYSTECTOMY      PICC INSERTION VASCULAR ACCESS TEAM  11/23/2022    RETINAL DETACHMENT SURGERY Left 8/28/2020    LEFT EYE PARS PLANA VITRECTOMY 25 GAUGE RETINAL DETACHMENT REPAIR LASER AND GAS FLUID EXCHANGE (SF6)  performed by Antonio Hernandez MD at [facility] OR    UMBILICAL HERNIA REPAIR  2015    VASECTOMY  2004       Medications Prior to Admission:    Prior to Admission medications    Medication Sig Start Date End Date Taking? Authorizing Provider   lisinopril (PRINIVIL;ZESTRIL) 40 MG tablet Take 1 tablet by mouth daily 10/11/21  Yes Alexandria Bal MD   triamterene-hydroCHLOROthiazide (DYAZIDE) 37.5-25 MG per capsule Take 1 capsule by mouth daily 12/8/23 12/7/24 Yes Alexandria Bal MD   pantoprazole (PROTONIX) 40 MG tablet Take 1 tablet by mouth daily as needed (EPIGASTRIC DISCOMFORT) 4/7/23  Yes Alexandria Bal MD   amphetamine-dextroamphetamine (ADDERALL) 20 MG tablet Take 1 tablet by mouth 3 times daily as needed (TO IMPROVE FOCUS).    Alexandria Bal MD   ondansetron (ZOFRAN-ODT) 4 MG disintegrating tablet Take 1 tablet by mouth every 8 hours as needed for Nausea or Vomiting    Alexandria Bal MD   oxybutynin (DITROPAN-XL) 10 MG extended release tablet Take 1 tablet by mouth daily as needed (BLADDER SPASMS)    Alexandria Bal MD   Semaglutide, 1 MG/DOSE, (OZEMPIC, 1 MG/DOSE,) 4 MG/3ML SOPN Inject 0.5 mg into the skin once a week **SATURDAYS**    Alexandria Bal MD   sildenafil (VIAGRA) 100 MG tablet Take 1 tablet by mouth as needed for Erectile Dysfunction    Alexandria Bal MD   clonazePAM (KLONOPIN) 0.5 MG tablet Take 1 tablet by mouth 2 times daily as needed for Anxiety.    Alexandria Bal MD   famotidine (PEPCID) 40 MG tablet Take 1 tablet by mouth daily as needed (HEARTBURN)    Alexandria Bal MD   DULoxetine (CYMBALTA) 60 MG extended release capsule Take 2 capsules by mouth nightly    Alexandria Bal MD   QUEtiapine (SEROQUEL) 200 MG tablet Take 1 tablet by mouth nightly    Alexandria Bal MD       Allergies   Allergen Reactions    Statins Myalgia       Family History   Family history unknown: Yes             Review of Systems   Constitutional:

## 2024-04-30 NOTE — PROGRESS NOTES
SPIRITUAL HEALTH SERVICES - [facility] Encounter    Name: Aleksey Echevarria                  Referral: Routine Visit    Sacraments  Anointed (Last Rites): Yes  Apostolic [place]: No  Confession: No  Communion: No     Assessment:  Patient receptive to  visit.      Intervention:   provided spiritual support and sacramental ministry for patient.     Outcome:  Patient expressed gratitude for visit.    Plan:   will place patient on [facility] [employer] list.      Electronically signed by Chaplain Kin, on 4/30/2024 at 6:19 PM.  Spiritual Care Department  [place]  [phone]

## 2024-04-30 NOTE — ED PROVIDER NOTES
[facility] MED SURG/TELE  EMERGENCY DEPARTMENT ENCOUNTER        Pt Name: Aleksey Echevarria  MRN: [id number]  Birthdate 1965  Date of evaluation: 4/30/2024  Provider: Bryson Mack DO  PCP: Jesus Manuel Sanon MD  Note Started: 7:53 AM EDT 4/30/24    CHIEF COMPLAINT       Chief Complaint   Patient presents with    Loss of Consciousness     Multiple brief episodes of LOC for EMS, given 10 mcg push dose epi by EMS (for hptn and bradycardia) patient states he took viagra last night but has been on it for awhile    hypotension    Emesis     Multiple episodes last night, Given 4 mg zofran by ems       HISTORY OF PRESENT ILLNESS: 1 or more Elements   History From: Patient and EMS    Limitations to history : None    Aleksey Echevarria is a 59 y.o. male who presents to the emergency department by EMS for complaint of syncope and hypotension with emesis.  Patient reportedly went to the bathroom due to some abdominal cramping and thought he was constipated.  He had a syncopal episode in the bathroom.  Per EMS, patient had multiple episodes of syncope and was given push dose epinephrine for bradycardia and hypotension.  Patient states he took Viagra yesterday.  Did not take any of his blood pressure medications today or any other medications today.  He had multiple episodes of nonbloody emesis.  He was given Zofran by EMS prior to arrival.  He reports ongoing abdominal cramping but denies any chest pain, shortness of breath.    Nursing Notes were all reviewed and agreed with or any disagreements were addressed in the HPI.      REVIEW OF EXTERNAL NOTE :       Previous Hospital encounter 11/20/2022 reviewed.  Patient was admitted for sepsis with complicated UTI and influenza A.    REVIEW OF SYSTEMS :           Positives and Pertinent negatives as per HPI.     SURGICAL HISTORY     Past Surgical History:   Procedure Laterality Date    CHOLECYSTECTOMY      PICC INSERTION VASCULAR ACCESS TEAM  11/23/2022    RETINAL DETACHMENT

## 2024-04-30 NOTE — PROGRESS NOTES
4 Eyes Skin Assessment     NAME:  Aleksey Echevarria  YOB: 1965  MEDICAL RECORD NUMBER:  [id number]    The patient is being assessed for  Admission    I agree that at least one RN has performed a thorough Head to Toe Skin Assessment on the patient. ALL assessment sites listed below have been assessed.      Areas assessed by both nurses:    Head, Face, Ears, Shoulders, Back, Chest, Arms, Elbows, Hands, Sacrum. Buttock, Coccyx, Ischium, and Legs. Feet and Heels        Does the Patient have a Wound? No noted wound(s)       Jelani Prevention initiated by RN: No  Wound Care Orders initiated by RN: No    Pressure Injury (Stage 3,4, Unstageable, DTI, NWPT, and Complex wounds) if present, place Wound referral order by RN under : No    New Ostomies, if present place, Ostomy referral order under : No     Nurse 1 eSignature: Electronically signed by JESSICA Hernández RN on 4/30/24 at 3:39 PM EDT    **SHARE this note so that the co-signing nurse can place an eSignature**    Nurse 2 eSignature:     Electronically signed by bob lopez RN on 4/30/24 at 3:40 PM EDT

## 2024-04-30 NOTE — PROGRESS NOTES
Aleksey Echevarria was ordered amphetamine-dextroamphetamine (Adderall) which is a nonformulary medication. Nurse is going to check with patient to see if home supply of this medication will be brought in to the hospital for inpatient use.  A pharmacist will follow-up with the nurse of the patient to assess the capability of the patient to bring in the medication.  If it is determined that the patient cannot supply the medication and it is not available to be dispensed from the pharmacy, a call will be placed to the ordering provider to discuss alternative options.     Alfredo Booker, PharmD  04/30/24 1:51 PM

## 2024-04-30 NOTE — PROGRESS NOTES
Database initiated. Patient is A&O independent from home with wife. States he uses no assistive devices and is RA at baseline. He is having diarrhea and he did fall today when he passed out.

## 2024-05-01 LAB
ALBUMIN SERPL-MCNC: 3.5 G/DL (ref 3.5–5.2)
ALP SERPL-CCNC: 51 U/L (ref 40–129)
ALT SERPL-CCNC: 12 U/L (ref 0–40)
ANION GAP SERPL CALCULATED.3IONS-SCNC: 9 MMOL/L (ref 7–16)
AST SERPL-CCNC: 14 U/L (ref 0–39)
BASOPHILS # BLD: 0.02 K/UL (ref 0–0.2)
BASOPHILS NFR BLD: 0 % (ref 0–2)
BILIRUB SERPL-MCNC: 0.4 MG/DL (ref 0–1.2)
BUN SERPL-MCNC: 22 MG/DL (ref 6–23)
CALCIUM SERPL-MCNC: 8.2 MG/DL (ref 8.6–10.2)
CHLORIDE SERPL-SCNC: 110 MMOL/L (ref 98–107)
CO2 SERPL-SCNC: 21 MMOL/L (ref 22–29)
CREAT SERPL-MCNC: 1.1 MG/DL (ref 0.7–1.2)
CRP SERPL HS-MCNC: 32 MG/L (ref 0–5)
EOSINOPHIL # BLD: 0.01 K/UL (ref 0.05–0.5)
EOSINOPHILS RELATIVE PERCENT: 0 % (ref 0–6)
ERYTHROCYTE [DISTWIDTH] IN BLOOD BY AUTOMATED COUNT: 13.2 % (ref 11.5–15)
ERYTHROCYTE [SEDIMENTATION RATE] IN BLOOD BY WESTERGREN METHOD: 3 MM/HR (ref 0–15)
GFR, ESTIMATED: 74 ML/MIN/1.73M2
GLUCOSE BLD-MCNC: 102 MG/DL (ref 74–99)
GLUCOSE BLD-MCNC: 105 MG/DL (ref 74–99)
GLUCOSE BLD-MCNC: 108 MG/DL (ref 74–99)
GLUCOSE BLD-MCNC: 89 MG/DL (ref 74–99)
GLUCOSE SERPL-MCNC: 118 MG/DL (ref 74–99)
HBA1C MFR BLD: 5.5 % (ref 4–5.6)
HCT VFR BLD AUTO: 41.3 % (ref 37–54)
HGB BLD-MCNC: 13.6 G/DL (ref 12.5–16.5)
IMM GRANULOCYTES # BLD AUTO: 0.08 K/UL (ref 0–0.58)
IMM GRANULOCYTES NFR BLD: 1 % (ref 0–5)
LACTATE BLDV-SCNC: 1.7 MMOL/L (ref 0.5–2.2)
LYMPHOCYTES NFR BLD: 1.38 K/UL (ref 1.5–4)
LYMPHOCYTES RELATIVE PERCENT: 8 % (ref 20–42)
MAGNESIUM SERPL-MCNC: 1.7 MG/DL (ref 1.6–2.6)
MCH RBC QN AUTO: 31 PG (ref 26–35)
MCHC RBC AUTO-ENTMCNC: 32.9 G/DL (ref 32–34.5)
MCV RBC AUTO: 94.1 FL (ref 80–99.9)
MONOCYTES NFR BLD: 1.46 K/UL (ref 0.1–0.95)
MONOCYTES NFR BLD: 9 % (ref 2–12)
NEUTROPHILS NFR BLD: 82 % (ref 43–80)
NEUTS SEG NFR BLD: 13.4 K/UL (ref 1.8–7.3)
PLATELET # BLD AUTO: 211 K/UL (ref 130–450)
PMV BLD AUTO: 8.9 FL (ref 7–12)
POTASSIUM SERPL-SCNC: 3.1 MMOL/L (ref 3.5–5)
PROT SERPL-MCNC: 5.9 G/DL (ref 6.4–8.3)
RBC # BLD AUTO: 4.39 M/UL (ref 3.8–5.8)
SODIUM SERPL-SCNC: 140 MMOL/L (ref 132–146)
WBC OTHER # BLD: 16.4 K/UL (ref 4.5–11.5)

## 2024-05-01 PROCEDURE — 2580000003 HC RX 258: Performed by: INTERNAL MEDICINE

## 2024-05-01 PROCEDURE — 86140 C-REACTIVE PROTEIN: CPT

## 2024-05-01 PROCEDURE — 83036 HEMOGLOBIN GLYCOSYLATED A1C: CPT

## 2024-05-01 PROCEDURE — 85652 RBC SED RATE AUTOMATED: CPT

## 2024-05-01 PROCEDURE — 2580000003 HC RX 258: Performed by: REGISTERED NURSE

## 2024-05-01 PROCEDURE — 6360000002 HC RX W HCPCS

## 2024-05-01 PROCEDURE — 85025 COMPLETE CBC W/AUTO DIFF WBC: CPT

## 2024-05-01 PROCEDURE — 6360000002 HC RX W HCPCS: Performed by: INTERNAL MEDICINE

## 2024-05-01 PROCEDURE — 82962 GLUCOSE BLOOD TEST: CPT

## 2024-05-01 PROCEDURE — 80053 COMPREHEN METABOLIC PANEL: CPT

## 2024-05-01 PROCEDURE — 36415 COLL VENOUS BLD VENIPUNCTURE: CPT

## 2024-05-01 PROCEDURE — 6360000002 HC RX W HCPCS: Performed by: REGISTERED NURSE

## 2024-05-01 PROCEDURE — 83735 ASSAY OF MAGNESIUM: CPT

## 2024-05-01 PROCEDURE — 6370000000 HC RX 637 (ALT 250 FOR IP): Performed by: INTERNAL MEDICINE

## 2024-05-01 PROCEDURE — 99233 SBSQ HOSP IP/OBS HIGH 50: CPT | Performed by: STUDENT IN AN ORGANIZED HEALTH CARE EDUCATION/TRAINING PROGRAM

## 2024-05-01 PROCEDURE — 83605 ASSAY OF LACTIC ACID: CPT

## 2024-05-01 PROCEDURE — 1200000000 HC SEMI PRIVATE

## 2024-05-01 RX ADMIN — OXYBUTYNIN CHLORIDE 10 MG: 10 TABLET, EXTENDED RELEASE ORAL at 18:41

## 2024-05-01 RX ADMIN — SODIUM CHLORIDE: 9 INJECTION, SOLUTION INTRAVENOUS at 02:51

## 2024-05-01 RX ADMIN — OXYCODONE HYDROCHLORIDE AND ACETAMINOPHEN 1 TABLET: 5; 325 TABLET ORAL at 15:21

## 2024-05-01 RX ADMIN — AMPICILLIN SODIUM AND SULBACTAM SODIUM 3000 MG: 2; 1 INJECTION, POWDER, FOR SOLUTION INTRAMUSCULAR; INTRAVENOUS at 12:40

## 2024-05-01 RX ADMIN — ENOXAPARIN SODIUM 30 MG: 100 INJECTION SUBCUTANEOUS at 07:58

## 2024-05-01 RX ADMIN — PROCHLORPERAZINE EDISYLATE 10 MG: 5 INJECTION INTRAMUSCULAR; INTRAVENOUS at 21:50

## 2024-05-01 RX ADMIN — PROCHLORPERAZINE EDISYLATE 10 MG: 5 INJECTION INTRAMUSCULAR; INTRAVENOUS at 07:57

## 2024-05-01 RX ADMIN — METRONIDAZOLE 500 MG: 500 INJECTION, SOLUTION INTRAVENOUS at 05:49

## 2024-05-01 RX ADMIN — OXYCODONE HYDROCHLORIDE AND ACETAMINOPHEN 1 TABLET: 5; 325 TABLET ORAL at 07:57

## 2024-05-01 RX ADMIN — QUETIAPINE FUMARATE 200 MG: 100 TABLET ORAL at 20:56

## 2024-05-01 RX ADMIN — ENOXAPARIN SODIUM 30 MG: 100 INJECTION SUBCUTANEOUS at 20:56

## 2024-05-01 RX ADMIN — SODIUM CHLORIDE, PRESERVATIVE FREE 10 ML: 5 INJECTION INTRAVENOUS at 20:57

## 2024-05-01 RX ADMIN — DULOXETINE HYDROCHLORIDE 120 MG: 60 CAPSULE, DELAYED RELEASE ORAL at 20:55

## 2024-05-01 RX ADMIN — OXYCODONE HYDROCHLORIDE AND ACETAMINOPHEN 1 TABLET: 5; 325 TABLET ORAL at 21:50

## 2024-05-01 RX ADMIN — AMPICILLIN SODIUM AND SULBACTAM SODIUM 3000 MG: 2; 1 INJECTION, POWDER, FOR SOLUTION INTRAMUSCULAR; INTRAVENOUS at 18:11

## 2024-05-01 ASSESSMENT — PAIN DESCRIPTION - LOCATION
LOCATION: ABDOMEN

## 2024-05-01 ASSESSMENT — PAIN DESCRIPTION - DESCRIPTORS
DESCRIPTORS: CRAMPING

## 2024-05-01 ASSESSMENT — PAIN SCALES - GENERAL
PAINLEVEL_OUTOF10: 7
PAINLEVEL_OUTOF10: 2
PAINLEVEL_OUTOF10: 7
PAINLEVEL_OUTOF10: 7

## 2024-05-01 ASSESSMENT — PAIN DESCRIPTION - ORIENTATION
ORIENTATION: MID
ORIENTATION: MID

## 2024-05-01 ASSESSMENT — PAIN - FUNCTIONAL ASSESSMENT
PAIN_FUNCTIONAL_ASSESSMENT: ACTIVITIES ARE NOT PREVENTED
PAIN_FUNCTIONAL_ASSESSMENT: ACTIVITIES ARE NOT PREVENTED

## 2024-05-01 NOTE — PROGRESS NOTES
Surgery updated of patients Bright red blood from rectum - specimen sent to lab as previously ordered.

## 2024-05-01 NOTE — CARE COORDINATION
Transition of Care-Met with patient at the bedside, introduced myself and CM role in discharge planning. Patient is independent from home, lives with his wife Paz in a Ranch style home, does not use any DME. No history of HHC or SNF. PCP is Dr. Spain, preferred pharmacy is [facility]. Admitted for colitis, IV hydration, Rocephin and IV Flagyl. ELOS 24-48 hrs. Patient does not anticipate any discharge needs, wife to transport home.     Shelly SCANLONN, RN  [facility]

## 2024-05-01 NOTE — ACP (ADVANCE CARE PLANNING)
Advance Care Planning   Healthcare Decision Maker:    Primary Decision Maker: EchevarriaPaz - Pkxd[place] - [phone]    Click here to complete Healthcare Decision Makers including selection of the Healthcare Decision Maker Relationship (ie \"Primary\").

## 2024-05-01 NOTE — PROGRESS NOTES
GENERAL SURGERY  DAILY PROGRESS NOTE  5/1/2024    Subjective:  Tolerating diet, cramping pain improving.  A few bloody bowel movements overnight.   Stool studies not back    Objective:  BP (!) 160/88   Pulse 77   Temp 97.5 °F (36.4 °C) (Oral)   Resp 16   Ht 1.905 m (6' 3\")   Wt 113.4 kg (250 lb)   SpO2 98%   BMI 31.25 kg/m²     General Appearance:  awake, alert, oriented, in no acute distress  Skin:  Skin color, texture, turgor normal  Head/face:  NCAT  Eyes:  No gross abnormalities. Sclera nonicteric  Lungs/Chest:  Normal expansion. No respiratory distress. On RA  Heart: Warm throughout. Regular rate   Abdomen:  Soft, minimal tenderness, non distended.    Extremities: Extremities warm to touch, pink, with no edema.      I have personally reviewed all relevant labs and imaging.    Assessment/Plan:  59 y.o. male with concern for mild colitis      PLAN:  No acute surgical interventions improving with medical management   Diet as tolerated  Agree with rocephin/flagyl for abx  Agree with cdiff and stool cultures - negative thus far   Stop IVF   Monitor abdominal exam   Rest per primary   Might benefit from OP cscope in 6 weeks     Electronically signed by Hossein Fair DO on 5/1/2024 at 6:20 AM

## 2024-05-01 NOTE — PLAN OF CARE
Problem: ABCDS Injury Assessment  Goal: Absence of physical injury  5/1/2024 1032 by Elizabeth Hernández RN  Outcome: Progressing  4/30/2024 2251 by Deena Contreras RN  Outcome: Progressing     Problem: Discharge Planning  Goal: Discharge to home or other facility with appropriate resources  5/1/2024 1032 by Elizabeth Hernández RN  Outcome: Progressing  4/30/2024 2251 by Deena Contreras RN  Outcome: Progressing     Problem: Chronic Conditions and Co-morbidities  Goal: Patient's chronic conditions and co-morbidity symptoms are monitored and maintained or improved  4/30/2024 2251 by Deena Contreras RN  Outcome: Progressing     Problem: Pain  Goal: Verbalizes/displays adequate comfort level or baseline comfort level  4/30/2024 2251 by Deena Contreras RN  Outcome: Progressing     Problem: Safety - Adult  Goal: Free from fall injury  4/30/2024 2251 by Deena Contreras RN  Outcome: Progressing

## 2024-05-01 NOTE — PLAN OF CARE
Problem: Discharge Planning  Goal: Discharge to home or other facility with appropriate resources  Outcome: Progressing     Problem: Pain  Goal: Verbalizes/displays adequate comfort level or baseline comfort level  Outcome: Progressing       Problem: Chronic Conditions and Co-morbidities  Goal: Patient's chronic conditions and co-morbidity symptoms are monitored and maintained or improved  Outcome: Progressing     Problem: Safety - Adult  Goal: Free from fall injury  Outcome: Progressing

## 2024-05-01 NOTE — CONSULTS
[facility]  [facility]  Consultation Note     Admit Date: 4/30/2024  7:35 AM    Reason for Consult:   septic    Attending Physician:  Fabian Luis DO    HISTORY OF PRESENT ILLNESS:             The history is obtained from extensive review of available past medical records. The patient is a 59 y.o. male who is previously known to the ID service. Of note he was treated with 10 days of Cephalexin 2 weeks prior to the presentation from urology at [facility] for a suspected UTI    Presented to the ED at [facility] on 4/30 with syncope, hypotension, and emesis. He was having some abdominal cramping and attempted to have a bowel movement and passed out on the toilet. When EMS arrived his BP was low and he was bradycardic, they gave him a push of epi. He felt fine Monday evening. He denies any fevers, chills, diarrhea, night sweats. He has some RLQ abdominal tenderness on palpation. He had 3 loose BM yesterday afternoon but nothing today. He reports feeling slightly better.     Labs in the ED showed  WBC 8.3, lactic acid of 3.4 down to 1.7. WBC elevated today likely due to steroids given in the ED. CT of the abdomen shows mild, diffuse thickening of the walls of the descending colon with mild pericolonic linear stranding & colonic diverticulosis. He was given a dose of Zosyn in the ED and started on Ceftriaxone and Flagyl by surgery. ID was asked to see today.     Past Medical History:    November 21, 2022: The patient came back to [facility] ED  with fever, cough, malaise, uncontrollable shaking chills. A urine culture was never ordered. He was treated with Cefepime. The urine specimen was from 11/16/2022 was actually processed and showed <25,000 ESBL + E. coli and <10,000 CFU streptococcal species. He tested positive for influenza A. Blood cultures turned positive for ESBL + E. Coli. Started on meropenem and Oseltamivir. He was discharged on 10 days of Ertapenem with a PICC line.  show round, and reactive pupils. No jaundice. Moist mucous membranes, no ulcerations, no thrush.   Neck: Supple to movements. No lymphadenopathy.   Chest: No use of accessory muscles to breathe. Symmetrical expansion. Auscultation reveals no wheezing, crackles, or rhonchi.   Cardiovascular: S1 and S2 are rhythmic and regular. No murmurs appreciated.   Abdomen: Positive bowel sounds to auscultation. Tenderness to palpation RLQ, some tenderness mid epigastric. No masses felt. No hepatosplenomegaly.  Extremities: No clubbing, no cyanosis, no edema.  Lines: Peripheral.    Lab Results   Component Value Date    CRP 3.8 (H) 11/28/2022    CRP 6.1 (H) 11/25/2022    CRP 13.4 (H) 11/19/2022      No results found for: \"CRPHS\"  Lab Results   Component Value Date    SEDRATE 70 (H) 11/28/2022    SEDRATE 62 (H) 11/25/2022    SEDRATE 66 (H) 11/19/2022       Radiology:  Noted    Microbiology:  Blood cultures: negative so far   Urine culture pending     Recent Labs     04/30/24  0756   PROCAL 0.20*       Assessment:  Colitis, possibly associated to recent antibiotic use, rule out C.difficile  Leukocytosis associated to steroid administration  Syncopal episode after attempting to use bathroom likely causing hypotension  Recent UTI, treated with Cephalexin for 10 days as an outpatient, no cultures were done      Plan:    Consolidate antibiotics to Unasyn   Check cultures, baseline ESR, CRP  Monitor labs  Will follow with you    Thank you for having us see this patient in consultation. I will be discussing this case with the treating physicians.    ADALBERTO Shaw CNP  11:36 AM  5/1/2024    Patient seen and examined. I had a face to face encounter with the patient. Agree with exam.  Assessment and plan as outlined above and directed by me. Addition and corrections were done as deemed appropriate. My exam and plan include: The patient is known to the ID service.  She was recently treated in [place] for suspected UTI but apparently

## 2024-05-01 NOTE — PROGRESS NOTES
[facility] Hospitalist Progress Note    Admitting Date and Time: 4/30/2024  7:35 AM  Admit Dx: Syncope and collapse [R55]  Colitis [K52.9]    Subjective:  Patient is being followed for Syncope and collapse [R55]  Colitis [K52.9]   Pt was seen and examined.     ROS: denies fever, chills, cp, sob, n/v, HA unless stated above.      ampicillin-sulbactam  3,000 mg IntraVENous Q6H    DULoxetine  120 mg Oral Nightly    [Held by provider] lisinopril  40 mg Oral Daily    QUEtiapine  200 mg Oral Nightly    [Held by provider] triamterene-hydroCHLOROthiazide  1 tablet Oral Daily    sodium chloride flush  5-40 mL IntraVENous 2 times per day    enoxaparin  30 mg SubCUTAneous BID    insulin lispro  0-8 Units SubCUTAneous TID WC    insulin lispro  0-4 Units SubCUTAneous Nightly     amphetamine-dextroamphetamine, 20 mg, TID PRN  clonazePAM, 0.5 mg, BID PRN  famotidine, 40 mg, Daily PRN  oxyBUTYnin, 10 mg, Daily PRN  pantoprazole, 40 mg, Daily PRN  sodium chloride flush, 5-40 mL, PRN  sodium chloride, , PRN  potassium chloride, 40 mEq, PRN   Or  potassium alternative oral replacement, 40 mEq, PRN   Or  potassium chloride, 10 mEq, PRN  magnesium sulfate, 2,000 mg, PRN  ondansetron, 4 mg, Q8H PRN   Or  ondansetron, 4 mg, Q6H PRN  polyethylene glycol, 17 g, Daily PRN  acetaminophen, 650 mg, Q6H PRN   Or  acetaminophen, 650 mg, Q6H PRN  dextrose bolus, 125 mL, PRN   Or  dextrose bolus, 250 mL, PRN  glucagon (rDNA), 1 mg, PRN  dextrose, , Continuous PRN  Glucose, 15 g, PRN  oxyCODONE-acetaminophen, 1 tablet, Q6H PRN  prochlorperazine, 10 mg, Q6H PRN         Objective:    BP (!) 143/96   Pulse 90   Temp 98.3 °F (36.8 °C) (Oral)   Resp 16   Ht 1.905 m (6' 3\")   Wt 113.4 kg (250 lb)   SpO2 98%   BMI 31.25 kg/m²     General Appearance: alert and oriented to person, place and time and in no acute distress  Skin: warm and dry  Head: normocephalic and atraumatic  Eyes: pupils equal, round, and reactive to light, extraocular eye        NOTE: This report was transcribed using voice recognition software. Every effort was made to ensure accuracy; however, inadvertent computerized transcription errors may be present.  Electronically signed by Fabian Luis DO on 5/1/2024 at 3:41 PM

## 2024-05-01 NOTE — PROGRESS NOTES
General Surgery   Daily Progress Note      Patient's Name/Date of Birth: Aleksey Echevarria / 1965    Date: May 1, 2024     Chief Complaint: abd pain     Patient Active Problem List   Diagnosis    Depression    Anxiety    Obesity    Metabolic syndrome    Fibromyalgia    Hyperchylomicronemia    Essential hypertension    Prostate enlargement    Fatigue    Disorder of bone    Bowel habit changes    Prediabetes    Migraine without aura and without status migrainosus, not intractable    Gastroesophageal reflux disease without esophagitis    Bilateral pulmonary embolism (HCC)    Septic shock (HCC)    Hypomagnesemia    Ureteral stent present    Hypokalemia    Sepsis (HCC)    Complicated UTI (urinary tract infection)    Colitis    Syncope and collapse       Subjective: still cramping pain, worse with liquids. No nausea or emesis. No flatus or Bms.     No Ios recorded   Stool studies negative     Objective:  BP (!) 160/88   Pulse 77   Temp 97.5 °F (36.4 °C) (Oral)   Resp 16   Ht 1.905 m (6' 3\")   Wt 113.4 kg (250 lb)   SpO2 98%   BMI 31.25 kg/m²   Labs:  Recent Labs     04/30/24  0756 05/01/24  0404   WBC 8.3 16.4*   HGB 14.2 13.6   HCT 43.3 41.3     Lab Results   Component Value Date    CREATININE 1.7 (H) 04/30/2024    BUN 38 (H) 04/30/2024     04/30/2024    K 3.2 (L) 04/30/2024     04/30/2024    CO2 24 04/30/2024     No results for input(s): \"LIPASE\", \"AMYLASE\" in the last 72 hours.    General appearance:  NAD  Head: NCAT, PERRLA, EOMI, red conjunctiva  Neck: supple, no masses  Lungs: symmetric chest rise, no audible wheezes  Heart: normal rate per peripheral pulse  Abdomen: soft, nondistended, mild diffuse tenderness  Skin: no visible lesions  Gu: no cva tenderness  Extremities: extremities normal, atraumatic, no cyanosis or edema      Assessment/Plan:  59 y.o. male with concern for mild colitis     PLAN:  No acute surgical inteventions  Agree with rocephin/flagyl for abx  Agree with cdiff and stool  cultures - negative thus far   Ok for FLD  IVF @ 50 cc   Monitor abdominal exam   Rest per primary   Might benefit from OP cscope in 6 weeks   Will discuss with Dr. Jack Bell MD  PGY-3 General Surgery Resident    The patient was seen and examined and the chart was reviewed.  I agree with the assessment and plan.  The patient's diet will be increased as tolerated.  Ultimately, the patient will undergo a colonoscopy as an outpatient.  Moderate complexity  I performed an updated history, physical examination, assessment and plan.  Antonio Santiago MD

## 2024-05-02 VITALS
OXYGEN SATURATION: 94 % | RESPIRATION RATE: 16 BRPM | DIASTOLIC BLOOD PRESSURE: 81 MMHG | SYSTOLIC BLOOD PRESSURE: 129 MMHG | TEMPERATURE: 99.1 F | HEIGHT: 75 IN | HEART RATE: 92 BPM | BODY MASS INDEX: 30.4 KG/M2 | WEIGHT: 244.5 LBS

## 2024-05-02 LAB
ALBUMIN SERPL-MCNC: 3.2 G/DL (ref 3.5–5.2)
ALP SERPL-CCNC: 89 U/L (ref 40–129)
ALT SERPL-CCNC: 11 U/L (ref 0–40)
ANION GAP SERPL CALCULATED.3IONS-SCNC: 10 MMOL/L (ref 7–16)
AST SERPL-CCNC: 14 U/L (ref 0–39)
BASOPHILS # BLD: 0 K/UL (ref 0–0.2)
BASOPHILS NFR BLD: 0 % (ref 0–2)
BILIRUB SERPL-MCNC: 0.5 MG/DL (ref 0–1.2)
BUN SERPL-MCNC: 13 MG/DL (ref 6–23)
C DIFF GDH + TOXINS A+B STL QL IA.RAPID: NEGATIVE
CALCIUM SERPL-MCNC: 8.4 MG/DL (ref 8.6–10.2)
CHLORIDE SERPL-SCNC: 104 MMOL/L (ref 98–107)
CO2 SERPL-SCNC: 23 MMOL/L (ref 22–29)
CREAT SERPL-MCNC: 1 MG/DL (ref 0.7–1.2)
EOSINOPHIL # BLD: 0.31 K/UL (ref 0.05–0.5)
EOSINOPHILS RELATIVE PERCENT: 2 % (ref 0–6)
ERYTHROCYTE [DISTWIDTH] IN BLOOD BY AUTOMATED COUNT: 13.3 % (ref 11.5–15)
GFR, ESTIMATED: 87 ML/MIN/1.73M2
GLUCOSE BLD-MCNC: 117 MG/DL (ref 74–99)
GLUCOSE BLD-MCNC: 92 MG/DL (ref 74–99)
GLUCOSE SERPL-MCNC: 138 MG/DL (ref 74–99)
HCT VFR BLD AUTO: 38 % (ref 37–54)
HGB BLD-MCNC: 12.6 G/DL (ref 12.5–16.5)
LYMPHOCYTES NFR BLD: 0.31 K/UL (ref 1.5–4)
LYMPHOCYTES RELATIVE PERCENT: 2 % (ref 20–42)
MAGNESIUM SERPL-MCNC: 1.6 MG/DL (ref 1.6–2.6)
MCH RBC QN AUTO: 31.1 PG (ref 26–35)
MCHC RBC AUTO-ENTMCNC: 33.2 G/DL (ref 32–34.5)
MCV RBC AUTO: 93.8 FL (ref 80–99.9)
MICROORGANISM SPEC CULT: ABNORMAL
MONOCYTES NFR BLD: 1.1 K/UL (ref 0.1–0.95)
MONOCYTES NFR BLD: 6 % (ref 2–12)
MYELOCYTES ABSOLUTE COUNT: 0.16 K/UL
MYELOCYTES: 1 %
NEUTROPHILS NFR BLD: 90 % (ref 43–80)
NEUTS SEG NFR BLD: 16.12 K/UL (ref 1.8–7.3)
PLATELET # BLD AUTO: 158 K/UL (ref 130–450)
PMV BLD AUTO: 8.9 FL (ref 7–12)
POTASSIUM SERPL-SCNC: 3.2 MMOL/L (ref 3.5–5)
PROT SERPL-MCNC: 5.8 G/DL (ref 6.4–8.3)
RBC # BLD AUTO: 4.05 M/UL (ref 3.8–5.8)
RBC # BLD: NORMAL 10*6/UL
SODIUM SERPL-SCNC: 137 MMOL/L (ref 132–146)
SPECIMEN DESCRIPTION: ABNORMAL
SPECIMEN DESCRIPTION: NORMAL
WBC OTHER # BLD: 18 K/UL (ref 4.5–11.5)

## 2024-05-02 PROCEDURE — 6360000002 HC RX W HCPCS: Performed by: REGISTERED NURSE

## 2024-05-02 PROCEDURE — 80053 COMPREHEN METABOLIC PANEL: CPT

## 2024-05-02 PROCEDURE — 36415 COLL VENOUS BLD VENIPUNCTURE: CPT

## 2024-05-02 PROCEDURE — 6360000002 HC RX W HCPCS: Performed by: INTERNAL MEDICINE

## 2024-05-02 PROCEDURE — 85025 COMPLETE CBC W/AUTO DIFF WBC: CPT

## 2024-05-02 PROCEDURE — 2580000003 HC RX 258: Performed by: REGISTERED NURSE

## 2024-05-02 PROCEDURE — 99239 HOSP IP/OBS DSCHRG MGMT >30: CPT | Performed by: INTERNAL MEDICINE

## 2024-05-02 PROCEDURE — 6370000000 HC RX 637 (ALT 250 FOR IP): Performed by: INTERNAL MEDICINE

## 2024-05-02 PROCEDURE — 2580000003 HC RX 258: Performed by: INTERNAL MEDICINE

## 2024-05-02 PROCEDURE — 82962 GLUCOSE BLOOD TEST: CPT

## 2024-05-02 PROCEDURE — 83735 ASSAY OF MAGNESIUM: CPT

## 2024-05-02 PROCEDURE — 2580000003 HC RX 258

## 2024-05-02 RX ORDER — POTASSIUM CHLORIDE 20 MEQ/1
40 TABLET, EXTENDED RELEASE ORAL ONCE
Status: COMPLETED | OUTPATIENT
Start: 2024-05-02 | End: 2024-05-02

## 2024-05-02 RX ADMIN — POTASSIUM CHLORIDE 40 MEQ: 1500 TABLET, EXTENDED RELEASE ORAL at 11:11

## 2024-05-02 RX ADMIN — AMPICILLIN SODIUM AND SULBACTAM SODIUM 3000 MG: 2; 1 INJECTION, POWDER, FOR SOLUTION INTRAMUSCULAR; INTRAVENOUS at 06:48

## 2024-05-02 RX ADMIN — OXYBUTYNIN CHLORIDE 10 MG: 10 TABLET, EXTENDED RELEASE ORAL at 08:02

## 2024-05-02 RX ADMIN — SODIUM CHLORIDE, PRESERVATIVE FREE 10 ML: 5 INJECTION INTRAVENOUS at 07:32

## 2024-05-02 RX ADMIN — SODIUM CHLORIDE: 9 INJECTION, SOLUTION INTRAVENOUS at 00:37

## 2024-05-02 RX ADMIN — OXYCODONE HYDROCHLORIDE AND ACETAMINOPHEN 1 TABLET: 5; 325 TABLET ORAL at 08:02

## 2024-05-02 RX ADMIN — POTASSIUM CHLORIDE 40 MEQ: 1500 TABLET, EXTENDED RELEASE ORAL at 13:43

## 2024-05-02 RX ADMIN — ENOXAPARIN SODIUM 30 MG: 100 INJECTION SUBCUTANEOUS at 07:32

## 2024-05-02 RX ADMIN — AMPICILLIN SODIUM AND SULBACTAM SODIUM 3000 MG: 2; 1 INJECTION, POWDER, FOR SOLUTION INTRAMUSCULAR; INTRAVENOUS at 11:12

## 2024-05-02 RX ADMIN — AMPICILLIN SODIUM AND SULBACTAM SODIUM 3000 MG: 2; 1 INJECTION, POWDER, FOR SOLUTION INTRAMUSCULAR; INTRAVENOUS at 00:42

## 2024-05-02 RX ADMIN — ONDANSETRON 4 MG: 2 INJECTION INTRAMUSCULAR; INTRAVENOUS at 07:32

## 2024-05-02 NOTE — CARE COORDINATION
Transition of Care-Patient remains on IV Unasyn, Gen Surg following for colitis. He is on a regular diet, appears to be tolerating. Patient is independent, wife to transport home, discharge likely within the next 24-48 hrs. No needs anticipated.    Shelly SCANLONN, RN  [facility]

## 2024-05-02 NOTE — PLAN OF CARE
Problem: ABCDS Injury Assessment  Goal: Absence of physical injury  Outcome: Progressing     Problem: Discharge Planning  Goal: Discharge to home or other facility with appropriate resources  Outcome: Progressing     Problem: Chronic Conditions and Co-morbidities  Goal: Patient's chronic conditions and co-morbidity symptoms are monitored and maintained or improved  Outcome: Progressing     Problem: Pain  Goal: Verbalizes/displays adequate comfort level or baseline comfort level  Outcome: Progressing     Problem: Safety - Adult  Goal: Free from fall injury  Outcome: Progressing

## 2024-05-02 NOTE — PLAN OF CARE
Problem: ABCDS Injury Assessment  Goal: Absence of physical injury  5/2/2024 0835 by Marti Pearce RN  Outcome: Progressing  5/2/2024 0202 by Deena Contreras RN  Outcome: Progressing     Problem: Discharge Planning  Goal: Discharge to home or other facility with appropriate resources  5/2/2024 0835 by Marti Pearce RN  Outcome: Progressing  5/2/2024 0202 by Deena Contreras RN  Outcome: Progressing

## 2024-05-02 NOTE — DISCHARGE SUMMARY
[facility] Hospitalist Physician Discharge Summary       Antonio Santiago MD  [address] [facility] [address]  [place] [address]  [phone]    Follow up        Activity level: As tolerated     Dispo: Home      Condition on discharge: Stable     Patient ID:  Aleksey Echevarria  [id number]  59 y.o.  1965    Admit date: 4/30/2024    Discharge date and time:  5/2/2024  3:59 PM    Admission Diagnoses: Principal Problem:    Colitis  Active Problems:    Syncope and collapse  Resolved Problems:    * No resolved hospital problems. *      Discharge Diagnoses: Principal Problem:    Colitis  Active Problems:    Syncope and collapse  Resolved Problems:    * No resolved hospital problems. *      Consults:  IP CONSULT TO GENERAL SURGERY  IP CONSULT TO INFECTIOUS DISEASES    Procedures: None    Hospital Course:   Patient Aleksey Echevarria is a 59 y.o. presented with Syncope and collapse [R55]  Colitis [K52.9]    59-year-old male presents to the hospital with sepsis secondary to urinary tract infection as well as colitis. C. difficile studies are negative.  Urine culture showed gram-positive organisms.  Patient is medically stable for discharge    Discharge Exam:    General Appearance: alert and oriented to person, place and time and in no acute distress  Skin: warm and dry  Head: normocephalic and atraumatic  Eyes: pupils equal, round, extraocular eye movements intact, conjunctivae normal  Pulmonary/Chest: clear to auscultation bilaterally- no wheezes, rales or rhonchi, normal air movement, no respiratory distress  Cardiovascular: normal rate, normal S1 and S2   Abdomen: soft, non-tender, non-distended, normal bowel sounds,   Extremities: no cyanosis, no clubbing and no edema  Neurologic: no cranial nerve deficit and speech normal      I/O last 3 completed shifts:  In: 300 [P.O.:300]  Out: -   I/O this shift:  In: 480 [P.O.:480]  Out: -       LABS:  Recent Labs     04/30/24  0756 05/01/24  0404 05/02/24  0755    140 137

## 2024-05-02 NOTE — PROGRESS NOTES
[facility]  [facility]  Progress Note    SUBJECTIVE:  Chief Complaint   Patient presents with    Loss of Consciousness     Multiple brief episodes of LOC for EMS, given 10 mcg push dose epi by EMS (for hptn and bradycardia) patient states he took viagra last night but has been on it for awhile    hypotension    Emesis     Multiple episodes last night, Given 4 mg zofran by ems     Patient is tolerating medications. No reported adverse drug reactions.  Patient resting in bed, visitor at bedside  Patient is reporting abdominal cramping and some tenderness  States he had 2 bowel movements so far today, reports they were somewhat formed  No fevers overnight    Review of systems:  As stated above in the chief complaint, otherwise negative.    Medications:  Scheduled Meds:   ampicillin-sulbactam  3,000 mg IntraVENous Q6H    DULoxetine  120 mg Oral Nightly    [Held by provider] lisinopril  40 mg Oral Daily    QUEtiapine  200 mg Oral Nightly    [Held by provider] triamterene-hydroCHLOROthiazide  1 tablet Oral Daily    sodium chloride flush  5-40 mL IntraVENous 2 times per day    enoxaparin  30 mg SubCUTAneous BID    insulin lispro  0-8 Units SubCUTAneous TID WC    insulin lispro  0-4 Units SubCUTAneous Nightly     Continuous Infusions:   sodium chloride      dextrose       PRN Meds:amphetamine-dextroamphetamine, clonazePAM, famotidine, oxyBUTYnin, pantoprazole, sodium chloride flush, sodium chloride, potassium chloride **OR** potassium alternative oral replacement **OR** potassium chloride, magnesium sulfate, ondansetron **OR** ondansetron, polyethylene glycol, acetaminophen **OR** acetaminophen, dextrose bolus **OR** dextrose bolus, glucagon (rDNA), dextrose, Glucose, oxyCODONE-acetaminophen, prochlorperazine    OBJECTIVE:  /81   Pulse 92   Temp 99.1 °F (37.3 °C) (Oral)   Resp 16   Ht 1.905 m (6' 3\")   Wt 110.9 kg (244 lb 8 oz)   SpO2 94%   BMI 30.56 kg/m²   Temp  Av °F  were done as deemed appropriate. My exam and plan include: The patient is feeling better.  He has less pain.  Stools are forming.  Appetite is improving.  Tolerating antibiotic.  Will go ahead and stop Unasyn altogether and observe off antibiotics.    Brent Nam MD  5/2/2024  12:10 PM

## 2024-05-02 NOTE — PROGRESS NOTES
GENERAL SURGERY  DAILY PROGRESS NOTE  5/2/2024    Subjective:  No issues overnight, pain is improving. No nausea or emesis tolerating diet    Stool studies not back    Objective:  /81   Pulse 92   Temp 99.1 °F (37.3 °C) (Oral)   Resp 16   Ht 1.905 m (6' 3\")   Wt 110.9 kg (244 lb 8 oz)   SpO2 94%   BMI 30.56 kg/m²     General Appearance:  awake, alert, oriented, in no acute distress  Skin:  Skin color, texture, turgor normal  Head/face:  NCAT  Eyes:  No gross abnormalities. Sclera nonicteric  Lungs/Chest:  Normal expansion. No respiratory distress. On RA  Heart: Warm throughout. Regular rate   Abdomen:  Soft, minimal tenderness, non distended.    Extremities: Extremities warm to touch, pink, with no edema.      I have personally reviewed all relevant labs and imaging.    Assessment/Plan:  59 y.o. male with concern for mild colitis      PLAN:  No acute surgical interventions improving with medical management ok for DC, outpatient colonoscopy   Diet as tolerated  Agree with rocephin/flagyl for abx  Agree with cdiff and stool cultures - negative thus far   Stop IVF   Monitor abdominal exam   Rest per primary   OP cscope in 6-8 weeks     Electronically signed by Maryann Bell MD on 5/2/2024 at 8:09 AM

## 2024-05-03 LAB
MICROORGANISM SPEC CULT: NORMAL
MICROORGANISM SPEC CULT: NORMAL
SPECIMEN DESCRIPTION: NORMAL

## 2024-05-05 LAB
MICROORGANISM SPEC CULT: NORMAL
MICROORGANISM SPEC CULT: NORMAL
SERVICE CMNT-IMP: NORMAL
SERVICE CMNT-IMP: NORMAL
SPECIMEN DESCRIPTION: NORMAL
SPECIMEN DESCRIPTION: NORMAL

## (undated) DEVICE — GLOVE SURG SZ 7 CRM LTX FREE POLYISOPRENE POLYMER BEAD ANTI

## (undated) DEVICE — 25 MM 1.2 MICRON SYRINGE FILTER: Brand: SUPOR

## (undated) DEVICE — SOLUTION IV IRRIG POUR BRL 0.9% SODIUM CHL 2F7124

## (undated) DEVICE — PACK PROCEDURE SURG RETINAL ST ES CUST

## (undated) DEVICE — 3M™ TEGADERM™ TRANSPARENT FILM DRESSING FRAME STYLE, 1626W, 4 IN X 4-3/4 IN (10 CM X 12 CM), 50/CT 4CT/CASE: Brand: 3M™ TEGADERM™

## (undated) DEVICE — SHIELD EYE W3XL2.5IN UNIV CLR PLAS LTWT

## (undated) DEVICE — SYRINGE, LUER LOCK, 5ML: Brand: MEDLINE

## (undated) DEVICE — SURGICAL PROCEDURE PACK 25 GA VLV CPM 10K

## (undated) DEVICE — KIT,ANTI FOG,W/SPONGE & FLUID,SOFT PACK: Brand: MEDLINE

## (undated) DEVICE — LENS MICSCP ENH W FLD BIOM

## (undated) DEVICE — DRAPE MICSCP FULL FLD STRL OCULUS ZEISS

## (undated) DEVICE — GLOVE SURG SZ 8 CRM LTX FREE POLYISOPRENE POLYMER BEAD ANTI

## (undated) DEVICE — CAUTERY BPLR 25GA INTOCU W/ HNDPC CRD DISP FOR CX9404

## (undated) DEVICE — GOWN,SIRUS,FABRNF,XL,20/CS: Brand: MEDLINE

## (undated) DEVICE — SET SCLERAL BUCKLE INSTR

## (undated) DEVICE — SYRINGE TB 1ML NDL 25GA L0.625IN PLAS SLIP TIP CONVENTIONAL

## (undated) DEVICE — BRACELET ID ALERT FOR PT INFO ISPAN

## (undated) DEVICE — SYRINGE, LUER LOCK, 10ML: Brand: MEDLINE

## (undated) DEVICE — NEEDLE FLTR 18GA L1.5IN MEM THK5UM BLNT DISP

## (undated) DEVICE — SOLUTION IV IRRIG WATER 1000ML POUR BRL 2F7114

## (undated) DEVICE — SET RETINA

## (undated) DEVICE — BIOM OPTIC SET DISPOSABLE, WITH BIOM HD FLEX LENS FOR F=175 MM OR F=200 MM: Brand: BIOM OPTIC SET DISPOSABLE, WITH BIOM HD FLEX LENS FOR F=175 MM OR F=200 MM

## (undated) DEVICE — PRONE PILLOW: Brand: DEROYAL

## (undated) DEVICE — Device: Brand: 25-27G BRITELIGHT™ ANGLED 20° ENDOPROBE® BOX OF 6

## (undated) DEVICE — CANNULA INJ 25GA SFT TIP DISP

## (undated) DEVICE — Z DUP USE 2522782 SOLUTION IRRIG 1000ML STRL H2O PLAS CONTAINER UROMATIC